# Patient Record
Sex: FEMALE | Race: WHITE | NOT HISPANIC OR LATINO | URBAN - METROPOLITAN AREA
[De-identification: names, ages, dates, MRNs, and addresses within clinical notes are randomized per-mention and may not be internally consistent; named-entity substitution may affect disease eponyms.]

---

## 2017-03-03 ENCOUNTER — HOSPITAL ENCOUNTER (OUTPATIENT)
Dept: URGENT CARE | Facility: CLINIC | Age: 52
Setting detail: SPECIMEN
Discharge: HOME | End: 2017-03-03
Attending: FAMILY MEDICINE | Admitting: FAMILY MEDICINE

## 2017-03-03 ENCOUNTER — HOSPITAL ENCOUNTER (OUTPATIENT)
Dept: LAB | Facility: HOSPITAL | Age: 52
Discharge: HOME | End: 2017-03-03
Attending: FAMILY MEDICINE | Admitting: FAMILY MEDICINE

## 2017-03-03 LAB
ANION GAP SERPL CALC-SCNC: 17 MMOL/L (ref 10–20)
APTT BLD: 26.3 SEC (ref 24–31)
BACTERIA SPEC AEROBE CULT: NORMAL
BASOPHILS # BLD AUTO: 0 10*3/UL (ref 0–0.2)
BASOPHILS NFR BLD AUTO: 0 % (ref 0–2)
BILIRUB UR QL STRIP: NEGATIVE MG/DL
BUN SERPL-MCNC: 9 MG/DL (ref 8–20)
BUN/CREAT SERPL: 12.9 (ref 5.4–26.2)
C TRACH RRNA SPEC QL PROBE: NORMAL
CALCIUM SERPL-MCNC: 8.8 MG/DL (ref 8.9–10.3)
CASTS URNS QL MICRO: ABNORMAL /[LPF]
CHLORIDE SERPL-SCNC: 104 MMOL/L (ref 101–111)
COLOR UR: ABNORMAL
CONV BACTERIA IN URINE MICRO: NEGATIVE
CONV CLARITY OF URINE: ABNORMAL
CONV CO2: 24 MMOL/L (ref 22–32)
CONV HYALINE CASTS IN URINE MICRO: 0 /[LPF] (ref 0–5)
CONV PROTEIN IN URINE BY AUTOMATED TEST STRIP: 30 MG/DL
CONV SMALL ROUND CELLS: ABNORMAL /[HPF]
CONV UROBILINOGEN IN URINE BY AUTOMATED TEST STRIP: 0.2 MG/DL
CREAT UR-MCNC: 0.7 MG/DL (ref 0.4–1)
CULTURE INDICATED?: ABNORMAL
DIFFERENTIAL METHOD BLD: (no result)
EOSINOPHIL # BLD AUTO: 0.1 10*3/UL (ref 0–0.3)
EOSINOPHIL # BLD AUTO: 1 % (ref 0–3)
ERYTHROCYTE [DISTWIDTH] IN BLOOD BY AUTOMATED COUNT: 20.5 % (ref 11.5–14.5)
GLUCOSE SERPL-MCNC: 89 MG/DL (ref 65–99)
GLUCOSE UR QL: ABNORMAL
GLUCOSE UR QL: ABNORMAL MG/DL
HCT VFR BLD AUTO: 29.6 % (ref 35–49)
HGB BLD-MCNC: 9.5 G/DL (ref 12–15)
HGB UR QL STRIP: ABNORMAL
INR PPP: 0.7
KETONES UR QL STRIP: NEGATIVE MG/DL
LEUKOCYTE ESTERASE UR QL STRIP: ABNORMAL
LYMPHOCYTES # BLD AUTO: 2.9 10*3/UL (ref 0.8–4.8)
LYMPHOCYTES NFR BLD AUTO: 29 % (ref 18–42)
Lab: NORMAL
MCH RBC QN AUTO: 22.4 PG (ref 26–32)
MCHC RBC AUTO-ENTMCNC: 32.1 G/DL (ref 32–36)
MCV RBC AUTO: 69.7 FL (ref 80–94)
MICRO REPORT STATUS: NORMAL
MONOCYTES # BLD AUTO: 0.6 10*3/UL (ref 0.1–1.3)
MONOCYTES NFR BLD AUTO: 6 % (ref 2–11)
N GONORRHOEA RRNA SPEC QL PROBE: NORMAL
NEUTROPHILS # BLD AUTO: 6.6 10*3/UL (ref 2.3–8.6)
NEUTROPHILS NFR BLD AUTO: 64 % (ref 50–75)
NITRITE UR QL STRIP: NEGATIVE
NRBC BLD AUTO-RTO: 0 /100{WBCS}
NRBC/RBC NFR BLD MANUAL: 0 10*3/UL
PH UR STRIP.AUTO: 5.5 [PH] (ref 4.5–8)
PLATELET # BLD AUTO: 289 10*3/UL (ref 150–450)
PMV BLD AUTO: 8.9 FL (ref 7.4–10.4)
POTASSIUM SERPL-SCNC: 4 MMOL/L (ref 3.6–5.1)
PROTHROMBIN TIME: 10.3 SEC (ref 9.6–11.7)
RBC # BLD AUTO: 4.25 10*6/UL (ref 4–5.4)
RBC #/AREA URNS HPF: ABNORMAL /[HPF] (ref 0–3)
SODIUM SERPL-SCNC: 141 MMOL/L (ref 136–144)
SP GR UR: 1.01 (ref 1–1.03)
SPECIMEN SOURCE: NORMAL
SPECIMEN SOURCE: NORMAL
SPERM URNS QL MICRO: ABNORMAL /[HPF]
SQUAMOUS SPT QL MICRO: 0 /[HPF] (ref 0–5)
UNIDENT CRYS URNS QL MICRO: ABNORMAL /[HPF]
WBC # BLD AUTO: 10.3 10*3/UL (ref 4.5–11.5)
WBC #/AREA URNS HPF: 3 /[HPF] (ref 0–5)
YEAST SPEC QL WET PREP: ABNORMAL /[HPF]

## 2017-03-16 ENCOUNTER — HOSPITAL ENCOUNTER (OUTPATIENT)
Dept: OTHER | Facility: HOSPITAL | Age: 52
Setting detail: SPECIMEN
Discharge: HOME | End: 2017-03-16
Attending: FAMILY MEDICINE | Admitting: FAMILY MEDICINE

## 2017-06-15 ENCOUNTER — HOSPITAL ENCOUNTER (OUTPATIENT)
Dept: OTHER | Facility: HOSPITAL | Age: 52
Setting detail: SPECIMEN
Discharge: HOME | End: 2017-06-15
Attending: FAMILY MEDICINE | Admitting: FAMILY MEDICINE

## 2017-06-15 LAB
CHOLEST SERPL-MCNC: 269 MG/DL
CHOLEST/HDLC SERPL: 4.9 {RATIO}
CONV LDL CHOLESTEROL DIRECT: 186 MG/DL (ref 0–100)
HDLC SERPL-MCNC: 55 MG/DL
LDLC/HDLC SERPL: 3.4 {RATIO}
LIPID INTERPRETATION: ABNORMAL
TRIGL SERPL-MCNC: 238 MG/DL
VLDLC SERPL CALC-MCNC: 28.6 MG/DL

## 2017-06-16 ENCOUNTER — HOSPITAL ENCOUNTER (OUTPATIENT)
Dept: OTHER | Facility: HOSPITAL | Age: 52
Setting detail: SPECIMEN
Discharge: HOME | End: 2017-06-16
Attending: FAMILY MEDICINE | Admitting: FAMILY MEDICINE

## 2017-06-16 LAB
ALBUMIN SERPL-MCNC: 3.8 G/DL (ref 3.5–4.8)
ALBUMIN/GLOB SERPL: 1.1 {RATIO} (ref 1–1.7)
ALP SERPL-CCNC: 80 IU/L (ref 32–91)
ALT SERPL-CCNC: 17 IU/L (ref 14–54)
ANION GAP SERPL CALC-SCNC: 15.8 MMOL/L (ref 10–20)
AST SERPL-CCNC: 16 IU/L (ref 15–41)
BASOPHILS # BLD AUTO: 0.1 10*3/UL (ref 0–0.2)
BASOPHILS NFR BLD AUTO: 1 % (ref 0–2)
BILIRUB SERPL-MCNC: 0.3 MG/DL (ref 0.3–1.2)
BUN SERPL-MCNC: 12 MG/DL (ref 8–20)
BUN/CREAT SERPL: 20 (ref 5.4–26.2)
CALCIUM SERPL-MCNC: 9.4 MG/DL (ref 8.9–10.3)
CHLORIDE SERPL-SCNC: 103 MMOL/L (ref 101–111)
CONV CO2: 25 MMOL/L (ref 22–32)
CONV TOTAL PROTEIN: 7.3 G/DL (ref 6.1–7.9)
CREAT UR-MCNC: 0.6 MG/DL (ref 0.4–1)
DIFFERENTIAL METHOD BLD: (no result)
EOSINOPHIL # BLD AUTO: 0.2 10*3/UL (ref 0–0.3)
EOSINOPHIL # BLD AUTO: 3 % (ref 0–3)
ERYTHROCYTE [DISTWIDTH] IN BLOOD BY AUTOMATED COUNT: 20 % (ref 11.5–14.5)
GLOBULIN UR ELPH-MCNC: 3.5 G/DL (ref 2.5–3.8)
GLUCOSE SERPL-MCNC: 82 MG/DL (ref 65–99)
HCT VFR BLD AUTO: 37.7 % (ref 35–49)
HGB BLD-MCNC: 11.5 G/DL (ref 12–15)
LYMPHOCYTES # BLD AUTO: 3.3 10*3/UL (ref 0.8–4.8)
LYMPHOCYTES NFR BLD AUTO: 38 % (ref 18–42)
MCH RBC QN AUTO: 20.9 PG (ref 26–32)
MCHC RBC AUTO-ENTMCNC: 30.6 G/DL (ref 32–36)
MCV RBC AUTO: 68.4 FL (ref 80–94)
MONOCYTES # BLD AUTO: 0.5 10*3/UL (ref 0.1–1.3)
MONOCYTES NFR BLD AUTO: 6 % (ref 2–11)
NEUTROPHILS # BLD AUTO: 4.6 10*3/UL (ref 2.3–8.6)
NEUTROPHILS NFR BLD AUTO: 52 % (ref 50–75)
NRBC BLD AUTO-RTO: 0 /100{WBCS}
NRBC/RBC NFR BLD MANUAL: 0 10*3/UL
PLATELET # BLD AUTO: 252 10*3/UL (ref 150–450)
PMV BLD AUTO: 9.8 FL (ref 7.4–10.4)
POTASSIUM SERPL-SCNC: 3.8 MMOL/L (ref 3.6–5.1)
RBC # BLD AUTO: 5.51 10*6/UL (ref 4–5.4)
SODIUM SERPL-SCNC: 140 MMOL/L (ref 136–144)
WBC # BLD AUTO: 8.7 10*3/UL (ref 4.5–11.5)

## 2017-12-12 ENCOUNTER — HOSPITAL ENCOUNTER (OUTPATIENT)
Dept: OTHER | Facility: HOSPITAL | Age: 52
Setting detail: SPECIMEN
Discharge: HOME | End: 2017-12-12
Attending: FAMILY MEDICINE | Admitting: FAMILY MEDICINE

## 2017-12-12 LAB
ALBUMIN SERPL-MCNC: 4 G/DL (ref 3.5–4.8)
ALBUMIN/GLOB SERPL: 1.1 {RATIO} (ref 1–1.7)
ALP SERPL-CCNC: 78 IU/L (ref 32–91)
ALT SERPL-CCNC: 21 IU/L (ref 14–54)
ANION GAP SERPL CALC-SCNC: 13.2 MMOL/L (ref 10–20)
AST SERPL-CCNC: 15 IU/L (ref 15–41)
BASOPHILS # BLD AUTO: 0.1 10*3/UL (ref 0–0.2)
BASOPHILS NFR BLD AUTO: 1 % (ref 0–2)
BILIRUB SERPL-MCNC: 0.3 MG/DL (ref 0.3–1.2)
BUN SERPL-MCNC: 10 MG/DL (ref 8–20)
BUN/CREAT SERPL: 14.3 (ref 5.4–26.2)
CALCIUM SERPL-MCNC: 9.4 MG/DL (ref 8.9–10.3)
CHLORIDE SERPL-SCNC: 102 MMOL/L (ref 101–111)
CHOLEST SERPL-MCNC: 278 MG/DL
CHOLEST/HDLC SERPL: 4.9 {RATIO}
CONV CO2: 26 MMOL/L (ref 22–32)
CONV LDL CHOLESTEROL DIRECT: 200 MG/DL (ref 0–100)
CONV TOTAL PROTEIN: 7.6 G/DL (ref 6.1–7.9)
CREAT UR-MCNC: 0.7 MG/DL (ref 0.4–1)
DIFFERENTIAL METHOD BLD: (no result)
EOSINOPHIL # BLD AUTO: 0.1 10*3/UL (ref 0–0.3)
EOSINOPHIL # BLD AUTO: 2 % (ref 0–3)
ERYTHROCYTE [DISTWIDTH] IN BLOOD BY AUTOMATED COUNT: 14.9 % (ref 11.5–14.5)
GLOBULIN UR ELPH-MCNC: 3.6 G/DL (ref 2.5–3.8)
GLUCOSE SERPL-MCNC: 94 MG/DL (ref 65–99)
HCT VFR BLD AUTO: 38.7 % (ref 35–49)
HDLC SERPL-MCNC: 57 MG/DL
HGB BLD-MCNC: 12.7 G/DL (ref 12–15)
LDLC/HDLC SERPL: 3.5 {RATIO}
LIPID INTERPRETATION: ABNORMAL
LYMPHOCYTES # BLD AUTO: 2.9 10*3/UL (ref 0.8–4.8)
LYMPHOCYTES NFR BLD AUTO: 38 % (ref 18–42)
MCH RBC QN AUTO: 25.5 PG (ref 26–32)
MCHC RBC AUTO-ENTMCNC: 32.7 G/DL (ref 32–36)
MCV RBC AUTO: 78 FL (ref 80–94)
MONOCYTES # BLD AUTO: 0.5 10*3/UL (ref 0.1–1.3)
MONOCYTES NFR BLD AUTO: 6 % (ref 2–11)
NEUTROPHILS # BLD AUTO: 4.1 10*3/UL (ref 2.3–8.6)
NEUTROPHILS NFR BLD AUTO: 53 % (ref 50–75)
NRBC BLD AUTO-RTO: 0 /100{WBCS}
NRBC/RBC NFR BLD MANUAL: 0 10*3/UL
PLATELET # BLD AUTO: 256 10*3/UL (ref 150–450)
PMV BLD AUTO: 8.5 FL (ref 7.4–10.4)
POTASSIUM SERPL-SCNC: 4.2 MMOL/L (ref 3.6–5.1)
RBC # BLD AUTO: 4.97 10*6/UL (ref 4–5.4)
SODIUM SERPL-SCNC: 137 MMOL/L (ref 136–144)
T3 SERPL-MCNC: 1.23 NG/ML (ref 0.87–1.78)
T4 SERPL-MCNC: 7.57 UG/DL (ref 6.1–12.2)
TRIGL SERPL-MCNC: 219 MG/DL
TSH SERPL-ACNC: 1.59 UIU/ML (ref 0.34–5.6)
VLDLC SERPL CALC-MCNC: 20.9 MG/DL
WBC # BLD AUTO: 7.7 10*3/UL (ref 4.5–11.5)

## 2018-06-11 ENCOUNTER — HOSPITAL ENCOUNTER (OUTPATIENT)
Dept: OTHER | Facility: HOSPITAL | Age: 53
Setting detail: SPECIMEN
Discharge: HOME | End: 2018-06-11
Attending: FAMILY MEDICINE | Admitting: FAMILY MEDICINE

## 2018-07-27 ENCOUNTER — HOSPITAL ENCOUNTER (OUTPATIENT)
Dept: ULTRASOUND IMAGING | Facility: HOSPITAL | Age: 53
Discharge: HOME | End: 2018-07-27
Attending: FAMILY MEDICINE | Admitting: FAMILY MEDICINE

## 2018-12-13 ENCOUNTER — HOSPITAL ENCOUNTER (OUTPATIENT)
Dept: OTHER | Facility: HOSPITAL | Age: 53
Discharge: HOME | End: 2018-12-13
Attending: FAMILY MEDICINE | Admitting: FAMILY MEDICINE

## 2018-12-13 ENCOUNTER — HOSPITAL ENCOUNTER (OUTPATIENT)
Dept: OTHER | Facility: HOSPITAL | Age: 53
Setting detail: SPECIMEN
Discharge: HOME | End: 2018-12-13
Attending: FAMILY MEDICINE | Admitting: FAMILY MEDICINE

## 2018-12-13 LAB
ALBUMIN SERPL-MCNC: 4 G/DL (ref 3.5–4.8)
ALBUMIN/GLOB SERPL: 1.1 {RATIO} (ref 1–1.7)
ALP SERPL-CCNC: 79 IU/L (ref 32–91)
ALT SERPL-CCNC: 22 IU/L (ref 14–54)
ANION GAP SERPL CALC-SCNC: 13.1 MMOL/L (ref 10–20)
AST SERPL-CCNC: 20 IU/L (ref 15–41)
BASOPHILS # BLD AUTO: 0.1 10*3/UL (ref 0–0.2)
BASOPHILS NFR BLD AUTO: 1 % (ref 0–2)
BILIRUB SERPL-MCNC: 0.9 MG/DL (ref 0.3–1.2)
BUN SERPL-MCNC: 14 MG/DL (ref 8–20)
BUN/CREAT SERPL: 20 (ref 5.4–26.2)
CALCIUM SERPL-MCNC: 9.2 MG/DL (ref 8.9–10.3)
CHLORIDE SERPL-SCNC: 98 MMOL/L (ref 101–111)
CHOLEST SERPL-MCNC: 292 MG/DL
CHOLEST/HDLC SERPL: 4.4 {RATIO}
CONV CO2: 27 MMOL/L (ref 22–32)
CONV LDL CHOLESTEROL DIRECT: 222 MG/DL (ref 0–100)
CONV TOTAL PROTEIN: 7.5 G/DL (ref 6.1–7.9)
CREAT UR-MCNC: 0.7 MG/DL (ref 0.4–1)
DIFFERENTIAL METHOD BLD: (no result)
EOSINOPHIL # BLD AUTO: 0.1 10*3/UL (ref 0–0.3)
EOSINOPHIL # BLD AUTO: 2 % (ref 0–3)
ERYTHROCYTE [DISTWIDTH] IN BLOOD BY AUTOMATED COUNT: 14.2 % (ref 11.5–14.5)
GLOBULIN UR ELPH-MCNC: 3.5 G/DL (ref 2.5–3.8)
GLUCOSE SERPL-MCNC: 100 MG/DL (ref 65–99)
HCT VFR BLD AUTO: 41.4 % (ref 35–49)
HDLC SERPL-MCNC: 66 MG/DL
HGB BLD-MCNC: 13.9 G/DL (ref 12–15)
LDLC/HDLC SERPL: 3.4 {RATIO}
LIPID INTERPRETATION: ABNORMAL
LYMPHOCYTES # BLD AUTO: 2.6 10*3/UL (ref 0.8–4.8)
LYMPHOCYTES NFR BLD AUTO: 38 % (ref 18–42)
MCH RBC QN AUTO: 27 PG (ref 26–32)
MCHC RBC AUTO-ENTMCNC: 33.5 G/DL (ref 32–36)
MCV RBC AUTO: 80.5 FL (ref 80–94)
MONOCYTES # BLD AUTO: 0.4 10*3/UL (ref 0.1–1.3)
MONOCYTES NFR BLD AUTO: 6 % (ref 2–11)
NEUTROPHILS # BLD AUTO: 3.6 10*3/UL (ref 2.3–8.6)
NEUTROPHILS NFR BLD AUTO: 53 % (ref 50–75)
NRBC BLD AUTO-RTO: 0 /100{WBCS}
NRBC/RBC NFR BLD MANUAL: 0 10*3/UL
PLATELET # BLD AUTO: 233 10*3/UL (ref 150–450)
PMV BLD AUTO: 8.7 FL (ref 7.4–10.4)
POTASSIUM SERPL-SCNC: 4.1 MMOL/L (ref 3.6–5.1)
RBC # BLD AUTO: 5.14 10*6/UL (ref 4–5.4)
SODIUM SERPL-SCNC: 134 MMOL/L (ref 136–144)
TRIGL SERPL-MCNC: 217 MG/DL
TSH SERPL-ACNC: 0.98 UIU/ML (ref 0.34–5.6)
VLDLC SERPL CALC-MCNC: 4.2 MG/DL
WBC # BLD AUTO: 6.8 10*3/UL (ref 4.5–11.5)

## 2018-12-14 LAB — HBA1C MFR BLD: 5.8 % (ref 0–5.6)

## 2019-06-24 ENCOUNTER — OFFICE VISIT (OUTPATIENT)
Dept: FAMILY MEDICINE CLINIC | Facility: CLINIC | Age: 54
End: 2019-06-24

## 2019-06-24 VITALS
BODY MASS INDEX: 40.29 KG/M2 | DIASTOLIC BLOOD PRESSURE: 81 MMHG | HEIGHT: 64 IN | OXYGEN SATURATION: 99 % | WEIGHT: 236 LBS | SYSTOLIC BLOOD PRESSURE: 128 MMHG | HEART RATE: 68 BPM

## 2019-06-24 DIAGNOSIS — E66.9 OBESITY WITHOUT SERIOUS COMORBIDITY, UNSPECIFIED CLASSIFICATION, UNSPECIFIED OBESITY TYPE: ICD-10-CM

## 2019-06-24 DIAGNOSIS — Z00.00 ENCOUNTER FOR PREVENTIVE HEALTH EXAMINATION: ICD-10-CM

## 2019-06-24 DIAGNOSIS — F32.A ANXIETY AND DEPRESSION: Primary | ICD-10-CM

## 2019-06-24 DIAGNOSIS — N95.9 PERIMENOPAUSAL DISORDER: ICD-10-CM

## 2019-06-24 DIAGNOSIS — F41.9 ANXIETY AND DEPRESSION: Primary | ICD-10-CM

## 2019-06-24 DIAGNOSIS — Z01.419 WELL FEMALE EXAM WITH ROUTINE GYNECOLOGICAL EXAM: ICD-10-CM

## 2019-06-24 PROBLEM — R73.09 ABNORMAL GLUCOSE LEVEL: Status: ACTIVE | Noted: 2017-12-12

## 2019-06-24 PROBLEM — N92.0 MENORRHAGIA: Status: ACTIVE | Noted: 2017-03-03

## 2019-06-24 PROBLEM — Z83.3 FAMILY HISTORY OF DIABETES MELLITUS: Status: ACTIVE | Noted: 2017-06-15

## 2019-06-24 PROBLEM — E78.2 MULTIPLE-TYPE HYPERLIPIDEMIA: Status: ACTIVE | Noted: 2017-12-12

## 2019-06-24 PROBLEM — R92.8 OTHER ABNORMAL AND INCONCLUSIVE FINDINGS ON DIAGNOSTIC IMAGING OF BREAST: Status: ACTIVE | Noted: 2018-06-27

## 2019-06-24 PROBLEM — Z86.2 HISTORY OF ANEMIA: Status: ACTIVE | Noted: 2017-06-15

## 2019-06-24 PROCEDURE — 99396 PREV VISIT EST AGE 40-64: CPT | Performed by: FAMILY MEDICINE

## 2019-06-24 RX ORDER — ATORVASTATIN CALCIUM 20 MG/1
TABLET, FILM COATED ORAL DAILY
Refills: 0 | COMMUNITY
Start: 2019-06-05 | End: 2019-07-22

## 2019-06-24 RX ORDER — PAROXETINE 10 MG/1
TABLET, FILM COATED ORAL DAILY
Refills: 1 | COMMUNITY
Start: 2019-06-05 | End: 2019-07-22 | Stop reason: SDUPTHER

## 2019-06-24 RX ORDER — LISINOPRIL 20 MG/1
TABLET ORAL DAILY
Refills: 1 | COMMUNITY
Start: 2019-06-10 | End: 2019-07-22

## 2019-06-24 NOTE — PATIENT INSTRUCTIONS
Pap sent.  Future Labs as noted. F/U as indicated. Meds reviewed w adjust prn.  Cont HBP/HLD/DM Diet and Exercise w improvement in both.  Annual Mammogram w F/U prn as indicated.

## 2019-06-24 NOTE — PROGRESS NOTES
"Subjective   Gena Harper is a 53 y.o. female.     Pt in for Annual Wellness/Preventive Exam w GYN and Ll[facility].  Also F/U HBP/HLD/DM.  Doing well overall x cont struggle w Obesity. Very light menses in 01/19. No Abnormal Paps.     /81 (BP Location: Left arm, Patient Position: Sitting, Cuff Size: Large Adult)   Pulse 68   Ht 163.8 cm (64.49\")   Wt 107 kg (236 lb)   SpO2 99%   Breastfeeding? No   BMI 39.90 kg/m²     The following portions of the patient's history were reviewed and updated as appropriate: allergies, current medications, past family history, past medical history, past social history, past surgical history and problem list.    Review of Systems   Constitutional:        Chronic Overweight.   HENT: Negative.    Eyes: Negative.    Respiratory: Negative.    Cardiovascular: Negative.         HBP/HLD.   Gastrointestinal: Negative.    Endocrine: Negative.    Genitourinary: Positive for vaginal bleeding. Negative for dyspareunia, dysuria, pelvic pain, vaginal discharge, vaginal pain, breast lump and breast pain.        Perimenopausal.  Brief Flow 01/19.   Musculoskeletal: Negative.    Allergic/Immunologic: Negative.    Neurological: Negative.    Hematological: Negative.    Psychiatric/Behavioral: Positive for depressed mood and stress. The patient is nervous/anxious.         Improved w Citalopram.       Objective   Physical Exam   Constitutional: She is oriented to person, place, and time. She appears well-developed and well-nourished.   Mod Obese WF.  NAD.   HENT:   Head: Normocephalic and atraumatic.   Nose: Nose normal.   Mouth/Throat: Oropharynx is clear and moist.   Eyes: Conjunctivae and EOM are normal. Pupils are equal, round, and reactive to light.   Neck: Normal range of motion. Neck supple. No thyromegaly present.   Cardiovascular: Normal rate, regular rhythm, normal heart sounds and intact distal pulses. Exam reveals no gallop and no friction rub.   No murmur heard.  Pulmonary/Chest: " Effort normal and breath sounds normal. She has no wheezes. She has no rales. She exhibits no tenderness.   Abdominal: Soft. Bowel sounds are normal. She exhibits no distension and no mass. There is no tenderness. No hernia. Hernia confirmed negative in the right inguinal area and confirmed negative in the left inguinal area.   Genitourinary: Vagina normal and uterus normal. Pelvic exam was performed with patient supine. There is no rash, tenderness, lesion, injury or Bartholin's cyst on the right labia. There is no rash, tenderness, lesion or injury on the left labia. No vaginal discharge found.   Genitourinary Comments: Breasts symmetrical w no masses, adenopathy, or nipp;e discharge. Ovaries Palp NL. No tenderness.   Musculoskeletal: Normal range of motion. She exhibits no edema or deformity.   Lymphadenopathy:     She has no cervical adenopathy.        Right: No inguinal adenopathy present.        Left: No inguinal adenopathy present.   Neurological: She is alert and oriented to person, place, and time. No cranial nerve deficit or sensory deficit. She exhibits normal muscle tone.   Skin: Skin is warm and dry. Capillary refill takes 2 to 3 seconds. No rash noted. No erythema.   Psychiatric: She has a normal mood and affect. Her behavior is normal. Judgment and thought content normal.   Vitals reviewed.        Assessment/Plan   Gena was seen today for annual exam and numbness.    Diagnoses and all orders for this visit:    Anxiety and depression    Encounter for preventive health examination    Well female exam with routine gynecological exam  -     Liquid-based Pap Smear, Screening    Obesity without serious comorbidity, unspecified classification, unspecified obesity type    Perimenopausal disorder

## 2019-06-30 LAB
CONV .: NORMAL
CYTOLOGIST CVX/VAG CYTO: NORMAL
CYTOLOGY CVX/VAG DOC CYTO: NORMAL
DX ICD CODE: NORMAL
HIV 1 & 2 AB SER-IMP: NORMAL
OTHER STN SPEC: NORMAL
STAT OF ADQ CVX/VAG CYTO-IMP: NORMAL

## 2019-07-05 ENCOUNTER — LAB (OUTPATIENT)
Dept: FAMILY MEDICINE CLINIC | Facility: CLINIC | Age: 54
End: 2019-07-05

## 2019-07-05 DIAGNOSIS — E66.9 OBESITY WITHOUT SERIOUS COMORBIDITY, UNSPECIFIED CLASSIFICATION, UNSPECIFIED OBESITY TYPE: ICD-10-CM

## 2019-07-05 DIAGNOSIS — Z00.00 ENCOUNTER FOR PREVENTIVE HEALTH EXAMINATION: ICD-10-CM

## 2019-07-05 DIAGNOSIS — Z01.419 WELL FEMALE EXAM WITH ROUTINE GYNECOLOGICAL EXAM: ICD-10-CM

## 2019-07-05 DIAGNOSIS — N95.9 PERIMENOPAUSAL DISORDER: ICD-10-CM

## 2019-07-05 DIAGNOSIS — F32.A ANXIETY AND DEPRESSION: ICD-10-CM

## 2019-07-05 DIAGNOSIS — F41.9 ANXIETY AND DEPRESSION: ICD-10-CM

## 2019-07-05 LAB
ALBUMIN SERPL-MCNC: 3.9 G/DL (ref 3.5–4.8)
ALBUMIN/GLOB SERPL: 1.1 G/DL (ref 1–1.7)
ALP SERPL-CCNC: 83 U/L (ref 32–91)
ALT SERPL W P-5'-P-CCNC: 21 U/L (ref 14–54)
ANION GAP SERPL CALCULATED.3IONS-SCNC: 15 MMOL/L (ref 10–20)
ARTICHOKE IGE QN: 142 MG/DL (ref 0–100)
AST SERPL-CCNC: 20 U/L (ref 15–41)
BASOPHILS # BLD AUTO: 0 10*3/MM3 (ref 0–0.2)
BASOPHILS NFR BLD AUTO: 0.6 % (ref 0–1.5)
BILIRUB SERPL-MCNC: 0.7 MG/DL (ref 0.3–1.2)
BUN BLD-MCNC: 12 MG/DL (ref 8–20)
BUN/CREAT SERPL: 17.1 (ref 5.4–26.2)
CALCIUM SPEC-SCNC: 9.1 MG/DL (ref 8.9–10.3)
CHLORIDE SERPL-SCNC: 102 MMOL/L (ref 101–111)
CHOLEST SERPL-MCNC: 208 MG/DL
CO2 SERPL-SCNC: 24 MMOL/L (ref 22–32)
CREAT BLD-MCNC: 0.7 MG/DL (ref 0.4–1)
DEPRECATED RDW RBC AUTO: 41.6 FL (ref 37–54)
EOSINOPHIL # BLD AUTO: 0.1 10*3/MM3 (ref 0–0.4)
EOSINOPHIL NFR BLD AUTO: 1.9 % (ref 0.3–6.2)
ERYTHROCYTE [DISTWIDTH] IN BLOOD BY AUTOMATED COUNT: 14.4 % (ref 12.3–15.4)
GFR SERPL CREATININE-BSD FRML MDRD: 88 ML/MIN/1.73
GLOBULIN UR ELPH-MCNC: 3.6 GM/DL (ref 2.5–3.8)
GLUCOSE BLD-MCNC: 106 MG/DL (ref 65–99)
HCT VFR BLD AUTO: 40.7 % (ref 34–46.6)
HDLC SERPL QL: 3.35
HDLC SERPL-MCNC: 62 MG/DL
HGB BLD-MCNC: 13.6 G/DL (ref 12–15.9)
LDLC/HDLC SERPL: 1.56 {RATIO}
LYMPHOCYTES # BLD AUTO: 2.9 10*3/MM3 (ref 0.7–3.1)
LYMPHOCYTES NFR BLD AUTO: 38.3 % (ref 19.6–45.3)
MCH RBC QN AUTO: 27.5 PG (ref 26.6–33)
MCHC RBC AUTO-ENTMCNC: 33.5 G/DL (ref 31.5–35.7)
MCV RBC AUTO: 82.3 FL (ref 79–97)
MONOCYTES # BLD AUTO: 0.6 10*3/MM3 (ref 0.1–0.9)
MONOCYTES NFR BLD AUTO: 7.4 % (ref 5–12)
NEUTROPHILS # BLD AUTO: 3.9 10*3/MM3 (ref 1.7–7)
NEUTROPHILS NFR BLD AUTO: 51.8 % (ref 42.7–76)
NRBC BLD AUTO-RTO: 0.2 /100 WBC (ref 0–0.2)
PLATELET # BLD AUTO: 232 10*3/MM3 (ref 140–450)
PMV BLD AUTO: 9.5 FL (ref 6–12)
POTASSIUM BLD-SCNC: 4 MMOL/L (ref 3.6–5.1)
PROT SERPL-MCNC: 7.5 G/DL (ref 6.1–7.9)
RBC # BLD AUTO: 4.94 10*6/MM3 (ref 3.77–5.28)
SODIUM BLD-SCNC: 137 MMOL/L (ref 136–144)
T4 SERPL-MCNC: 8.25 MCG/DL (ref 6.1–12.2)
TRIGL SERPL-MCNC: 245 MG/DL
TSH SERPL DL<=0.05 MIU/L-ACNC: 0.95 MIU/ML (ref 0.34–5.6)
VLDLC SERPL-MCNC: 49 MG/DL
WBC NRBC COR # BLD: 7.5 10*3/MM3 (ref 3.4–10.8)

## 2019-07-05 PROCEDURE — 83036 HEMOGLOBIN GLYCOSYLATED A1C: CPT | Performed by: FAMILY MEDICINE

## 2019-07-05 PROCEDURE — 80061 LIPID PANEL: CPT | Performed by: FAMILY MEDICINE

## 2019-07-05 PROCEDURE — 84436 ASSAY OF TOTAL THYROXINE: CPT | Performed by: FAMILY MEDICINE

## 2019-07-05 PROCEDURE — 84443 ASSAY THYROID STIM HORMONE: CPT | Performed by: FAMILY MEDICINE

## 2019-07-05 PROCEDURE — 85025 COMPLETE CBC W/AUTO DIFF WBC: CPT | Performed by: FAMILY MEDICINE

## 2019-07-05 PROCEDURE — 36415 COLL VENOUS BLD VENIPUNCTURE: CPT | Performed by: FAMILY MEDICINE

## 2019-07-05 PROCEDURE — 80053 COMPREHEN METABOLIC PANEL: CPT | Performed by: FAMILY MEDICINE

## 2019-07-08 LAB — HBA1C MFR BLD: 6 % (ref 3.5–5.6)

## 2019-07-21 RX ORDER — ATORVASTATIN CALCIUM 20 MG/1
TABLET, FILM COATED ORAL
Qty: 30 TABLET | Refills: 0 | Status: CANCELLED | OUTPATIENT
Start: 2019-07-21

## 2019-07-22 RX ORDER — ATORVASTATIN CALCIUM 20 MG/1
20 TABLET, FILM COATED ORAL DAILY
Qty: 90 TABLET | Refills: 1 | Status: SHIPPED | OUTPATIENT
Start: 2019-07-22 | End: 2020-01-08 | Stop reason: SDUPTHER

## 2019-07-22 RX ORDER — PAROXETINE 10 MG/1
10 TABLET, FILM COATED ORAL DAILY
Qty: 90 TABLET | Refills: 1 | Status: SHIPPED | OUTPATIENT
Start: 2019-07-22 | End: 2020-01-08 | Stop reason: SDUPTHER

## 2019-07-22 RX ORDER — LISINOPRIL 20 MG/1
20 TABLET ORAL DAILY
Qty: 90 TABLET | Refills: 1 | Status: SHIPPED | OUTPATIENT
Start: 2019-07-22 | End: 2020-01-08 | Stop reason: SDUPTHER

## 2020-01-08 ENCOUNTER — OFFICE VISIT (OUTPATIENT)
Dept: FAMILY MEDICINE CLINIC | Facility: CLINIC | Age: 55
End: 2020-01-08

## 2020-01-08 VITALS
HEART RATE: 72 BPM | WEIGHT: 246 LBS | HEIGHT: 64 IN | SYSTOLIC BLOOD PRESSURE: 132 MMHG | OXYGEN SATURATION: 96 % | DIASTOLIC BLOOD PRESSURE: 83 MMHG | BODY MASS INDEX: 42 KG/M2

## 2020-01-08 DIAGNOSIS — R73.09 ABNORMAL GLUCOSE: ICD-10-CM

## 2020-01-08 DIAGNOSIS — I10 ESSENTIAL HYPERTENSION: Primary | ICD-10-CM

## 2020-01-08 DIAGNOSIS — E78.2 MIXED HYPERLIPIDEMIA: ICD-10-CM

## 2020-01-08 DIAGNOSIS — Z12.31 BREAST CANCER SCREENING BY MAMMOGRAM: ICD-10-CM

## 2020-01-08 PROCEDURE — 99214 OFFICE O/P EST MOD 30 MIN: CPT | Performed by: NURSE PRACTITIONER

## 2020-01-08 PROCEDURE — 83036 HEMOGLOBIN GLYCOSYLATED A1C: CPT | Performed by: NURSE PRACTITIONER

## 2020-01-08 PROCEDURE — 85027 COMPLETE CBC AUTOMATED: CPT | Performed by: NURSE PRACTITIONER

## 2020-01-08 PROCEDURE — 84443 ASSAY THYROID STIM HORMONE: CPT | Performed by: NURSE PRACTITIONER

## 2020-01-08 PROCEDURE — 36415 COLL VENOUS BLD VENIPUNCTURE: CPT | Performed by: NURSE PRACTITIONER

## 2020-01-08 PROCEDURE — 80053 COMPREHEN METABOLIC PANEL: CPT | Performed by: NURSE PRACTITIONER

## 2020-01-08 PROCEDURE — 80061 LIPID PANEL: CPT | Performed by: NURSE PRACTITIONER

## 2020-01-08 RX ORDER — PAROXETINE 10 MG/1
10 TABLET, FILM COATED ORAL DAILY
Qty: 90 TABLET | Refills: 1 | Status: SHIPPED | OUTPATIENT
Start: 2020-01-08 | End: 2020-07-08 | Stop reason: SDUPTHER

## 2020-01-08 RX ORDER — ATORVASTATIN CALCIUM 20 MG/1
20 TABLET, FILM COATED ORAL DAILY
Qty: 90 TABLET | Refills: 1 | Status: SHIPPED | OUTPATIENT
Start: 2020-01-08 | End: 2020-07-08 | Stop reason: SDUPTHER

## 2020-01-08 RX ORDER — LISINOPRIL 20 MG/1
20 TABLET ORAL DAILY
Qty: 90 TABLET | Refills: 1 | Status: SHIPPED | OUTPATIENT
Start: 2020-01-08 | End: 2020-07-08 | Stop reason: SDUPTHER

## 2020-01-08 NOTE — PROGRESS NOTES
"  Gena Harper is a 54 y.o. female.     Chief Complaint   Patient presents with   • Hyperlipidemia     6 month f/u.  Also needs mammogram order.       History of Present Illness     1. HTN/HLD, bp stable, c/o dull mild left sided chest discomfort happens quickly then goes away, not associated with activity, but sometimes after a meal. Denies palpitations and swelling. Has been taking lipitor 2 years, last lipids improved. Tolerating meds, monitors diet and exercises occasionally.   2. Near postmenopausal, [id number]  Went 1 full year w/o menses, then had period for 2 mo jan/feb 2019. No menses since.   3. Anxiety/depression, stable on paxil.     Subjective     Visit Vitals  /83 (BP Location: Left arm, Patient Position: Sitting, Cuff Size: Large Adult)   Pulse 72   Ht 163.8 cm (64.49\")   Wt 112 kg (246 lb)   SpO2 96%   BMI 41.59 kg/m²       The following portions of the patient's history were reviewed and updated as appropriate: allergies, current medications, past family history, past medical history, past social history, past surgical history and problem list.    Review of Systems   Constitutional: Negative for chills, fatigue and fever.   HENT: Negative for dental problem, ear pain, sinus pressure and sore throat.    Eyes: Negative for visual disturbance.   Respiratory: Negative for cough, shortness of breath and wheezing.    Cardiovascular: Negative for palpitations and leg swelling.   Gastrointestinal: Negative for abdominal pain, blood in stool, constipation, diarrhea, nausea, vomiting and GERD.   Genitourinary: Negative for difficulty urinating, frequency, urgency and urinary incontinence.   Musculoskeletal: Negative for arthralgias, back pain, gait problem, joint swelling, myalgias and neck pain.   Skin: Negative for dry skin, pallor and rash.   Neurological: Negative for dizziness, seizures, speech difficulty and weakness.   Hematological: Negative for adenopathy.   Psychiatric/Behavioral: Negative " for sleep disturbance, depressed mood and stress. The patient is not nervous/anxious.        Objective     Physical Exam   Constitutional: She is oriented to person, place, and time. She appears well-developed and well-nourished. No distress.   HENT:   Head: Normocephalic.   Eyes: Pupils are equal, round, and reactive to light. Conjunctivae are normal.   Neck: Normal range of motion. Neck supple. No JVD present. No thyromegaly present.   Cardiovascular: Normal rate, regular rhythm and normal heart sounds.   No murmur heard.  Pulmonary/Chest: Effort normal and breath sounds normal. No respiratory distress.   Abdominal: Soft. Bowel sounds are normal. She exhibits no distension. There is no tenderness.   Musculoskeletal: Normal range of motion. She exhibits no edema or tenderness.   Neurological: She is alert and oriented to person, place, and time. No sensory deficit.   Skin: Skin is warm and dry. No rash noted. She is not diaphoretic. No erythema.   Psychiatric: She has a normal mood and affect. Her behavior is normal. Judgment normal.   Nursing note and vitals reviewed.        Assessment/Plan   Gena was seen today for hyperlipidemia.    Diagnoses and all orders for this visit:    Essential hypertension  -     CBC (No Diff)  -     Lipid Panel  -     TSH  -     Comprehensive Metabolic Panel    bp stable, rf lisinopril. Check labs and notify results.     Mixed hyperlipidemia  Stable on lipitor, notify results of lipids when received.     Breast cancer screening by mammogram  -     Mammo Screening Digital Tomosynthesis Bilateral With CAD; Future  Ordered mammo    Abnormal glucose  -     Hemoglobin A1c  Check a1c, notify results.    Anxiety  Stable on paxil, rf     Depression       Other orders  -     PARoxetine (PAXIL) 10 MG tablet; Take 1 tablet by mouth Daily.  -     lisinopril (PRINIVIL,ZESTRIL) 20 MG tablet; Take 1 tablet by mouth Daily.  -     atorvastatin (LIPITOR) 20 MG tablet; Take 1 tablet by mouth  Daily.      Flu shot up to date.   Pap in 2 years, plan to order dexa/bone scan at next appt. Will be postmenopausal if no menses after February          Glucose   Date Value Ref Range Status   01/08/2020 101 (H) 65 - 99 mg/dL Final     BUN   Date Value Ref Range Status   01/08/2020 17 6 - 20 mg/dL Final     Creatinine   Date Value Ref Range Status   01/08/2020 0.74 0.57 - 1.00 mg/dL Final     Sodium   Date Value Ref Range Status   01/08/2020 139 136 - 145 mmol/L Final     Potassium   Date Value Ref Range Status   01/08/2020 4.4 3.5 - 5.2 mmol/L Final     Chloride   Date Value Ref Range Status   01/08/2020 99 98 - 107 mmol/L Final     CO2   Date Value Ref Range Status   01/08/2020 25.1 22.0 - 29.0 mmol/L Final     Calcium   Date Value Ref Range Status   01/08/2020 9.8 8.6 - 10.5 mg/dL Final     Total Protein   Date Value Ref Range Status   01/08/2020 8.0 6.0 - 8.5 g/dL Final     Albumin   Date Value Ref Range Status   01/08/2020 4.30 3.50 - 5.20 g/dL Final     ALT (SGPT)   Date Value Ref Range Status   01/08/2020 17 1 - 33 U/L Final     AST (SGOT)   Date Value Ref Range Status   01/08/2020 16 1 - 32 U/L Final     Alkaline Phosphatase   Date Value Ref Range Status   01/08/2020 98 39 - 117 U/L Final     Total Bilirubin   Date Value Ref Range Status   01/08/2020 0.2 0.2 - 1.2 mg/dL Final     eGFR Non  Amer   Date Value Ref Range Status   01/08/2020 82 >60 mL/min/1.73 Final     A/G Ratio   Date Value Ref Range Status   12/13/2018 1.1 1.0 - 1.7 Final     BUN/Creatinine Ratio   Date Value Ref Range Status   01/08/2020 23.0 7.0 - 25.0 Final     Anion Gap   Date Value Ref Range Status   01/08/2020 14.9 5.0 - 15.0 mmol/L Final

## 2020-01-09 LAB
ALBUMIN SERPL-MCNC: 4.3 G/DL (ref 3.5–5.2)
ALBUMIN/GLOB SERPL: 1.2 G/DL
ALP SERPL-CCNC: 98 U/L (ref 39–117)
ALT SERPL W P-5'-P-CCNC: 17 U/L (ref 1–33)
ANION GAP SERPL CALCULATED.3IONS-SCNC: 14.9 MMOL/L (ref 5–15)
AST SERPL-CCNC: 16 U/L (ref 1–32)
BILIRUB SERPL-MCNC: 0.2 MG/DL (ref 0.2–1.2)
BUN BLD-MCNC: 17 MG/DL (ref 6–20)
BUN/CREAT SERPL: 23 (ref 7–25)
CALCIUM SPEC-SCNC: 9.8 MG/DL (ref 8.6–10.5)
CHLORIDE SERPL-SCNC: 99 MMOL/L (ref 98–107)
CHOLEST SERPL-MCNC: 194 MG/DL (ref 0–200)
CO2 SERPL-SCNC: 25.1 MMOL/L (ref 22–29)
CREAT BLD-MCNC: 0.74 MG/DL (ref 0.57–1)
DEPRECATED RDW RBC AUTO: 42.7 FL (ref 37–54)
ERYTHROCYTE [DISTWIDTH] IN BLOOD BY AUTOMATED COUNT: 14.1 % (ref 12.3–15.4)
GFR SERPL CREATININE-BSD FRML MDRD: 82 ML/MIN/1.73
GLOBULIN UR ELPH-MCNC: 3.7 GM/DL
GLUCOSE BLD-MCNC: 101 MG/DL (ref 65–99)
HBA1C MFR BLD: 6 % (ref 3.5–5.6)
HCT VFR BLD AUTO: 41.3 % (ref 34–46.6)
HDLC SERPL-MCNC: 54 MG/DL (ref 40–60)
HGB BLD-MCNC: 13.4 G/DL (ref 12–15.9)
LDLC SERPL CALC-MCNC: 77 MG/DL (ref 0–100)
LDLC/HDLC SERPL: 1.42 {RATIO}
MCH RBC QN AUTO: 26.9 PG (ref 26.6–33)
MCHC RBC AUTO-ENTMCNC: 32.4 G/DL (ref 31.5–35.7)
MCV RBC AUTO: 82.9 FL (ref 79–97)
PLATELET # BLD AUTO: 244 10*3/MM3 (ref 140–450)
PMV BLD AUTO: 11.1 FL (ref 6–12)
POTASSIUM BLD-SCNC: 4.4 MMOL/L (ref 3.5–5.2)
PROT SERPL-MCNC: 8 G/DL (ref 6–8.5)
RBC # BLD AUTO: 4.98 10*6/MM3 (ref 3.77–5.28)
SODIUM BLD-SCNC: 139 MMOL/L (ref 136–145)
TRIGL SERPL-MCNC: 316 MG/DL (ref 0–150)
TSH SERPL DL<=0.05 MIU/L-ACNC: 1.24 UIU/ML (ref 0.27–4.2)
VLDLC SERPL-MCNC: 63.2 MG/DL (ref 5–40)
WBC NRBC COR # BLD: 9.83 10*3/MM3 (ref 3.4–10.8)

## 2020-01-15 ENCOUNTER — TELEPHONE (OUTPATIENT)
Dept: FAMILY MEDICINE CLINIC | Facility: CLINIC | Age: 55
End: 2020-01-15

## 2020-01-15 NOTE — TELEPHONE ENCOUNTER
Patient asked how to get her mammogram scheduled.  I gave her the phone number for [facility] scheduling.

## 2020-01-16 ENCOUNTER — HOSPITAL ENCOUNTER (OUTPATIENT)
Dept: MAMMOGRAPHY | Facility: HOSPITAL | Age: 55
Discharge: HOME | End: 2020-01-16
Admitting: NURSE PRACTITIONER

## 2020-01-16 DIAGNOSIS — Z12.31 BREAST CANCER SCREENING BY MAMMOGRAM: ICD-10-CM

## 2020-01-16 PROCEDURE — 77063 BREAST TOMOSYNTHESIS BI: CPT

## 2020-01-16 PROCEDURE — 77067 SCR MAMMO BI INCL CAD: CPT

## 2020-07-08 ENCOUNTER — OFFICE VISIT (OUTPATIENT)
Dept: FAMILY MEDICINE CLINIC | Facility: CLINIC | Age: 55
End: 2020-07-08

## 2020-07-08 VITALS
WEIGHT: 242.8 LBS | DIASTOLIC BLOOD PRESSURE: 77 MMHG | BODY MASS INDEX: 41.45 KG/M2 | SYSTOLIC BLOOD PRESSURE: 150 MMHG | TEMPERATURE: 98 F | HEART RATE: 65 BPM | HEIGHT: 64 IN | OXYGEN SATURATION: 98 %

## 2020-07-08 DIAGNOSIS — Z78.0 POSTMENOPAUSAL: ICD-10-CM

## 2020-07-08 DIAGNOSIS — E78.2 MIXED HYPERLIPIDEMIA: ICD-10-CM

## 2020-07-08 DIAGNOSIS — I10 ESSENTIAL HYPERTENSION: Primary | ICD-10-CM

## 2020-07-08 DIAGNOSIS — F32.A DEPRESSION, UNSPECIFIED DEPRESSION TYPE: ICD-10-CM

## 2020-07-08 DIAGNOSIS — Z12.11 COLON CANCER SCREENING: ICD-10-CM

## 2020-07-08 DIAGNOSIS — F41.9 ANXIETY: ICD-10-CM

## 2020-07-08 PROCEDURE — 99214 OFFICE O/P EST MOD 30 MIN: CPT | Performed by: NURSE PRACTITIONER

## 2020-07-08 RX ORDER — LISINOPRIL 20 MG/1
20 TABLET ORAL DAILY
Qty: 90 TABLET | Refills: 1 | Status: SHIPPED | OUTPATIENT
Start: 2020-07-08 | End: 2020-12-23

## 2020-07-08 RX ORDER — ATORVASTATIN CALCIUM 20 MG/1
20 TABLET, FILM COATED ORAL DAILY
Qty: 90 TABLET | Refills: 1 | Status: SHIPPED | OUTPATIENT
Start: 2020-07-08 | End: 2020-12-23

## 2020-07-08 RX ORDER — PAROXETINE 10 MG/1
10 TABLET, FILM COATED ORAL DAILY
Qty: 90 TABLET | Refills: 1 | Status: SHIPPED | OUTPATIENT
Start: 2020-07-08 | End: 2020-12-23

## 2020-07-08 NOTE — PROGRESS NOTES
Chief Complaint   Patient presents with   • Hypertension   • Med Refill   • Follow-up       HPI     HTN, stable on meds and takes as directed, denies chest pain, headache, shortness of air, palpitations and swelling of extremities.     Hyperlipidemia, The patient denies muscle aches, constipation, diarrhea, GI upset, fatigue, chest pain/pressure, exercise intolerance, dyspnea, palpitations, syncope and pedal edema.      Anxiety, patient denies significant weight loss/gain, insomnia, hypersomnia, psychomotor agitation, psychomotor retardation, fatigue (loss of energy), feelings of worthlessness (guilt), impaired concentration (indecisiveness), thoughts of death or suicide.         The following portions of the patient's history were reviewed and updated as appropriate: allergies, current medications, past family history, past medical history, past social history, past surgical history and problem list.    Past Medical History:   Diagnosis Date   • Cataract    • Delivery of     • Hypertension      Past Surgical History:   Procedure Laterality Date   • BREAST BIOPSY     • CATARACT EXTRACTION Right    • MOUTH SURGERY      Top 2 molar/oral surgery     Family History   Problem Relation Age of Onset   • Heart disease Father    • Kidney disease Father    • Diabetes Maternal Grandmother    • Breast cancer Sister      Social History     Tobacco Use   • Smoking status: Never Smoker   Substance Use Topics   • Alcohol use: Yes     Alcohol/week: 3.0 standard drinks     Types: 3 Glasses of wine per week         Current Outpatient Medications:   •  atorvastatin (LIPITOR) 20 MG tablet, Take 1 tablet by mouth Daily., Disp: 90 tablet, Rfl: 1  •  lisinopril (PRINIVIL,ZESTRIL) 20 MG tablet, Take 1 tablet by mouth Daily., Disp: 90 tablet, Rfl: 1  •  PARoxetine (PAXIL) 10 MG tablet, Take 1 tablet by mouth Daily., Disp: 90 tablet, Rfl: 1      Review of Systems       A full 12 point review of systems has been obtained as mentioned in  "HPI, otherwise negative      Vitals:    07/08/20 1540   BP: 150/77   BP Location: Left arm   Patient Position: Sitting   Cuff Size: Large Adult   Pulse: 65   Temp: 98 °F (36.7 °C)   TempSrc: Skin   SpO2: 98%   Weight: 110 kg (242 lb 12.8 oz)   Height: 163.8 cm (64.49\")     Body mass index is 41.05 kg/m².    Physical Exam   Constitutional: She is oriented to person, place, and time. She appears well-developed and well-nourished. No distress.   Eyes: Pupils are equal, round, and reactive to light.   Neck: Normal range of motion. Neck supple. No JVD present. No thyromegaly present.   Cardiovascular: Normal rate, regular rhythm, normal heart sounds and intact distal pulses.   No murmur heard.  Pulmonary/Chest: Effort normal and breath sounds normal. No respiratory distress.   Abdominal: Soft. Bowel sounds are normal. She exhibits no distension. There is no tenderness.   Musculoskeletal: Normal range of motion. She exhibits no tenderness.   Neurological: She is alert and oriented to person, place, and time. No sensory deficit.   Skin: Skin is warm and dry. She is not diaphoretic.   Psychiatric: She has a normal mood and affect. Her behavior is normal. Judgment and thought content normal.   Nursing note and vitals reviewed.      No visits with results within 7 Day(s) from this visit.   Latest known visit with results is:   Office Visit on 01/08/2020   Component Date Value Ref Range Status   • WBC 01/08/2020 9.83  3.40 - 10.80 10*3/mm3 Final   • RBC 01/08/2020 4.98  3.77 - 5.28 10*6/mm3 Final   • Hemoglobin 01/08/2020 13.4  12.0 - 15.9 g/dL Final   • Hematocrit 01/08/2020 41.3  34.0 - 46.6 % Final   • MCV 01/08/2020 82.9  79.0 - 97.0 fL Final   • MCH 01/08/2020 26.9  26.6 - 33.0 pg Final   • MCHC 01/08/2020 32.4  31.5 - 35.7 g/dL Final   • RDW 01/08/2020 14.1  12.3 - 15.4 % Final   • RDW-SD 01/08/2020 42.7  37.0 - 54.0 fl Final   • MPV 01/08/2020 11.1  6.0 - 12.0 fL Final   • Platelets 01/08/2020 244  140 - 450 10*3/mm3 " Final   • Total Cholesterol 01/08/2020 194  0 - 200 mg/dL Final   • Triglycerides 01/08/2020 316* 0 - 150 mg/dL Final   • HDL Cholesterol 01/08/2020 54  40 - 60 mg/dL Final   • LDL Cholesterol  01/08/2020 77  0 - 100 mg/dL Final   • VLDL Cholesterol 01/08/2020 63.2* 5 - 40 mg/dL Final   • LDL/HDL Ratio 01/08/2020 1.42   Final   • TSH 01/08/2020 1.240  0.270 - 4.200 uIU/mL Final   • Hemoglobin A1C 01/08/2020 6.0* 3.5 - 5.6 % Final   • Glucose 01/08/2020 101* 65 - 99 mg/dL Final   • BUN 01/08/2020 17  6 - 20 mg/dL Final   • Creatinine 01/08/2020 0.74  0.57 - 1.00 mg/dL Final   • Sodium 01/08/2020 139  136 - 145 mmol/L Final   • Potassium 01/08/2020 4.4  3.5 - 5.2 mmol/L Final   • Chloride 01/08/2020 99  98 - 107 mmol/L Final   • CO2 01/08/2020 25.1  22.0 - 29.0 mmol/L Final   • Calcium 01/08/2020 9.8  8.6 - 10.5 mg/dL Final   • Total Protein 01/08/2020 8.0  6.0 - 8.5 g/dL Final   • Albumin 01/08/2020 4.30  3.50 - 5.20 g/dL Final   • ALT (SGPT) 01/08/2020 17  1 - 33 U/L Final   • AST (SGOT) 01/08/2020 16  1 - 32 U/L Final   • Alkaline Phosphatase 01/08/2020 98  39 - 117 U/L Final   • Total Bilirubin 01/08/2020 0.2  0.2 - 1.2 mg/dL Final   • eGFR Non African Amer 01/08/2020 82  >60 mL/min/1.73 Final   • Globulin 01/08/2020 3.7  gm/dL Final   • A/G Ratio 01/08/2020 1.2  g/dL Final   • BUN/Creatinine Ratio 01/08/2020 23.0  7.0 - 25.0 Final   • Anion Gap 01/08/2020 14.9  5.0 - 15.0 mmol/L Final       Diagnoses and all orders for this visit:    1. Essential hypertension (Primary)    2. Mixed hyperlipidemia    3. Postmenopausal    4. Colon cancer screening  -     Cologuard - Stool, Per Rectum; Future    5. Anxiety    6. Depression, unspecified depression type    Other orders  -     atorvastatin (LIPITOR) 20 MG tablet; Take 1 tablet by mouth Daily.  Dispense: 90 tablet; Refill: 1  -     PARoxetine (PAXIL) 10 MG tablet; Take 1 tablet by mouth Daily.  Dispense: 90 tablet; Refill: 1  -     lisinopril (PRINIVIL,ZESTRIL) 20 MG  tablet; Take 1 tablet by mouth Daily.  Dispense: 90 tablet; Refill: 1      Conditions are stable, refill medications above  Ordered Cologuard  Reviewed previous labs, discussed healthy heart diet, limiting fatty, fried, greasy foods and increasing exercise habits  Patient is now postmenopausal with no menses since March of 2019, recommend Pap smear every 2 years, will collect at next visit January 2021  Mammogram negative and up-to-date, due January 2021  Will plan hypertension panel 1 week prior 6-month follow-up, Pap and med refills.

## 2020-07-09 ENCOUNTER — RESULTS ENCOUNTER (OUTPATIENT)
Dept: FAMILY MEDICINE CLINIC | Facility: CLINIC | Age: 55
End: 2020-07-09

## 2020-07-09 DIAGNOSIS — Z12.11 COLON CANCER SCREENING: ICD-10-CM

## 2020-08-26 ENCOUNTER — OFFICE VISIT (OUTPATIENT)
Dept: FAMILY MEDICINE CLINIC | Facility: CLINIC | Age: 55
End: 2020-08-26

## 2020-08-26 VITALS
BODY MASS INDEX: 42.51 KG/M2 | WEIGHT: 249 LBS | HEART RATE: 72 BPM | DIASTOLIC BLOOD PRESSURE: 84 MMHG | HEIGHT: 64 IN | OXYGEN SATURATION: 97 % | SYSTOLIC BLOOD PRESSURE: 135 MMHG | TEMPERATURE: 97.3 F

## 2020-08-26 DIAGNOSIS — M77.11 LATERAL EPICONDYLITIS OF RIGHT ELBOW: Primary | ICD-10-CM

## 2020-08-26 PROCEDURE — 99213 OFFICE O/P EST LOW 20 MIN: CPT | Performed by: NURSE PRACTITIONER

## 2020-08-26 RX ORDER — METHYLPREDNISOLONE 4 MG/1
TABLET ORAL
Qty: 21 TABLET | Refills: 0 | Status: SHIPPED | OUTPATIENT
Start: 2020-08-26 | End: 2021-01-11

## 2020-08-26 RX ORDER — IBUPROFEN 800 MG/1
800 TABLET ORAL EVERY 8 HOURS PRN
Qty: 90 TABLET | Refills: 1 | Status: SHIPPED | OUTPATIENT
Start: 2020-08-26 | End: 2023-01-12

## 2020-08-26 NOTE — PROGRESS NOTES
"  Gena Harper is a 54 y.o. female.     Chief Complaint   Patient presents with   • Elbow Pain     more like a dull pain, no injury       Elbow Pain   This is a new problem. The current episode started 1 to 4 weeks ago. The problem occurs intermittently. The problem has been gradually worsening. Associated symptoms include arthralgias, fatigue, headaches and neck pain. Pertinent negatives include no abdominal pain, anorexia, change in bowel habit, chest pain, chills, congestion, coughing, diaphoresis, fever, joint swelling, myalgias, nausea, numbness, rash, sore throat, swollen glands, urinary symptoms, vertigo, visual change, vomiting or weakness. The symptoms are aggravated by bending.   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     Subjective     Visit Vitals  /84 (BP Location: Left arm, Patient Position: Sitting, Cuff Size: Large Adult)   Pulse 72   Temp 97.3 °F (36.3 °C) (Skin)   Ht 163.8 cm (64.49\")   Wt 113 kg (249 lb)   SpO2 97%   BMI 42.10 kg/m²       The following portions of the patient's history were reviewed and updated as appropriate: allergies, current medications, past family history, past medical history, past social history, past surgical history and problem list.    Review of Systems   Constitutional: Positive for fatigue. Negative for chills, diaphoresis and fever.   HENT: Negative for congestion, sore throat and swollen glands.    Respiratory: Negative for cough.    Cardiovascular: Negative for chest pain.   Gastrointestinal: Negative for abdominal pain, anorexia, change in bowel habit, nausea and vomiting.   Musculoskeletal: Positive for arthralgias and neck pain. Negative for joint swelling and myalgias.   Skin: Negative for rash.   Neurological: Negative for vertigo, weakness and numbness.       Objective     Physical Exam   Constitutional: She is oriented to person, place, and time. She appears well-developed and well-nourished. No " distress.   HENT:   Head: Normocephalic.   Eyes: Conjunctivae are normal.   Neck: Normal range of motion. Neck supple.   Pulmonary/Chest: Effort normal.   Musculoskeletal: Normal range of motion. She exhibits tenderness. She exhibits no edema.   Neurological: She is alert and oriented to person, place, and time. No sensory deficit. Cranial nerve deficit: right lateral epicondyle ttp, mild dawson rom, radiation of pain into bicep and forearm.   Skin: Skin is warm and dry. No rash noted. She is not diaphoretic. No erythema.   Psychiatric: She has a normal mood and affect. Her behavior is normal. Judgment normal.         Assessment/Plan   Gena was seen today for elbow pain.    Diagnoses and all orders for this visit:    Lateral epicondylitis of right elbow  Comments:  RICE, try tennis elbow strap, limit repetitive motion, start medrol and ibuprofen prn, rtc if wonb in 1-2 weeks.   Orders:  -     methylPREDNISolone (MEDROL) 4 MG dose pack; Take as directed on package instructions.  -     ibuprofen (ADVIL,MOTRIN) 800 MG tablet; Take 1 tablet by mouth Every 8 (Eight) Hours As Needed for Moderate Pain .                   Glucose   Date Value Ref Range Status   01/08/2020 101 (H) 65 - 99 mg/dL Final     BUN   Date Value Ref Range Status   01/08/2020 17 6 - 20 mg/dL Final     Creatinine   Date Value Ref Range Status   01/08/2020 0.74 0.57 - 1.00 mg/dL Final     Sodium   Date Value Ref Range Status   01/08/2020 139 136 - 145 mmol/L Final     Potassium   Date Value Ref Range Status   01/08/2020 4.4 3.5 - 5.2 mmol/L Final     Chloride   Date Value Ref Range Status   01/08/2020 99 98 - 107 mmol/L Final     CO2   Date Value Ref Range Status   01/08/2020 25.1 22.0 - 29.0 mmol/L Final     Calcium   Date Value Ref Range Status   01/08/2020 9.8 8.6 - 10.5 mg/dL Final     Total Protein   Date Value Ref Range Status   01/08/2020 8.0 6.0 - 8.5 g/dL Final     Albumin   Date Value Ref Range Status   01/08/2020 4.30 3.50 - 5.20 g/dL Final      ALT (SGPT)   Date Value Ref Range Status   01/08/2020 17 1 - 33 U/L Final     AST (SGOT)   Date Value Ref Range Status   01/08/2020 16 1 - 32 U/L Final     Alkaline Phosphatase   Date Value Ref Range Status   01/08/2020 98 39 - 117 U/L Final     Total Bilirubin   Date Value Ref Range Status   01/08/2020 0.2 0.2 - 1.2 mg/dL Final     eGFR Non  Amer   Date Value Ref Range Status   01/08/2020 82 >60 mL/min/1.73 Final     A/G Ratio   Date Value Ref Range Status   12/13/2018 1.1 1.0 - 1.7 Final     BUN/Creatinine Ratio   Date Value Ref Range Status   01/08/2020 23.0 7.0 - 25.0 Final     Anion Gap   Date Value Ref Range Status   01/08/2020 14.9 5.0 - 15.0 mmol/L Final     Answers for HPI/ROS submitted by the patient on 8/25/2020   What is the primary reason for your visit?: Other  Please describe your symptoms.: Right elbow pain.  Started a couple weeks ago and has gotten worse. Have lost strength in my right arm due to pain. Elbow pain causes pain in my muscles when I use them.  Have you had these symptoms before?: No  How long have you been having these symptoms?: Greater than 2 weeks

## 2020-12-23 RX ORDER — LISINOPRIL 20 MG/1
20 TABLET ORAL DAILY
Qty: 90 TABLET | Refills: 1 | Status: SHIPPED | OUTPATIENT
Start: 2020-12-23 | End: 2021-06-21

## 2020-12-23 RX ORDER — ATORVASTATIN CALCIUM 20 MG/1
20 TABLET, FILM COATED ORAL DAILY
Qty: 90 TABLET | Refills: 1 | Status: SHIPPED | OUTPATIENT
Start: 2020-12-23 | End: 2021-06-21

## 2020-12-23 RX ORDER — PAROXETINE 10 MG/1
10 TABLET, FILM COATED ORAL DAILY
Qty: 90 TABLET | Refills: 1 | Status: SHIPPED | OUTPATIENT
Start: 2020-12-23 | End: 2021-06-21

## 2021-01-04 ENCOUNTER — LAB (OUTPATIENT)
Dept: FAMILY MEDICINE CLINIC | Facility: CLINIC | Age: 56
End: 2021-01-04

## 2021-01-04 DIAGNOSIS — Z86.2 HISTORY OF ANEMIA: ICD-10-CM

## 2021-01-04 DIAGNOSIS — I10 ESSENTIAL HYPERTENSION: Primary | ICD-10-CM

## 2021-01-04 DIAGNOSIS — E78.2 MULTIPLE-TYPE HYPERLIPIDEMIA: ICD-10-CM

## 2021-01-04 PROCEDURE — 85027 COMPLETE CBC AUTOMATED: CPT | Performed by: NURSE PRACTITIONER

## 2021-01-04 PROCEDURE — 84443 ASSAY THYROID STIM HORMONE: CPT | Performed by: NURSE PRACTITIONER

## 2021-01-04 PROCEDURE — 36415 COLL VENOUS BLD VENIPUNCTURE: CPT | Performed by: NURSE PRACTITIONER

## 2021-01-04 PROCEDURE — 80053 COMPREHEN METABOLIC PANEL: CPT | Performed by: NURSE PRACTITIONER

## 2021-01-04 PROCEDURE — 80061 LIPID PANEL: CPT | Performed by: NURSE PRACTITIONER

## 2021-01-05 LAB
ALBUMIN SERPL-MCNC: 4 G/DL (ref 3.5–5.2)
ALBUMIN/GLOB SERPL: 1.2 G/DL
ALP SERPL-CCNC: 101 U/L (ref 39–117)
ALT SERPL W P-5'-P-CCNC: 16 U/L (ref 1–33)
ANION GAP SERPL CALCULATED.3IONS-SCNC: 10.8 MMOL/L (ref 5–15)
AST SERPL-CCNC: 15 U/L (ref 1–32)
BILIRUB SERPL-MCNC: 0.3 MG/DL (ref 0–1.2)
BUN SERPL-MCNC: 14 MG/DL (ref 6–20)
BUN/CREAT SERPL: 19.7 (ref 7–25)
CALCIUM SPEC-SCNC: 10.1 MG/DL (ref 8.6–10.5)
CHLORIDE SERPL-SCNC: 101 MMOL/L (ref 98–107)
CHOLEST SERPL-MCNC: 201 MG/DL (ref 0–200)
CO2 SERPL-SCNC: 26.2 MMOL/L (ref 22–29)
CREAT SERPL-MCNC: 0.71 MG/DL (ref 0.57–1)
DEPRECATED RDW RBC AUTO: 39.6 FL (ref 37–54)
ERYTHROCYTE [DISTWIDTH] IN BLOOD BY AUTOMATED COUNT: 14 % (ref 12.3–15.4)
GFR SERPL CREATININE-BSD FRML MDRD: 86 ML/MIN/1.73
GLOBULIN UR ELPH-MCNC: 3.4 GM/DL
GLUCOSE SERPL-MCNC: 137 MG/DL (ref 65–99)
HCT VFR BLD AUTO: 35.9 % (ref 34–46.6)
HDLC SERPL-MCNC: 52 MG/DL (ref 40–60)
HGB BLD-MCNC: 11.7 G/DL (ref 12–15.9)
LDLC SERPL CALC-MCNC: 104 MG/DL (ref 0–100)
LDLC/HDLC SERPL: 1.85 {RATIO}
MCH RBC QN AUTO: 25.9 PG (ref 26.6–33)
MCHC RBC AUTO-ENTMCNC: 32.6 G/DL (ref 31.5–35.7)
MCV RBC AUTO: 79.4 FL (ref 79–97)
PLATELET # BLD AUTO: 236 10*3/MM3 (ref 140–450)
PMV BLD AUTO: 11 FL (ref 6–12)
POTASSIUM SERPL-SCNC: 4.2 MMOL/L (ref 3.5–5.2)
PROT SERPL-MCNC: 7.4 G/DL (ref 6–8.5)
RBC # BLD AUTO: 4.52 10*6/MM3 (ref 3.77–5.28)
SODIUM SERPL-SCNC: 138 MMOL/L (ref 136–145)
TRIGL SERPL-MCNC: 265 MG/DL (ref 0–150)
TSH SERPL DL<=0.05 MIU/L-ACNC: 2.91 UIU/ML (ref 0.27–4.2)
VLDLC SERPL-MCNC: 45 MG/DL (ref 5–40)
WBC # BLD AUTO: 7.69 10*3/MM3 (ref 3.4–10.8)

## 2021-01-11 ENCOUNTER — OFFICE VISIT (OUTPATIENT)
Dept: FAMILY MEDICINE CLINIC | Facility: CLINIC | Age: 56
End: 2021-01-11

## 2021-01-11 VITALS
DIASTOLIC BLOOD PRESSURE: 83 MMHG | BODY MASS INDEX: 42.49 KG/M2 | OXYGEN SATURATION: 99 % | HEIGHT: 65 IN | TEMPERATURE: 97.5 F | SYSTOLIC BLOOD PRESSURE: 135 MMHG | WEIGHT: 255 LBS | HEART RATE: 77 BPM

## 2021-01-11 DIAGNOSIS — F41.9 ANXIETY: ICD-10-CM

## 2021-01-11 DIAGNOSIS — Z12.31 BREAST CANCER SCREENING BY MAMMOGRAM: ICD-10-CM

## 2021-01-11 DIAGNOSIS — R73.09 ABNORMAL GLUCOSE: ICD-10-CM

## 2021-01-11 DIAGNOSIS — E78.2 MULTIPLE-TYPE HYPERLIPIDEMIA: ICD-10-CM

## 2021-01-11 DIAGNOSIS — D64.9 ANEMIA, UNSPECIFIED TYPE: ICD-10-CM

## 2021-01-11 DIAGNOSIS — I10 ESSENTIAL HYPERTENSION: Primary | ICD-10-CM

## 2021-01-11 DIAGNOSIS — F32.A DEPRESSION, UNSPECIFIED DEPRESSION TYPE: ICD-10-CM

## 2021-01-11 PROCEDURE — 99214 OFFICE O/P EST MOD 30 MIN: CPT | Performed by: NURSE PRACTITIONER

## 2021-01-11 NOTE — PROGRESS NOTES
"Chief Complaint  Follow-up (6 month follow up and discuss lab results )    Subjective          Gena Harper presents to [facility] for   History of Present Illness     HTN, stable on meds and takes as directed, denies chest pain, headache, shortness of air, palpitations and swelling of extremities.     Hyperlipidemia/hypertrigs, flax seed and statin, the patient denies muscle aches, constipation, diarrhea, GI upset, fatigue, chest pain/pressure, exercise intolerance, dyspnea, palpitations, syncope and pedal edema.      Anxiety, patient denies significant weight loss/gain, insomnia, hypersomnia, psychomotor agitation, psychomotor retardation, fatigue (loss of energy), feelings of worthlessness (guilt), impaired concentration (indecisiveness), thoughts of death or suicide.      Anemia, pt on iron daily OTC for several years. Pt reports fatigue.  Denies blood in stool, pt gave blood in December.       Patient is here to review labs       Objective   Vital Signs:   /83 (BP Location: Left arm, Patient Position: Sitting, Cuff Size: Large Adult)   Pulse 77   Temp 97.5 °F (36.4 °C) (Skin)   Ht 163.8 cm (64.5\")   Wt 116 kg (255 lb)   SpO2 99%   BMI 43.09 kg/m²       Physical Exam  Vitals signs and nursing note reviewed.   Constitutional:       General: She is not in acute distress.     Appearance: She is well-developed. She is not diaphoretic.   Eyes:      Pupils: Pupils are equal, round, and reactive to light.   Neck:      Musculoskeletal: Normal range of motion and neck supple.      Thyroid: No thyromegaly.      Vascular: No JVD.   Cardiovascular:      Rate and Rhythm: Normal rate and regular rhythm.      Heart sounds: Normal heart sounds. No murmur.   Pulmonary:      Effort: Pulmonary effort is normal. No respiratory distress.      Breath sounds: Normal breath sounds.   Abdominal:      General: Bowel sounds are normal. There is no distension.      Palpations: Abdomen is soft. "      Tenderness: There is no abdominal tenderness.   Musculoskeletal: Normal range of motion.         General: No tenderness.   Skin:     General: Skin is warm and dry.   Neurological:      Mental Status: She is alert and oriented to person, place, and time.      Sensory: No sensory deficit.   Psychiatric:         Behavior: Behavior normal.         Thought Content: Thought content normal.         Judgment: Judgment normal.        Result Review :     Common labs    Common Labsle 1/4/21 1/4/21 1/4/21    0834 0834 0834   BUN  14    Creatinine  0.71    eGFR Non African Am  86    Sodium  138    Potassium  4.2    Chloride  101    Calcium  10.1    Albumin  4.00    Total Bilirubin  0.3    Alkaline Phosphatase  101    AST (SGOT)  15    ALT (SGPT)  16    WBC 7.69     Hemoglobin 11.7 (A)     Hematocrit 35.9     Platelets 236     Triglycerides   265 (A)   HDL Cholesterol   52   LDL Cholesterol    104 (A)   (A) Abnormal value            TSH    TSH 1/4/21   TSH 2.910                             Assessment and Plan    Problem List Items Addressed This Visit        Cardiac and Vasculature    Hypertension - Primary    Relevant Orders    CBC & Differential    Basic Metabolic Panel    Multiple-type hyperlipidemia    Relevant Orders    Lipid Panel      Other Visit Diagnoses     Anxiety        Depression, unspecified depression type        Anemia, unspecified type        Abnormal glucose        Relevant Orders    Hemoglobin A1c    Breast cancer screening by mammogram        Relevant Orders    Mammo Screening Digital Tomosynthesis Bilateral With CAD        1.  BP stable, continue lisinopril, refilled on 12/23  2.  Reviewed labs, lipids elevated but triglycerides improved.  Recommend fish oil OTC, continue flaxseed and atorvastatin.  3.  Encouraged healthy heart diet and weight loss  4.  Low sodium, less than 2000 mg/day, increase water intake  5.  Anxiety stable, continue paroxetine refilled on 12/23  6.  Hemoglobin slightly low again,  continue OTC iron, repeat CBC in 3 months   7.  Plan to repeat BMP, CBC, hemoglobin A1c and lipid panel prior to 3-month follow-up appointment  8.  Ordered mammogram    I spent 25 minutes caring for Gena on this date of service. This time includes time spent by me in the following activities:preparing for the visit, reviewing tests, performing a medically appropriate examination and/or evaluation , counseling and educating the patient/family/caregiver, ordering medications, tests, or procedures and documenting information in the medical record     Follow Up   Return in about 3 months (around 4/11/2021) for labs as ordered and follow up discussion.  Patient was given instructions and counseling regarding her condition or for health maintenance advice. Please see specific information pulled into the AVS if appropriate.

## 2021-01-27 ENCOUNTER — HOSPITAL ENCOUNTER (OUTPATIENT)
Dept: MAMMOGRAPHY | Facility: HOSPITAL | Age: 56
Discharge: HOME | End: 2021-01-27
Admitting: NURSE PRACTITIONER

## 2021-01-27 DIAGNOSIS — Z12.31 BREAST CANCER SCREENING BY MAMMOGRAM: ICD-10-CM

## 2021-01-27 PROCEDURE — 77063 BREAST TOMOSYNTHESIS BI: CPT

## 2021-01-27 PROCEDURE — 77067 SCR MAMMO BI INCL CAD: CPT

## 2021-04-05 ENCOUNTER — LAB (OUTPATIENT)
Dept: FAMILY MEDICINE CLINIC | Facility: CLINIC | Age: 56
End: 2021-04-05

## 2021-04-05 DIAGNOSIS — R73.09 ABNORMAL GLUCOSE: ICD-10-CM

## 2021-04-05 DIAGNOSIS — E78.2 MULTIPLE-TYPE HYPERLIPIDEMIA: ICD-10-CM

## 2021-04-05 DIAGNOSIS — I10 ESSENTIAL HYPERTENSION: ICD-10-CM

## 2021-04-05 PROCEDURE — 85025 COMPLETE CBC W/AUTO DIFF WBC: CPT | Performed by: NURSE PRACTITIONER

## 2021-04-05 PROCEDURE — 36415 COLL VENOUS BLD VENIPUNCTURE: CPT | Performed by: NURSE PRACTITIONER

## 2021-04-05 PROCEDURE — 83036 HEMOGLOBIN GLYCOSYLATED A1C: CPT | Performed by: NURSE PRACTITIONER

## 2021-04-05 PROCEDURE — 80048 BASIC METABOLIC PNL TOTAL CA: CPT | Performed by: NURSE PRACTITIONER

## 2021-04-05 PROCEDURE — 80061 LIPID PANEL: CPT | Performed by: NURSE PRACTITIONER

## 2021-04-06 LAB
ANION GAP SERPL CALCULATED.3IONS-SCNC: 11.5 MMOL/L (ref 5–15)
BASOPHILS # BLD AUTO: 0.04 10*3/MM3 (ref 0–0.2)
BASOPHILS NFR BLD AUTO: 0.5 % (ref 0–1.5)
BUN SERPL-MCNC: 13 MG/DL (ref 6–20)
BUN/CREAT SERPL: 20 (ref 7–25)
CALCIUM SPEC-SCNC: 9.2 MG/DL (ref 8.6–10.5)
CHLORIDE SERPL-SCNC: 101 MMOL/L (ref 98–107)
CHOLEST SERPL-MCNC: 184 MG/DL (ref 0–200)
CO2 SERPL-SCNC: 25.5 MMOL/L (ref 22–29)
CREAT SERPL-MCNC: 0.65 MG/DL (ref 0.57–1)
DEPRECATED RDW RBC AUTO: 42.1 FL (ref 37–54)
EOSINOPHIL # BLD AUTO: 0.54 10*3/MM3 (ref 0–0.4)
EOSINOPHIL NFR BLD AUTO: 7.1 % (ref 0.3–6.2)
ERYTHROCYTE [DISTWIDTH] IN BLOOD BY AUTOMATED COUNT: 14.6 % (ref 12.3–15.4)
GFR SERPL CREATININE-BSD FRML MDRD: 95 ML/MIN/1.73
GLUCOSE SERPL-MCNC: 150 MG/DL (ref 65–99)
HBA1C MFR BLD: 7.9 % (ref 3.5–5.6)
HCT VFR BLD AUTO: 38.2 % (ref 34–46.6)
HDLC SERPL-MCNC: 48 MG/DL (ref 40–60)
HGB BLD-MCNC: 12.5 G/DL (ref 12–15.9)
IMM GRANULOCYTES # BLD AUTO: 0.04 10*3/MM3 (ref 0–0.05)
IMM GRANULOCYTES NFR BLD AUTO: 0.5 % (ref 0–0.5)
LDLC SERPL CALC-MCNC: 96 MG/DL (ref 0–100)
LDLC/HDLC SERPL: 1.85 {RATIO}
LYMPHOCYTES # BLD AUTO: 3.2 10*3/MM3 (ref 0.7–3.1)
LYMPHOCYTES NFR BLD AUTO: 42 % (ref 19.6–45.3)
MCH RBC QN AUTO: 26.1 PG (ref 26.6–33)
MCHC RBC AUTO-ENTMCNC: 32.7 G/DL (ref 31.5–35.7)
MCV RBC AUTO: 79.7 FL (ref 79–97)
MONOCYTES # BLD AUTO: 0.51 10*3/MM3 (ref 0.1–0.9)
MONOCYTES NFR BLD AUTO: 6.7 % (ref 5–12)
NEUTROPHILS NFR BLD AUTO: 3.29 10*3/MM3 (ref 1.7–7)
NEUTROPHILS NFR BLD AUTO: 43.2 % (ref 42.7–76)
NRBC BLD AUTO-RTO: 0 /100 WBC (ref 0–0.2)
PLATELET # BLD AUTO: 150 10*3/MM3 (ref 140–450)
PMV BLD AUTO: 12.3 FL (ref 6–12)
POTASSIUM SERPL-SCNC: 4.2 MMOL/L (ref 3.5–5.2)
RBC # BLD AUTO: 4.79 10*6/MM3 (ref 3.77–5.28)
SODIUM SERPL-SCNC: 138 MMOL/L (ref 136–145)
TRIGL SERPL-MCNC: 235 MG/DL (ref 0–150)
VLDLC SERPL-MCNC: 40 MG/DL (ref 5–40)
WBC # BLD AUTO: 7.62 10*3/MM3 (ref 3.4–10.8)

## 2021-04-12 ENCOUNTER — OFFICE VISIT (OUTPATIENT)
Dept: FAMILY MEDICINE CLINIC | Facility: CLINIC | Age: 56
End: 2021-04-12

## 2021-04-12 VITALS
BODY MASS INDEX: 41.99 KG/M2 | WEIGHT: 252 LBS | DIASTOLIC BLOOD PRESSURE: 96 MMHG | TEMPERATURE: 97.8 F | OXYGEN SATURATION: 97 % | SYSTOLIC BLOOD PRESSURE: 154 MMHG | HEIGHT: 65 IN | HEART RATE: 71 BPM

## 2021-04-12 DIAGNOSIS — I10 ESSENTIAL HYPERTENSION: Primary | ICD-10-CM

## 2021-04-12 DIAGNOSIS — E78.1 PURE HYPERTRIGLYCERIDEMIA: ICD-10-CM

## 2021-04-12 DIAGNOSIS — E11.65 TYPE 2 DIABETES MELLITUS WITH HYPERGLYCEMIA, WITHOUT LONG-TERM CURRENT USE OF INSULIN (HCC): ICD-10-CM

## 2021-04-12 DIAGNOSIS — Z86.2 HISTORY OF ANEMIA: ICD-10-CM

## 2021-04-12 PROCEDURE — 99214 OFFICE O/P EST MOD 30 MIN: CPT | Performed by: NURSE PRACTITIONER

## 2021-04-12 RX ORDER — BLOOD SUGAR DIAGNOSTIC
STRIP MISCELLANEOUS
Qty: 100 EACH | Refills: 11 | Status: SHIPPED | OUTPATIENT
Start: 2021-04-12

## 2021-04-12 RX ORDER — METFORMIN HYDROCHLORIDE 500 MG/1
500 TABLET, EXTENDED RELEASE ORAL 2 TIMES DAILY
Qty: 180 TABLET | Refills: 1 | Status: SHIPPED | OUTPATIENT
Start: 2021-04-12 | End: 2021-07-12

## 2021-04-12 RX ORDER — LANCETS 28 GAUGE
EACH MISCELLANEOUS
Qty: 100 EACH | Refills: 11 | Status: SHIPPED | OUTPATIENT
Start: 2021-04-12

## 2021-04-12 RX ORDER — BLOOD-GLUCOSE METER
KIT MISCELLANEOUS
Qty: 1 EACH | Refills: 0 | Status: SHIPPED | OUTPATIENT
Start: 2021-04-12

## 2021-04-12 NOTE — PATIENT INSTRUCTIONS
Diabetes Mellitus and Nutrition, Adult  When you have diabetes, or diabetes mellitus, it is very important to have healthy eating habits because your blood sugar (glucose) levels are greatly affected by what you eat and drink. Eating healthy foods in the right amounts, at about the same times every day, can help you:  · Control your blood glucose.  · Lower your risk of heart disease.  · Improve your blood pressure.  · Reach or maintain a healthy weight.  What can affect my meal plan?  Every person with diabetes is different, and each person has different needs for a meal plan. Your health care provider may recommend that you work with a dietitian to make a meal plan that is best for you. Your meal plan may vary depending on factors such as:  · The calories you need.  · The medicines you take.  · Your weight.  · Your blood glucose, blood pressure, and cholesterol levels.  · Your activity level.  · Other health conditions you have, such as heart or kidney disease.  How do carbohydrates affect me?  Carbohydrates, also called carbs, affect your blood glucose level more than any other type of food. Eating carbs naturally raises the amount of glucose in your blood. Carb counting is a method for keeping track of how many carbs you eat. Counting carbs is important to keep your blood glucose at a healthy level, especially if you use insulin or take certain oral diabetes medicines.  It is important to know how many carbs you can safely have in each meal. This is different for every person. Your dietitian can help you calculate how many carbs you should have at each meal and for each snack.  How does alcohol affect me?  Alcohol can cause a sudden decrease in blood glucose (hypoglycemia), especially if you use insulin or take certain oral diabetes medicines. Hypoglycemia can be a life-threatening condition. Symptoms of hypoglycemia, such as sleepiness, dizziness, and confusion, are similar to symptoms of having too much  "alcohol.  · Do not drink alcohol if:  ? Your health care provider tells you not to drink.  ? You are pregnant, may be pregnant, or are planning to become pregnant.  · If you drink alcohol:  ? Do not drink on an empty stomach.  ? Limit how much you use to:  § 0-1 drink a day for women.  § 0-2 drinks a day for men.  ? Be aware of how much alcohol is in your drink. In the [place] one drink equals one 12 oz bottle of beer (355 mL), one 5 oz glass of wine (148 mL), or one 1½ oz glass of hard liquor (44 mL).  ? Keep yourself hydrated with water, diet soda, or unsweetened iced tea.  § Keep in mind that regular soda, juice, and other mixers may contain a lot of sugar and must be counted as carbs.  What are tips for following this plan?    Reading food labels  · Start by checking the serving size on the \"Nutrition Facts\" label of packaged foods and drinks. The amount of calories, carbs, fats, and other nutrients listed on the label is based on one serving of the item. Many items contain more than one serving per package.  · Check the total grams (g) of carbs in one serving. You can calculate the number of servings of carbs in one serving by dividing the total carbs by 15. For example, if a food has 30 g of total carbs per serving, it would be equal to 2 servings of carbs.  · Check the number of grams (g) of saturated fats and trans fats in one serving. Choose foods that have a low amount or none of these fats.  · Check the number of milligrams (mg) of salt (sodium) in one serving. Most people should limit total sodium intake to less than 2,300 mg per day.  · Always check the nutrition information of foods labeled as \"low-fat\" or \"nonfat.\" These foods may be higher in added sugar or refined carbs and should be avoided.  · Talk to your dietitian to identify your daily goals for nutrients listed on the label.  Shopping  · Avoid buying canned, pre-made, or processed foods. These foods tend to be high in fat, sodium, and added " sugar.  · Shop around the outside edge of the grocery store. This is where you will most often find fresh fruits and vegetables, bulk grains, fresh meats, and fresh dairy.  Cooking  · Use low-heat cooking methods, such as baking, instead of high-heat cooking methods like deep frying.  · Cook using healthy oils, such as olive, canola, or sunflower oil.  · Avoid cooking with butter, cream, or high-fat meats.  Meal planning  · Eat meals and snacks regularly, preferably at the same times every day. Avoid going long periods of time without eating.  · Eat foods that are high in fiber, such as fresh fruits, vegetables, beans, and whole grains. Talk with your dietitian about how many servings of carbs you can eat at each meal.  · Eat 4-6 oz (112-168 g) of lean protein each day, such as lean meat, chicken, fish, eggs, or tofu. One ounce (oz) of lean protein is equal to:  ? 1 oz (28 g) of meat, chicken, or fish.  ? 1 egg.  ? ¼ cup (62 g) of tofu.  · Eat some foods each day that contain healthy fats, such as avocado, nuts, seeds, and fish.  What foods should I eat?  Fruits  Berries. Apples. Oranges. Peaches. Apricots. Plums. Grapes. [place]. Papaya. Pomegranate. Kiwi. Cherries.  Vegetables  Lettuce. Spinach. Leafy greens, including kale, chard, shannon greens, and mustard greens. Beets. Cauliflower. Cabbage. Broccoli. Carrots. Green beans. Tomatoes. Peppers. Onions. Cucumbers. [place] sprouts.  Grains  Whole grains, such as whole-wheat or whole-grain bread, crackers, tortillas, cereal, and pasta. Unsweetened oatmeal. Quinoa. Brown or wild rice.  Meats and other proteins  Seafood. Poultry without skin. Lean cuts of poultry and beef. Tofu. Nuts. Seeds.  Dairy  Low-fat or fat-free dairy products such as milk, yogurt, and cheese.  The items listed above may not be a complete list of foods and beverages you can eat. Contact a dietitian for more information.  What foods should I avoid?  Fruits  Fruits canned with  syrup.  Vegetables  Canned vegetables. Frozen vegetables with butter or cream sauce.  Grains  Refined white flour and flour products such as bread, pasta, snack foods, and cereals. Avoid all processed foods.  Meats and other proteins  Fatty cuts of meat. Poultry with skin. Breaded or fried meats. Processed meat. Avoid saturated fats.  Dairy  Full-fat yogurt, cheese, or milk.  Beverages  Sweetened drinks, such as soda or iced tea.  The items listed above may not be a complete list of foods and beverages you should avoid. Contact a dietitian for more information.  Questions to ask a health care provider  · Do I need to meet with a diabetes educator?  · Do I need to meet with a dietitian?  · What number can I call if I have questions?  · When are the best times to check my blood glucose?  Where to find more information:  · American Diabetes Association: diabetes.org  · Academy of Nutrition and Dietetics: www.eatright.org  · National [place] of Diabetes and Digestive and Kidney Diseases: www.niddk.nih.gov  · Association of Diabetes Care and Education Specialists: www.diabeteseducator.org  Summary  · It is important to have healthy eating habits because your blood sugar (glucose) levels are greatly affected by what you eat and drink.  · A healthy meal plan will help you control your blood glucose and maintain a healthy lifestyle.  · Your health care provider may recommend that you work with a dietitian to make a meal plan that is best for you.  · Keep in mind that carbohydrates (carbs) and alcohol have immediate effects on your blood glucose levels. It is important to count carbs and to use alcohol carefully.  This information is not intended to replace advice given to you by your health care provider. Make sure you discuss any questions you have with your health care provider.  Document Revised: 11/24/2020 Document Reviewed: 11/24/2020  Elsevier Patient Education © 2021 [employer].

## 2021-04-12 NOTE — PROGRESS NOTES
"Chief Complaint  Follow-up (follow up to review lab work)    Subjective          Gena Harper presents to [facility]  History of Present Illness     Here to review labs    Hx of abnormal glucose, and borderline diabetes in the past, denies any signs/symptoms of hyper/hypoglycemia, she does report occasional blurry vision she relates to computer eye fatigue, denies polydipsia, polyuria, nocturia, and unintentional weight loss, tried metformin IR in past and could not tolerate d/t nausea/GI upset.      Hx of anemia, takes slo iron otc, vit d3 and OTC vitamins/supplements    HTN, stable on meds and takes as directed, denies chest pain, headache, shortness of air, palpitations and swelling of extremities.     Hyperlipidemia, The patient denies muscle aches, constipation, diarrhea, GI upset, fatigue, chest pain/pressure, exercise intolerance, dyspnea, palpitations, syncope and pedal edema.          Objective   Vital Signs:   /96 (BP Location: Left arm, Patient Position: Sitting, Cuff Size: Adult)   Pulse 71   Temp 97.8 °F (36.6 °C) (Skin)   Ht 163.8 cm (64.5\")   Wt 114 kg (252 lb)   SpO2 97%   BMI 42.59 kg/m²       Physical Exam  Vitals and nursing note reviewed.   Constitutional:       General: She is not in acute distress.     Appearance: She is well-developed. She is not diaphoretic.   Eyes:      Pupils: Pupils are equal, round, and reactive to light.   Neck:      Thyroid: No thyromegaly.      Vascular: No JVD.   Cardiovascular:      Rate and Rhythm: Normal rate and regular rhythm.      Heart sounds: Normal heart sounds. No murmur heard.     Pulmonary:      Effort: Pulmonary effort is normal. No respiratory distress.      Breath sounds: Normal breath sounds.   Abdominal:      General: Bowel sounds are normal. There is no distension.      Palpations: Abdomen is soft.      Tenderness: There is no abdominal tenderness.   Musculoskeletal:         General: No tenderness. " Normal range of motion.      Cervical back: Normal range of motion and neck supple.   Skin:     General: Skin is warm and dry.   Neurological:      Mental Status: She is alert and oriented to person, place, and time.      Sensory: No sensory deficit.   Psychiatric:         Behavior: Behavior normal.         Thought Content: Thought content normal.         Judgment: Judgment normal.          Result Review :                 Assessment and Plan    Diagnoses and all orders for this visit:    1. Essential hypertension (Primary)  Comments:  Slightly elevated but stable outside of the office, continue lisinopril no refill needed at this time    2. Type 2 diabetes mellitus with hyperglycemia, without long-term current use of insulin (CMS/[facility])  Comments:  Reviewed labs, A1c up to 7.9.  Pt T/F Metformin IR in the past, start Metformin XR, glucometer to chk BG daily prn, education given, <100carbs/day, A1c in 3 mo  Orders:  -     metFORMIN ER (GLUCOPHAGE-XR) 500 MG 24 hr tablet; Take 1 tablet by mouth 2 (two) times a day.  Dispense: 180 tablet; Refill: 1  -     Lancets (freestyle) lancets; Use to check blood glucose daily and as needed DX: E11.65  Dispense: 100 each; Refill: 11  -     glucose monitoring kit (FREESTYLE) monitoring kit; Please provide glucometer on patient's formulary to check blood glucose daily and as needed. DX: E11.65  Dispense: 1 each; Refill: 0  -     glucose blood (FREESTYLE TEST STRIPS) test strip; Use to check blood glucose daily and as needed DX: E11.65  Dispense: 100 each; Refill: 11    3. History of anemia  Comments:  Hemoglobin in the normal range, hx of anemia following giving blood.  Recommend continue OTC slo-iron and supplements     4. Pure hypertriglyceridemia  Comments:  tirgs improved, cardiac risk 1.85, continue statin.  Patient also given healthy heart diet information to incorporate with diabetic diet.      1. Diet and lifestyle education/counseling given on diabetes and hyperlipidemia,  encouraged lifestyle modifications, exercise and weight loss  2. Patient instructed to notify me if blood glucose consistently running over 200 and to check once daily at different times of the day  3.  We discussed low-sodium diet, less than 2000 mg/day  4.  We also discussed diabetic diet,  Less than 100 carbohydrates per day  5.  Decrease sodas and increase water to 2 L/day  6.  We will plan to repeat labs in 3 months    I spent 30 minutes caring for Gena on this date of service. This time includes time spent by me in the following activities:preparing for the visit, reviewing tests, performing a medically appropriate examination and/or evaluation , counseling and educating the patient/family/caregiver, ordering medications, tests, or procedures and documenting information in the medical record     Follow Up   Return in about 3 months (around 7/12/2021) for DM, HTN, diabetic panel 1 wk prior to appt.  Patient was given instructions and counseling regarding her condition or for health maintenance advice. Please see specific information pulled into the AVS if appropriate.

## 2021-05-19 ENCOUNTER — TELEPHONE (OUTPATIENT)
Dept: FAMILY MEDICINE CLINIC | Facility: CLINIC | Age: 56
End: 2021-05-19

## 2021-05-19 NOTE — TELEPHONE ENCOUNTER
Caller: Gena Harper    Relationship: Self    Best call back number:[phone]    What medications are you currently taking:   Current Outpatient Medications on File Prior to Visit   Medication Sig Dispense Refill   • atorvastatin (LIPITOR) 20 MG tablet TAKE 1 TABLET BY MOUTH DAILY 90 tablet 1   • glucose blood (FREESTYLE TEST STRIPS) test strip Use to check blood glucose daily and as needed DX: E11.65 100 each 11   • glucose monitoring kit (FREESTYLE) monitoring kit Please provide glucometer on patient's formulary to check blood glucose daily and as needed. DX: E11.65 1 each 0   • ibuprofen (ADVIL,MOTRIN) 800 MG tablet Take 1 tablet by mouth Every 8 (Eight) Hours As Needed for Moderate Pain . 90 tablet 1   • Lancets (freestyle) lancets Use to check blood glucose daily and as needed DX: E11.65 100 each 11   • lisinopril (PRINIVIL,ZESTRIL) 20 MG tablet TAKE 1 TABLET BY MOUTH DAILY 90 tablet 1   • metFORMIN ER (GLUCOPHAGE-XR) 500 MG 24 hr tablet Take 1 tablet by mouth 2 (two) times a day. 180 tablet 1   • PARoxetine (PAXIL) 10 MG tablet TAKE 1 TABLET BY MOUTH DAILY 90 tablet 1     No current facility-administered medications on file prior to visit.        When did you start taking these medications: WHEN PATIENT STARTED THE MED. CALL PATIENT TO DISCUSS    Which medication are you concerned about: METFORMIN    Who prescribed you this medication: VANITA HERNANDEZ    What are your concerns: IT DOES NOT MAKE PATIENT FEEL GOOD. CAUSES DIARRHEA    How long have you been taking these medications: LESS THAN 2 MONTHS.

## 2021-05-20 RX ORDER — ORAL SEMAGLUTIDE 7 MG/1
1 TABLET ORAL
Qty: 30 TABLET | Refills: 0 | Status: SHIPPED | OUTPATIENT
Start: 2021-05-20 | End: 2021-07-12 | Stop reason: SDUPTHER

## 2021-05-20 NOTE — TELEPHONE ENCOUNTER
Have her stop in for Rybelsus 1st month sample. It has a copay card in the box and I will send the 2nd mo to pharmacy. Works best 30 min prior to food/drink/meds if she is able. Once she starts can stop metformin. Thanks

## 2021-06-21 RX ORDER — ATORVASTATIN CALCIUM 20 MG/1
20 TABLET, FILM COATED ORAL DAILY
Qty: 90 TABLET | Refills: 0 | Status: SHIPPED | OUTPATIENT
Start: 2021-06-21 | End: 2021-09-20

## 2021-06-21 RX ORDER — PAROXETINE 10 MG/1
10 TABLET, FILM COATED ORAL DAILY
Qty: 90 TABLET | Refills: 0 | Status: SHIPPED | OUTPATIENT
Start: 2021-06-21 | End: 2021-09-20

## 2021-06-21 RX ORDER — LISINOPRIL 20 MG/1
20 TABLET ORAL DAILY
Qty: 90 TABLET | Refills: 0 | Status: SHIPPED | OUTPATIENT
Start: 2021-06-21 | End: 2021-09-20

## 2021-07-06 ENCOUNTER — LAB (OUTPATIENT)
Dept: FAMILY MEDICINE CLINIC | Facility: CLINIC | Age: 56
End: 2021-07-06

## 2021-07-06 DIAGNOSIS — I10 ESSENTIAL HYPERTENSION: ICD-10-CM

## 2021-07-06 DIAGNOSIS — E11.65 TYPE 2 DIABETES MELLITUS WITH HYPERGLYCEMIA, WITHOUT LONG-TERM CURRENT USE OF INSULIN (HCC): Primary | ICD-10-CM

## 2021-07-06 PROCEDURE — 83036 HEMOGLOBIN GLYCOSYLATED A1C: CPT | Performed by: NURSE PRACTITIONER

## 2021-07-06 PROCEDURE — 85027 COMPLETE CBC AUTOMATED: CPT | Performed by: NURSE PRACTITIONER

## 2021-07-06 PROCEDURE — 36415 COLL VENOUS BLD VENIPUNCTURE: CPT | Performed by: NURSE PRACTITIONER

## 2021-07-06 PROCEDURE — 84443 ASSAY THYROID STIM HORMONE: CPT | Performed by: NURSE PRACTITIONER

## 2021-07-06 PROCEDURE — 80053 COMPREHEN METABOLIC PANEL: CPT | Performed by: NURSE PRACTITIONER

## 2021-07-06 PROCEDURE — 80061 LIPID PANEL: CPT | Performed by: NURSE PRACTITIONER

## 2021-07-07 ENCOUNTER — TELEPHONE (OUTPATIENT)
Dept: FAMILY MEDICINE CLINIC | Facility: CLINIC | Age: 56
End: 2021-07-07

## 2021-07-07 LAB
ALBUMIN SERPL-MCNC: 4.3 G/DL (ref 3.5–5.2)
ALBUMIN/GLOB SERPL: 1.4 G/DL
ALP SERPL-CCNC: 91 U/L (ref 39–117)
ALT SERPL W P-5'-P-CCNC: 18 U/L (ref 1–33)
ANION GAP SERPL CALCULATED.3IONS-SCNC: 11.9 MMOL/L (ref 5–15)
AST SERPL-CCNC: 15 U/L (ref 1–32)
BILIRUB SERPL-MCNC: 0.4 MG/DL (ref 0–1.2)
BUN SERPL-MCNC: 14 MG/DL (ref 6–20)
BUN/CREAT SERPL: 16.7 (ref 7–25)
CALCIUM SPEC-SCNC: 9.4 MG/DL (ref 8.6–10.5)
CHLORIDE SERPL-SCNC: 103 MMOL/L (ref 98–107)
CHOLEST SERPL-MCNC: 166 MG/DL (ref 0–200)
CO2 SERPL-SCNC: 24.1 MMOL/L (ref 22–29)
CREAT SERPL-MCNC: 0.84 MG/DL (ref 0.57–1)
DEPRECATED RDW RBC AUTO: 42.4 FL (ref 37–54)
ERYTHROCYTE [DISTWIDTH] IN BLOOD BY AUTOMATED COUNT: 14.4 % (ref 12.3–15.4)
GFR SERPL CREATININE-BSD FRML MDRD: 71 ML/MIN/1.73
GLOBULIN UR ELPH-MCNC: 3 GM/DL
GLUCOSE SERPL-MCNC: 97 MG/DL (ref 65–99)
HBA1C MFR BLD: 6 % (ref 3.5–5.6)
HCT VFR BLD AUTO: 39.7 % (ref 34–46.6)
HDLC SERPL-MCNC: 48 MG/DL (ref 40–60)
HGB BLD-MCNC: 12.7 G/DL (ref 12–15.9)
LDLC SERPL CALC-MCNC: 86 MG/DL (ref 0–100)
LDLC/HDLC SERPL: 1.68 {RATIO}
MCH RBC QN AUTO: 25.9 PG (ref 26.6–33)
MCHC RBC AUTO-ENTMCNC: 32 G/DL (ref 31.5–35.7)
MCV RBC AUTO: 81 FL (ref 79–97)
PLATELET # BLD AUTO: 216 10*3/MM3 (ref 140–450)
PMV BLD AUTO: 11.8 FL (ref 6–12)
POTASSIUM SERPL-SCNC: 4 MMOL/L (ref 3.5–5.2)
PROT SERPL-MCNC: 7.3 G/DL (ref 6–8.5)
RBC # BLD AUTO: 4.9 10*6/MM3 (ref 3.77–5.28)
SODIUM SERPL-SCNC: 139 MMOL/L (ref 136–145)
TRIGL SERPL-MCNC: 188 MG/DL (ref 0–150)
TSH SERPL DL<=0.05 MIU/L-ACNC: 1.43 UIU/ML (ref 0.27–4.2)
VLDLC SERPL-MCNC: 32 MG/DL (ref 5–40)
WBC # BLD AUTO: 7.02 10*3/MM3 (ref 3.4–10.8)

## 2021-07-07 NOTE — TELEPHONE ENCOUNTER
Spoke to pt and let her know the A1C was processing at the time that she checked.  It has resulted and pt notified.

## 2021-07-07 NOTE — TELEPHONE ENCOUNTER
Caller: Gena Harper    Relationship: Self    Best call back number: [phone]    Caller requesting test results: PATIENT    What test was performed: LAB    When was the test performed: 7/6/21    Additional notes: PATIENT SAID THAT SHE RECEIVED HER LAB RESULTS IN [facility] AND HAD SOME QUESTIONS. SHE ALSO SAID THAT IT DID NOT SHOW IF HER A1C WAS TESTED.

## 2021-07-12 ENCOUNTER — OFFICE VISIT (OUTPATIENT)
Dept: FAMILY MEDICINE CLINIC | Facility: CLINIC | Age: 56
End: 2021-07-12

## 2021-07-12 VITALS
BODY MASS INDEX: 39.57 KG/M2 | HEART RATE: 72 BPM | WEIGHT: 231.8 LBS | SYSTOLIC BLOOD PRESSURE: 138 MMHG | HEIGHT: 64 IN | OXYGEN SATURATION: 98 % | DIASTOLIC BLOOD PRESSURE: 80 MMHG

## 2021-07-12 DIAGNOSIS — I10 ESSENTIAL HYPERTENSION: ICD-10-CM

## 2021-07-12 DIAGNOSIS — E66.9 CLASS 2 OBESITY WITHOUT SERIOUS COMORBIDITY WITH BODY MASS INDEX (BMI) OF 39.0 TO 39.9 IN ADULT, UNSPECIFIED OBESITY TYPE: ICD-10-CM

## 2021-07-12 DIAGNOSIS — E78.2 MULTIPLE-TYPE HYPERLIPIDEMIA: ICD-10-CM

## 2021-07-12 DIAGNOSIS — F41.9 ANXIETY: ICD-10-CM

## 2021-07-12 DIAGNOSIS — E11.65 TYPE 2 DIABETES MELLITUS WITH HYPERGLYCEMIA, WITHOUT LONG-TERM CURRENT USE OF INSULIN (HCC): Primary | ICD-10-CM

## 2021-07-12 PROCEDURE — 99214 OFFICE O/P EST MOD 30 MIN: CPT | Performed by: NURSE PRACTITIONER

## 2021-07-12 RX ORDER — ORAL SEMAGLUTIDE 7 MG/1
1 TABLET ORAL
Qty: 30 TABLET | Refills: 5 | Status: SHIPPED | OUTPATIENT
Start: 2021-07-12 | End: 2022-01-12 | Stop reason: SDUPTHER

## 2021-07-12 NOTE — PROGRESS NOTES
Chief Complaint  Hypertension, Hyperlipidemia, Follow-up (3 mo), and Results (21)    Subjective          Gena Harper presents to [facility] for   History of Present Illness     Diabetes mellitus type II, pt could not haylie metformin IR or XR due to diarrhea, switch to Rybelsus and tolerating but does report intermittent constipation.  She is also made significant diet and lifestyle modifications and has lost approximately 20 pounds.  Denies any signs/symptoms of hyper/hypoglycemia, blurry vision, polydipsia, polyuria, nocturia, and unintentional weight loss.     HTN, stable on meds and takes as directed, denies chest pain, headache, shortness of air, palpitations and swelling of extremities.     Hyperlipidemia, The patient denies muscle aches, constipation, diarrhea, GI upset, fatigue, chest pain/pressure, exercise intolerance, dyspnea, palpitations, syncope and pedal edema.          The following portions of the patient's history were reviewed and updated as appropriate: allergies, current medications, past family history, past medical history, past social history, past surgical history and problem list.    Past Medical History:   Diagnosis Date   • Cataract    • Delivery of     • Hypertension      Past Surgical History:   Procedure Laterality Date   • BREAST BIOPSY Right 2018    core   • CATARACT EXTRACTION Right    • MOUTH SURGERY      Top 2 molar/oral surgery     Family History   Problem Relation Age of Onset   • Heart disease Father    • Kidney disease Father    • Diabetes Maternal Grandmother    • Breast cancer Maternal Grandmother    • Breast cancer Sister 44         at 48     Social History     Tobacco Use   • Smoking status: Never Smoker   • Smokeless tobacco: Never Used   Substance Use Topics   • Alcohol use: Yes     Alcohol/week: 3.0 standard drinks     Types: 3 Glasses of wine per week       Current Outpatient Medications:   •  atorvastatin  "(LIPITOR) 20 MG tablet, TAKE 1 TABLET BY MOUTH DAILY, Disp: 90 tablet, Rfl: 0  •  glucose blood (FREESTYLE TEST STRIPS) test strip, Use to check blood glucose daily and as needed DX: E11.65, Disp: 100 each, Rfl: 11  •  glucose monitoring kit (FREESTYLE) monitoring kit, Please provide glucometer on patient's formulary to check blood glucose daily and as needed. DX: E11.65, Disp: 1 each, Rfl: 0  •  Lancets (freestyle) lancets, Use to check blood glucose daily and as needed DX: E11.65, Disp: 100 each, Rfl: 11  •  lisinopril (PRINIVIL,ZESTRIL) 20 MG tablet, TAKE 1 TABLET BY MOUTH DAILY, Disp: 90 tablet, Rfl: 0  •  PARoxetine (PAXIL) 10 MG tablet, TAKE 1 TABLET BY MOUTH DAILY, Disp: 90 tablet, Rfl: 0  •  Semaglutide (Rybelsus) 7 MG tablet, Take 7 mg by mouth Every Morning Before Breakfast., Disp: 30 tablet, Rfl: 5  •  ibuprofen (ADVIL,MOTRIN) 800 MG tablet, Take 1 tablet by mouth Every 8 (Eight) Hours As Needed for Moderate Pain ., Disp: 90 tablet, Rfl: 1    Objective   Vital Signs:   /80 (BP Location: Left arm, Patient Position: Sitting, Cuff Size: Adult)   Pulse 72   Ht 163.8 cm (64.49\")   Wt 105 kg (231 lb 12.8 oz)   SpO2 98%   BMI 39.19 kg/m²       Physical Exam  Vitals and nursing note reviewed.   Constitutional:       General: She is not in acute distress.     Appearance: She is well-developed. She is obese. She is not diaphoretic.   Eyes:      Pupils: Pupils are equal, round, and reactive to light.   Neck:      Thyroid: No thyromegaly.      Vascular: No JVD.   Cardiovascular:      Rate and Rhythm: Normal rate and regular rhythm.      Heart sounds: Normal heart sounds. No murmur heard.     Pulmonary:      Effort: Pulmonary effort is normal. No respiratory distress.      Breath sounds: Normal breath sounds.   Abdominal:      General: Bowel sounds are normal. There is no distension.      Palpations: Abdomen is soft.      Tenderness: There is no abdominal tenderness.   Musculoskeletal:         General: No " tenderness. Normal range of motion.      Cervical back: Normal range of motion and neck supple.   Skin:     General: Skin is warm and dry.   Neurological:      Mental Status: She is alert and oriented to person, place, and time.      Sensory: No sensory deficit.   Psychiatric:         Behavior: Behavior normal.         Thought Content: Thought content normal.         Judgment: Judgment normal.          Result Review :     Lab on 07/06/2021   Component Date Value Ref Range Status   • WBC 07/06/2021 7.02  3.40 - 10.80 10*3/mm3 Final   • RBC 07/06/2021 4.90  3.77 - 5.28 10*6/mm3 Final   • Hemoglobin 07/06/2021 12.7  12.0 - 15.9 g/dL Final   • Hematocrit 07/06/2021 39.7  34.0 - 46.6 % Final   • MCV 07/06/2021 81.0  79.0 - 97.0 fL Final   • MCH 07/06/2021 25.9* 26.6 - 33.0 pg Final   • MCHC 07/06/2021 32.0  31.5 - 35.7 g/dL Final   • RDW 07/06/2021 14.4  12.3 - 15.4 % Final   • RDW-SD 07/06/2021 42.4  37.0 - 54.0 fl Final   • MPV 07/06/2021 11.8  6.0 - 12.0 fL Final   • Platelets 07/06/2021 216  140 - 450 10*3/mm3 Final   • Glucose 07/06/2021 97  65 - 99 mg/dL Final   • BUN 07/06/2021 14  6 - 20 mg/dL Final   • Creatinine 07/06/2021 0.84  0.57 - 1.00 mg/dL Final   • Sodium 07/06/2021 139  136 - 145 mmol/L Final   • Potassium 07/06/2021 4.0  3.5 - 5.2 mmol/L Final   • Chloride 07/06/2021 103  98 - 107 mmol/L Final   • CO2 07/06/2021 24.1  22.0 - 29.0 mmol/L Final   • Calcium 07/06/2021 9.4  8.6 - 10.5 mg/dL Final   • Total Protein 07/06/2021 7.3  6.0 - 8.5 g/dL Final   • Albumin 07/06/2021 4.30  3.50 - 5.20 g/dL Final   • ALT (SGPT) 07/06/2021 18  1 - 33 U/L Final   • AST (SGOT) 07/06/2021 15  1 - 32 U/L Final   • Alkaline Phosphatase 07/06/2021 91  39 - 117 U/L Final   • Total Bilirubin 07/06/2021 0.4  0.0 - 1.2 mg/dL Final   • eGFR Non  Amer 07/06/2021 71  >60 mL/min/1.73 Final   • Globulin 07/06/2021 3.0  gm/dL Final   • A/G Ratio 07/06/2021 1.4  g/dL Final   • BUN/Creatinine Ratio 07/06/2021 16.7  7.0 - 25.0  Final   • Anion Gap 07/06/2021 11.9  5.0 - 15.0 mmol/L Final   • Hemoglobin A1C 07/06/2021 6.0* 3.5 - 5.6 % Final   • Total Cholesterol 07/06/2021 166  0 - 200 mg/dL Final   • Triglycerides 07/06/2021 188* 0 - 150 mg/dL Final   • HDL Cholesterol 07/06/2021 48  40 - 60 mg/dL Final   • LDL Cholesterol  07/06/2021 86  0 - 100 mg/dL Final   • VLDL Cholesterol 07/06/2021 32  5 - 40 mg/dL Final   • LDL/HDL Ratio 07/06/2021 1.68   Final   • TSH 07/06/2021 1.430  0.270 - 4.200 uIU/mL Final                       Assessment and Plan    Diagnoses and all orders for this visit:    1. Type 2 diabetes mellitus with hyperglycemia, without long-term current use of insulin (CMS/[facility]) (Primary)  Comments:  A1c impr at 6.0,metformin IR and XR DC'd due to intolerance, tolerating Rybelsus, cont/rf.  Praised efforts of weight loss. cont DM diet and exercise.   Orders:  -     Semaglutide (Rybelsus) 7 MG tablet; Take 7 mg by mouth Every Morning Before Breakfast.  Dispense: 30 tablet; Refill: 5    2. Essential hypertension  Comments:  BP improved, continue lisinopril, praised efforts of weight loss and healthier lifestyle, if BP improves over time may be able to wean    3. Anxiety  Comments:  Stable on paroxetine, continue and refill when needed    4. Class 2 obesity without serious comorbidity with body mass index (BMI) of 39.0 to 39.9 in adult, unspecified obesity type    5. Multiple-type hyperlipidemia  Comments:  Reviewed labs and all essentially within normal limits, continue statin, refill when needed      Labs overall improved  mammo due January  cologuard due 2023    I spent 30 minutes caring for Gena Harper on this date of service. This time includes time spent by me in the following activities: preparing for the visit, reviewing tests, performing a medically appropriate examination and/or evaluation , counseling and educating the patient/family/caregiver, ordering medications, tests, or procedures and documenting information  in the medical record        Follow Up     Return in about 6 months (around 1/12/2022) for HTN, DM, DM panel, urine micro, hep c ab 1 week prior to appt.  Patient was given instructions and counseling regarding her condition or for health maintenance advice. Please see specific information pulled into the AVS if appropriate.      EMR Dragon transcription disclaimer:  Some of this encounter note is an electronic transcription translation of spoken language to printed text. The electronic translation of spoken language may permit erroneous, or at times, nonsensical words or phrases to be inadvertently transcribed; Although I have reviewed the note for such errors some may still exist.

## 2021-09-20 RX ORDER — ATORVASTATIN CALCIUM 20 MG/1
20 TABLET, FILM COATED ORAL DAILY
Qty: 90 TABLET | Refills: 0 | Status: SHIPPED | OUTPATIENT
Start: 2021-09-20 | End: 2021-12-20

## 2021-09-20 RX ORDER — LISINOPRIL 20 MG/1
20 TABLET ORAL DAILY
Qty: 90 TABLET | Refills: 0 | Status: SHIPPED | OUTPATIENT
Start: 2021-09-20 | End: 2021-12-20

## 2021-09-20 RX ORDER — PAROXETINE 10 MG/1
10 TABLET, FILM COATED ORAL DAILY
Qty: 90 TABLET | Refills: 0 | Status: SHIPPED | OUTPATIENT
Start: 2021-09-20 | End: 2021-12-20

## 2021-12-20 RX ORDER — LISINOPRIL 20 MG/1
20 TABLET ORAL DAILY
Qty: 90 TABLET | Refills: 0 | Status: SHIPPED | OUTPATIENT
Start: 2021-12-20 | End: 2022-01-12 | Stop reason: SDUPTHER

## 2021-12-20 RX ORDER — ATORVASTATIN CALCIUM 20 MG/1
20 TABLET, FILM COATED ORAL DAILY
Qty: 90 TABLET | Refills: 0 | Status: SHIPPED | OUTPATIENT
Start: 2021-12-20 | End: 2022-01-12 | Stop reason: SDUPTHER

## 2021-12-20 RX ORDER — PAROXETINE 10 MG/1
10 TABLET, FILM COATED ORAL DAILY
Qty: 90 TABLET | Refills: 0 | Status: SHIPPED | OUTPATIENT
Start: 2021-12-20 | End: 2022-01-12 | Stop reason: SDUPTHER

## 2022-01-05 ENCOUNTER — LAB (OUTPATIENT)
Dept: FAMILY MEDICINE CLINIC | Facility: CLINIC | Age: 57
End: 2022-01-05

## 2022-01-05 DIAGNOSIS — E78.2 MULTIPLE-TYPE HYPERLIPIDEMIA: ICD-10-CM

## 2022-01-05 DIAGNOSIS — Z11.59 NEED FOR HEPATITIS C SCREENING TEST: ICD-10-CM

## 2022-01-05 DIAGNOSIS — I10 PRIMARY HYPERTENSION: Primary | ICD-10-CM

## 2022-01-05 DIAGNOSIS — R73.09 ABNORMAL GLUCOSE LEVEL: ICD-10-CM

## 2022-01-05 PROCEDURE — 80061 LIPID PANEL: CPT | Performed by: NURSE PRACTITIONER

## 2022-01-05 PROCEDURE — 83036 HEMOGLOBIN GLYCOSYLATED A1C: CPT | Performed by: NURSE PRACTITIONER

## 2022-01-05 PROCEDURE — 86803 HEPATITIS C AB TEST: CPT | Performed by: NURSE PRACTITIONER

## 2022-01-05 PROCEDURE — 85027 COMPLETE CBC AUTOMATED: CPT | Performed by: NURSE PRACTITIONER

## 2022-01-05 PROCEDURE — 36415 COLL VENOUS BLD VENIPUNCTURE: CPT | Performed by: NURSE PRACTITIONER

## 2022-01-05 PROCEDURE — 80053 COMPREHEN METABOLIC PANEL: CPT | Performed by: NURSE PRACTITIONER

## 2022-01-06 LAB
ALBUMIN SERPL-MCNC: 4.2 G/DL (ref 3.5–5.2)
ALBUMIN/GLOB SERPL: 1.4 G/DL
ALP SERPL-CCNC: 92 U/L (ref 39–117)
ALT SERPL W P-5'-P-CCNC: 14 U/L (ref 1–33)
ANION GAP SERPL CALCULATED.3IONS-SCNC: 11.7 MMOL/L (ref 5–15)
AST SERPL-CCNC: 11 U/L (ref 1–32)
BILIRUB SERPL-MCNC: 0.3 MG/DL (ref 0–1.2)
BUN SERPL-MCNC: 15 MG/DL (ref 6–20)
BUN/CREAT SERPL: 18.1 (ref 7–25)
CALCIUM SPEC-SCNC: 9.5 MG/DL (ref 8.6–10.5)
CHLORIDE SERPL-SCNC: 102 MMOL/L (ref 98–107)
CHOLEST SERPL-MCNC: 168 MG/DL (ref 0–200)
CO2 SERPL-SCNC: 27.3 MMOL/L (ref 22–29)
CREAT SERPL-MCNC: 0.83 MG/DL (ref 0.57–1)
DEPRECATED RDW RBC AUTO: 39.4 FL (ref 37–54)
ERYTHROCYTE [DISTWIDTH] IN BLOOD BY AUTOMATED COUNT: 14.1 % (ref 12.3–15.4)
GFR SERPL CREATININE-BSD FRML MDRD: 71 ML/MIN/1.73
GLOBULIN UR ELPH-MCNC: 2.9 GM/DL
GLUCOSE SERPL-MCNC: 82 MG/DL (ref 65–99)
HBA1C MFR BLD: 5.7 % (ref 3.5–5.6)
HCT VFR BLD AUTO: 39.3 % (ref 34–46.6)
HCV AB SER DONR QL: NORMAL
HDLC SERPL-MCNC: 52 MG/DL (ref 40–60)
HGB BLD-MCNC: 12.8 G/DL (ref 12–15.9)
LDLC SERPL CALC-MCNC: 85 MG/DL (ref 0–100)
LDLC/HDLC SERPL: 1.52 {RATIO}
MCH RBC QN AUTO: 25.7 PG (ref 26.6–33)
MCHC RBC AUTO-ENTMCNC: 32.6 G/DL (ref 31.5–35.7)
MCV RBC AUTO: 78.8 FL (ref 79–97)
PLATELET # BLD AUTO: 257 10*3/MM3 (ref 140–450)
PMV BLD AUTO: 11.2 FL (ref 6–12)
POTASSIUM SERPL-SCNC: 4.6 MMOL/L (ref 3.5–5.2)
PROT SERPL-MCNC: 7.1 G/DL (ref 6–8.5)
RBC # BLD AUTO: 4.99 10*6/MM3 (ref 3.77–5.28)
SODIUM SERPL-SCNC: 141 MMOL/L (ref 136–145)
TRIGL SERPL-MCNC: 185 MG/DL (ref 0–150)
VLDLC SERPL-MCNC: 31 MG/DL (ref 5–40)
WBC NRBC COR # BLD: 8.89 10*3/MM3 (ref 3.4–10.8)

## 2022-01-12 ENCOUNTER — OFFICE VISIT (OUTPATIENT)
Dept: FAMILY MEDICINE CLINIC | Facility: CLINIC | Age: 57
End: 2022-01-12

## 2022-01-12 VITALS
WEIGHT: 218.6 LBS | OXYGEN SATURATION: 100 % | BODY MASS INDEX: 37.32 KG/M2 | DIASTOLIC BLOOD PRESSURE: 84 MMHG | SYSTOLIC BLOOD PRESSURE: 138 MMHG | HEIGHT: 64 IN | HEART RATE: 60 BPM

## 2022-01-12 DIAGNOSIS — I10 PRIMARY HYPERTENSION: ICD-10-CM

## 2022-01-12 DIAGNOSIS — E11.65 TYPE 2 DIABETES MELLITUS WITH HYPERGLYCEMIA, WITHOUT LONG-TERM CURRENT USE OF INSULIN: Primary | ICD-10-CM

## 2022-01-12 DIAGNOSIS — F41.9 ANXIETY: ICD-10-CM

## 2022-01-12 DIAGNOSIS — E78.2 MULTIPLE-TYPE HYPERLIPIDEMIA: ICD-10-CM

## 2022-01-12 DIAGNOSIS — Z00.00 PREVENTATIVE HEALTH CARE: ICD-10-CM

## 2022-01-12 DIAGNOSIS — Z12.31 BREAST CANCER SCREENING BY MAMMOGRAM: ICD-10-CM

## 2022-01-12 PROCEDURE — 99214 OFFICE O/P EST MOD 30 MIN: CPT | Performed by: NURSE PRACTITIONER

## 2022-01-12 PROCEDURE — 90732 PPSV23 VACC 2 YRS+ SUBQ/IM: CPT | Performed by: NURSE PRACTITIONER

## 2022-01-12 PROCEDURE — 90471 IMMUNIZATION ADMIN: CPT | Performed by: NURSE PRACTITIONER

## 2022-01-12 PROCEDURE — 82043 UR ALBUMIN QUANTITATIVE: CPT | Performed by: NURSE PRACTITIONER

## 2022-01-12 RX ORDER — ATORVASTATIN CALCIUM 20 MG/1
20 TABLET, FILM COATED ORAL DAILY
Qty: 90 TABLET | Refills: 1 | Status: SHIPPED | OUTPATIENT
Start: 2022-01-12 | End: 2022-07-12 | Stop reason: SDUPTHER

## 2022-01-12 RX ORDER — LISINOPRIL 20 MG/1
20 TABLET ORAL DAILY
Qty: 90 TABLET | Refills: 1 | Status: SHIPPED | OUTPATIENT
Start: 2022-01-12 | End: 2022-07-12 | Stop reason: SDUPTHER

## 2022-01-12 RX ORDER — PAROXETINE 10 MG/1
10 TABLET, FILM COATED ORAL DAILY
Qty: 90 TABLET | Refills: 1 | Status: SHIPPED | OUTPATIENT
Start: 2022-01-12 | End: 2022-07-12 | Stop reason: SDUPTHER

## 2022-01-12 RX ORDER — ORAL SEMAGLUTIDE 7 MG/1
1 TABLET ORAL
Qty: 90 TABLET | Refills: 1 | Status: SHIPPED | OUTPATIENT
Start: 2022-01-12 | End: 2022-01-17

## 2022-01-12 NOTE — PROGRESS NOTES
Injection  Injection performed in right deltoid by Fatoumata Chase MA. Patient tolerated the procedure well without complications.  01/12/22   Fatoumata Chase MA

## 2022-01-12 NOTE — PROGRESS NOTES
Chief Complaint  Hypertension, Diabetes, Follow-up, and Results (22 labs)    Subjective          Gena Harper presents to [facility] for   History of Present Illness       HTN, stable on meds and takes as directed, denies chest pain, headache, shortness of air, palpitations and swelling of extremities.     Diabetes mellitus type II, feels stable on meds, denies any signs/symptoms of hyper/hypoglycemia, blurry vision, polydipsia, polyuria, nocturia, and unintentional weight loss    She reports having a knot on top of L foot,  That is sometimes uncomfortable and rubs in certain shoes.       The following portions of the patient's history were reviewed and updated as appropriate: allergies, current medications, past family history, past medical history, past social history, past surgical history and problem list.    Past Medical History:   Diagnosis Date   • Cataract    • Delivery of     • Hypertension      Past Surgical History:   Procedure Laterality Date   • BREAST BIOPSY Right 2018    core   • CATARACT EXTRACTION Right    • MOUTH SURGERY      Top 2 molar/oral surgery     Family History   Problem Relation Age of Onset   • Heart disease Father    • Kidney disease Father    • Diabetes Maternal Grandmother    • Breast cancer Maternal Grandmother    • Breast cancer Sister 44         at 48     Social History     Tobacco Use   • Smoking status: Never Smoker   • Smokeless tobacco: Never Used   Substance Use Topics   • Alcohol use: Yes     Alcohol/week: 3.0 standard drinks     Types: 3 Glasses of wine per week       Current Outpatient Medications:   •  atorvastatin (LIPITOR) 20 MG tablet, Take 1 tablet by mouth Daily., Disp: 90 tablet, Rfl: 1  •  glucose blood (FREESTYLE TEST STRIPS) test strip, Use to check blood glucose daily and as needed DX: E11.65, Disp: 100 each, Rfl: 11  •  glucose monitoring kit (FREESTYLE) monitoring kit, Please provide glucometer on  "patient's formulary to check blood glucose daily and as needed. DX: E11.65, Disp: 1 each, Rfl: 0  •  ibuprofen (ADVIL,MOTRIN) 800 MG tablet, Take 1 tablet by mouth Every 8 (Eight) Hours As Needed for Moderate Pain ., Disp: 90 tablet, Rfl: 1  •  Lancets (freestyle) lancets, Use to check blood glucose daily and as needed DX: E11.65, Disp: 100 each, Rfl: 11  •  lisinopril (PRINIVIL,ZESTRIL) 20 MG tablet, Take 1 tablet by mouth Daily., Disp: 90 tablet, Rfl: 1  •  PARoxetine (PAXIL) 10 MG tablet, Take 1 tablet by mouth Daily., Disp: 90 tablet, Rfl: 1  •  Semaglutide (Rybelsus) 7 MG tablet, Take 7 mg by mouth Every Morning Before Breakfast., Disp: 90 tablet, Rfl: 1    Objective   Vital Signs:   /84 (BP Location: Left arm, Patient Position: Sitting, Cuff Size: Large Adult)   Pulse 60   Ht 163.8 cm (64.49\")   Wt 99.2 kg (218 lb 9.6 oz)   SpO2 100%   BMI 36.96 kg/m²       Physical Exam  Vitals and nursing note reviewed.   Constitutional:       General: She is not in acute distress.     Appearance: She is well-developed. She is obese. She is not diaphoretic.   Eyes:      Pupils: Pupils are equal, round, and reactive to light.   Neck:      Thyroid: No thyromegaly.      Vascular: No JVD.   Cardiovascular:      Rate and Rhythm: Normal rate and regular rhythm.      Heart sounds: Normal heart sounds. No murmur heard.      Pulmonary:      Effort: Pulmonary effort is normal. No respiratory distress.      Breath sounds: Normal breath sounds.   Abdominal:      General: Bowel sounds are normal. There is no distension.      Palpations: Abdomen is soft.      Tenderness: There is no abdominal tenderness.   Musculoskeletal:         General: Deformity (high plantar arches, L bony deformity of the dorsal foot. nontender to palpation) present. No tenderness. Normal range of motion.      Cervical back: Normal range of motion and neck supple.   Skin:     General: Skin is warm and dry.   Neurological:      Mental Status: She is alert " and oriented to person, place, and time.      Sensory: No sensory deficit.   Psychiatric:         Behavior: Behavior normal.         Thought Content: Thought content normal.         Judgment: Judgment normal.          Result Review :     Office Visit on 01/12/2022   Component Date Value Ref Range Status   • Microalbumin, Urine 01/12/2022 <1.2  mg/dL Final                       Assessment and Plan    Diagnoses and all orders for this visit:    1. Type 2 diabetes mellitus with hyperglycemia, without long-term current use of insulin (HCC) (Primary)  Comments:  A1c stable, 5.7, cont/rf Rybelsus, cont/rf.  Praised efforts of weight loss. cont DM diet and exercise.   Orders:  -     MicroAlbumin, Urine, Random - Urine, Clean Catch  -     Semaglutide (Rybelsus) 7 MG tablet; Take 7 mg by mouth Every Morning Before Breakfast.  Dispense: 90 tablet; Refill: 1    2. Preventative health care  -     Pneumococcal Polysaccharide Vaccine 23-Valent Greater Than or Equal To 3yo Subcutaneous / IM    3. Primary hypertension  -     lisinopril (PRINIVIL,ZESTRIL) 20 MG tablet; Take 1 tablet by mouth Daily.  Dispense: 90 tablet; Refill: 1    4. Anxiety  -     PARoxetine (PAXIL) 10 MG tablet; Take 1 tablet by mouth Daily.  Dispense: 90 tablet; Refill: 1    5. Breast cancer screening by mammogram  -     Mammo Screening Digital Tomosynthesis Bilateral With CAD; Future    6. Multiple-type hyperlipidemia  -     atorvastatin (LIPITOR) 20 MG tablet; Take 1 tablet by mouth Daily.  Dispense: 90 tablet; Refill: 1    Conditions stable, rf meds as above  Labs reviewed and stable        I spent 30 minutes caring for Gena Harper on this date of service. This time includes time spent by me in the following activities: preparing for the visit, reviewing tests, performing a medically appropriate examination and/or evaluation , counseling and educating the patient/family/caregiver, ordering medications, tests, or procedures and documenting information in  the medical record        Follow Up     Return in about 6 months (around 7/12/2022) for DM panel prior to visit. , Recheck.  Patient was given instructions and counseling regarding her condition or for health maintenance advice. Please see specific information pulled into the AVS if appropriate.      EMR Dragon transcription disclaimer:  Some of this encounter note is an electronic transcription translation of spoken language to printed text. The electronic translation of spoken language may permit erroneous, or at times, nonsensical words or phrases to be inadvertently transcribed; Although I have reviewed the note for such errors some may still exist.

## 2022-01-13 LAB — ALBUMIN UR-MCNC: <1.2 MG/DL

## 2022-01-17 DIAGNOSIS — E11.65 TYPE 2 DIABETES MELLITUS WITH HYPERGLYCEMIA, WITHOUT LONG-TERM CURRENT USE OF INSULIN: ICD-10-CM

## 2022-01-17 RX ORDER — ORAL SEMAGLUTIDE 7 MG/1
TABLET ORAL
Qty: 30 TABLET | Refills: 5 | Status: SHIPPED | OUTPATIENT
Start: 2022-01-17 | End: 2022-07-12 | Stop reason: SDUPTHER

## 2022-02-03 ENCOUNTER — APPOINTMENT (OUTPATIENT)
Dept: MAMMOGRAPHY | Facility: HOSPITAL | Age: 57
End: 2022-02-03

## 2022-02-17 ENCOUNTER — HOSPITAL ENCOUNTER (OUTPATIENT)
Dept: MAMMOGRAPHY | Facility: HOSPITAL | Age: 57
Discharge: HOME | End: 2022-02-17
Admitting: NURSE PRACTITIONER

## 2022-02-17 DIAGNOSIS — Z12.31 BREAST CANCER SCREENING BY MAMMOGRAM: ICD-10-CM

## 2022-02-17 PROCEDURE — 77067 SCR MAMMO BI INCL CAD: CPT

## 2022-02-17 PROCEDURE — 77063 BREAST TOMOSYNTHESIS BI: CPT

## 2022-04-21 ENCOUNTER — TELEPHONE (OUTPATIENT)
Dept: FAMILY MEDICINE CLINIC | Facility: CLINIC | Age: 57
End: 2022-04-21

## 2022-04-21 NOTE — TELEPHONE ENCOUNTER
Caller: Gena Harper    Relationship to patient: Self    Best call back number: [phone]    Date of exposure: UNKNOWN    Date of positive COVID19 test: THIS MORNING- HOME COVID TEST      COVID19 symptoms: NASAL CONGESTION, FATIGUE, HASN'T LOST TASTE OR SMELL AND NO FEVER OR HEADACHE    Date of initial quarantine: 4/21    Additional information or concerns: PLEASE CONTACT PATIENT FOR CARE INSTRUCTIONS TO GET OVER AS SOON AS POSSIBLE    What is the patients preferred pharmacy: [id number]

## 2022-04-21 NOTE — TELEPHONE ENCOUNTER
No treatment recommended at this time, may need antibiotic and/or steroid if develops cough, wheezing, shortness of air.  Please check with patient tomorrow on her symptoms and let me know.  Thank you

## 2022-04-22 RX ORDER — AZITHROMYCIN 250 MG/1
TABLET, FILM COATED ORAL
Qty: 6 TABLET | Refills: 0 | Status: SHIPPED | OUTPATIENT
Start: 2022-04-22 | End: 2022-07-12

## 2022-04-22 RX ORDER — METHYLPREDNISOLONE 4 MG/1
TABLET ORAL
Qty: 21 TABLET | Refills: 0 | Status: SHIPPED | OUTPATIENT
Start: 2022-04-22 | End: 2022-07-12

## 2022-04-22 NOTE — TELEPHONE ENCOUNTER
I sent zpack and medrol dose pack to start if s/s worsen over the weekend. Otherwise rec dayquil/nyquil or mucinex DM prn for cough/congestion, tylenol/ibuprofen prn for aches/pain/fever.

## 2022-04-22 NOTE — TELEPHONE ENCOUNTER
Pt reports that the cough has slightly worsened from yesterday and a bit of mucus (did not pay attention to color).  Denied wheezing and soa.

## 2022-06-29 ENCOUNTER — TELEPHONE (OUTPATIENT)
Dept: FAMILY MEDICINE CLINIC | Facility: CLINIC | Age: 57
End: 2022-06-29

## 2022-07-07 ENCOUNTER — CLINICAL SUPPORT (OUTPATIENT)
Dept: FAMILY MEDICINE CLINIC | Facility: CLINIC | Age: 57
End: 2022-07-07

## 2022-07-07 DIAGNOSIS — I10 PRIMARY HYPERTENSION: ICD-10-CM

## 2022-07-07 DIAGNOSIS — E11.65 TYPE 2 DIABETES MELLITUS WITH HYPERGLYCEMIA, WITHOUT LONG-TERM CURRENT USE OF INSULIN: Primary | ICD-10-CM

## 2022-07-07 LAB — HBA1C MFR BLD: 6.1 % (ref 3.5–5.6)

## 2022-07-07 PROCEDURE — 83036 HEMOGLOBIN GLYCOSYLATED A1C: CPT | Performed by: NURSE PRACTITIONER

## 2022-07-07 PROCEDURE — 80061 LIPID PANEL: CPT | Performed by: NURSE PRACTITIONER

## 2022-07-07 PROCEDURE — 80050 GENERAL HEALTH PANEL: CPT | Performed by: NURSE PRACTITIONER

## 2022-07-07 PROCEDURE — 36415 COLL VENOUS BLD VENIPUNCTURE: CPT | Performed by: NURSE PRACTITIONER

## 2022-07-07 NOTE — PROGRESS NOTES
Venipuncture Blood Specimen Collection  Venipuncture performed in rt arm by Fatoumata Chase MA with good hemostasis. Patient tolerated the procedure well without complications.   07/07/22   Fatoumata Chase MA

## 2022-07-08 LAB
CHOLEST SERPL-MCNC: 199 MG/DL (ref 0–200)
DEPRECATED RDW RBC AUTO: 42.4 FL (ref 37–54)
ERYTHROCYTE [DISTWIDTH] IN BLOOD BY AUTOMATED COUNT: 14.6 % (ref 12.3–15.4)
HCT VFR BLD AUTO: 38.4 % (ref 34–46.6)
HDLC SERPL-MCNC: 58 MG/DL (ref 40–60)
HGB BLD-MCNC: 12.5 G/DL (ref 12–15.9)
LDLC SERPL CALC-MCNC: 106 MG/DL (ref 0–100)
LDLC/HDLC SERPL: 1.72 {RATIO}
MCH RBC QN AUTO: 26.2 PG (ref 26.6–33)
MCHC RBC AUTO-ENTMCNC: 32.6 G/DL (ref 31.5–35.7)
MCV RBC AUTO: 80.5 FL (ref 79–97)
PLATELET # BLD AUTO: 227 10*3/MM3 (ref 140–450)
PMV BLD AUTO: 11.5 FL (ref 6–12)
RBC # BLD AUTO: 4.77 10*6/MM3 (ref 3.77–5.28)
TRIGL SERPL-MCNC: 205 MG/DL (ref 0–150)
TSH SERPL DL<=0.05 MIU/L-ACNC: 2.03 UIU/ML (ref 0.27–4.2)
VLDLC SERPL-MCNC: 35 MG/DL (ref 5–40)
WBC NRBC COR # BLD: 7.35 10*3/MM3 (ref 3.4–10.8)

## 2022-07-11 LAB
ALBUMIN SERPL-MCNC: 4.3 G/DL (ref 3.5–5.2)
ALBUMIN/GLOB SERPL: 1.5 G/DL
ALP SERPL-CCNC: 85 U/L (ref 39–117)
ALT SERPL W P-5'-P-CCNC: 14 U/L (ref 1–33)
ANION GAP SERPL CALCULATED.3IONS-SCNC: 15 MMOL/L (ref 5–15)
AST SERPL-CCNC: 16 U/L (ref 1–32)
BILIRUB SERPL-MCNC: 0.3 MG/DL (ref 0–1.2)
BUN SERPL-MCNC: 18 MG/DL (ref 6–20)
BUN/CREAT SERPL: 25.7 (ref 7–25)
CALCIUM SPEC-SCNC: 9 MG/DL (ref 8.6–10.5)
CHLORIDE SERPL-SCNC: 103 MMOL/L (ref 98–107)
CO2 SERPL-SCNC: 22 MMOL/L (ref 22–29)
CREAT SERPL-MCNC: 0.7 MG/DL (ref 0.57–1)
EGFRCR SERPLBLD CKD-EPI 2021: 102.3 ML/MIN/1.73
GLOBULIN UR ELPH-MCNC: 2.8 GM/DL
GLUCOSE SERPL-MCNC: 86 MG/DL (ref 65–99)
POTASSIUM SERPL-SCNC: 4.6 MMOL/L (ref 3.5–5.2)
PROT SERPL-MCNC: 7.1 G/DL (ref 6–8.5)
SODIUM SERPL-SCNC: 140 MMOL/L (ref 136–145)

## 2022-07-12 ENCOUNTER — OFFICE VISIT (OUTPATIENT)
Dept: FAMILY MEDICINE CLINIC | Facility: CLINIC | Age: 57
End: 2022-07-12

## 2022-07-12 VITALS
WEIGHT: 228.2 LBS | HEART RATE: 73 BPM | OXYGEN SATURATION: 97 % | DIASTOLIC BLOOD PRESSURE: 88 MMHG | BODY MASS INDEX: 38.96 KG/M2 | SYSTOLIC BLOOD PRESSURE: 140 MMHG | HEIGHT: 64 IN

## 2022-07-12 DIAGNOSIS — E11.65 TYPE 2 DIABETES MELLITUS WITH HYPERGLYCEMIA, WITHOUT LONG-TERM CURRENT USE OF INSULIN: ICD-10-CM

## 2022-07-12 DIAGNOSIS — M79.672 LEFT FOOT PAIN: ICD-10-CM

## 2022-07-12 DIAGNOSIS — I10 PRIMARY HYPERTENSION: Primary | ICD-10-CM

## 2022-07-12 DIAGNOSIS — E78.2 MULTIPLE-TYPE HYPERLIPIDEMIA: ICD-10-CM

## 2022-07-12 DIAGNOSIS — F41.9 ANXIETY: ICD-10-CM

## 2022-07-12 PROCEDURE — 99214 OFFICE O/P EST MOD 30 MIN: CPT | Performed by: NURSE PRACTITIONER

## 2022-07-12 RX ORDER — LISINOPRIL 20 MG/1
20 TABLET ORAL DAILY
Qty: 90 TABLET | Refills: 1 | Status: SHIPPED | OUTPATIENT
Start: 2022-07-12 | End: 2023-01-12 | Stop reason: SDUPTHER

## 2022-07-12 RX ORDER — PAROXETINE 10 MG/1
10 TABLET, FILM COATED ORAL DAILY
Qty: 90 TABLET | Refills: 1 | Status: SHIPPED | OUTPATIENT
Start: 2022-07-12 | End: 2023-01-12 | Stop reason: SDUPTHER

## 2022-07-12 RX ORDER — ATORVASTATIN CALCIUM 20 MG/1
20 TABLET, FILM COATED ORAL DAILY
Qty: 90 TABLET | Refills: 1 | Status: SHIPPED | OUTPATIENT
Start: 2022-07-12 | End: 2023-01-12 | Stop reason: SDUPTHER

## 2022-07-12 RX ORDER — ORAL SEMAGLUTIDE 7 MG/1
1 TABLET ORAL
Qty: 90 TABLET | Refills: 1 | Status: SHIPPED | OUTPATIENT
Start: 2022-07-12 | End: 2023-01-03

## 2022-07-12 NOTE — PROGRESS NOTES
Chief Complaint  Chief Complaint   Patient presents with   • Diabetes   • Follow-up     6 mo           Subjective          Gena Harper presents to [facility] for   History of Present Illness     Diabetes mellitus type II, feels stable on meds, denies any signs/symptoms of hyper/hypoglycemia, blurry vision, polydipsia, polyuria, nocturia, and unintentional weight loss    HTN, stable on meds and takes as directed, denies chest pain, headache, shortness of air, palpitations and swelling of extremities.     She does report increased stress at work, has not been eating as healthy as usual lately    She continues to complain of the mid top of left foot pain, reports it as a lump on the top of the left foot, it is larger than the right, often causes pain with ambulation and certain shoes.     Here to review labs    The following portions of the patient's history were reviewed and updated as appropriate: allergies, current medications, past family history, past medical history, past social history, past surgical history and problem list.    Past Medical History:   Diagnosis Date   • Cataract    • Delivery of     • Hypertension      Past Surgical History:   Procedure Laterality Date   • BREAST BIOPSY Right 2018    core   • CATARACT EXTRACTION Right    • MOUTH SURGERY      Top 2 molar/oral surgery     Family History   Problem Relation Age of Onset   • Heart disease Father    • Kidney disease Father    • Diabetes Maternal Grandmother    • Breast cancer Maternal Grandmother    • Breast cancer Sister 44         at 48     Social History     Tobacco Use   • Smoking status: Never Smoker   • Smokeless tobacco: Never Used   Substance Use Topics   • Alcohol use: Yes     Alcohol/week: 3.0 standard drinks     Types: 3 Glasses of wine per week         Current Outpatient Medications:   •  atorvastatin (LIPITOR) 20 MG tablet, Take 1 tablet by mouth Daily., Disp: 90 tablet, Rfl: 1  •   "glucose blood (FREESTYLE TEST STRIPS) test strip, Use to check blood glucose daily and as needed DX: E11.65, Disp: 100 each, Rfl: 11  •  glucose monitoring kit (FREESTYLE) monitoring kit, Please provide glucometer on patient's formulary to check blood glucose daily and as needed. DX: E11.65, Disp: 1 each, Rfl: 0  •  ibuprofen (ADVIL,MOTRIN) 800 MG tablet, Take 1 tablet by mouth Every 8 (Eight) Hours As Needed for Moderate Pain ., Disp: 90 tablet, Rfl: 1  •  Lancets (freestyle) lancets, Use to check blood glucose daily and as needed DX: E11.65, Disp: 100 each, Rfl: 11  •  lisinopril (PRINIVIL,ZESTRIL) 20 MG tablet, Take 1 tablet by mouth Daily., Disp: 90 tablet, Rfl: 1  •  PARoxetine (PAXIL) 10 MG tablet, Take 1 tablet by mouth Daily., Disp: 90 tablet, Rfl: 1  •  Semaglutide (Rybelsus) 7 MG tablet, Take 7 mg by mouth Every Morning Before Breakfast., Disp: 90 tablet, Rfl: 1    Objective   Vital Signs:   /88 (BP Location: Left arm, Patient Position: Sitting, Cuff Size: Large Adult)   Pulse 73   Ht 163.8 cm (64.49\")   Wt 104 kg (228 lb 3.2 oz)   SpO2 97%   BMI 38.58 kg/m²           Physical Exam  Vitals and nursing note reviewed.   Constitutional:       General: She is not in acute distress.     Appearance: She is well-developed. She is not diaphoretic.   Eyes:      Pupils: Pupils are equal, round, and reactive to light.   Neck:      Thyroid: No thyromegaly.      Vascular: No JVD.   Cardiovascular:      Rate and Rhythm: Normal rate and regular rhythm.      Heart sounds: Normal heart sounds. No murmur heard.  Pulmonary:      Effort: Pulmonary effort is normal. No respiratory distress.      Breath sounds: Normal breath sounds.   Abdominal:      General: Bowel sounds are normal. There is no distension.      Palpations: Abdomen is soft.      Tenderness: There is no abdominal tenderness.   Musculoskeletal:         General: Deformity (high arch of left foot, dorsal foot also arched with mild ttp) present. No " tenderness. Normal range of motion.      Cervical back: Normal range of motion and neck supple.   Skin:     General: Skin is warm and dry.   Neurological:      Mental Status: She is alert and oriented to person, place, and time.      Sensory: No sensory deficit.   Psychiatric:         Behavior: Behavior normal.         Thought Content: Thought content normal.         Judgment: Judgment normal.          Result Review :     Clinical Support on 07/07/2022   Component Date Value Ref Range Status   • WBC 07/07/2022 7.35  3.40 - 10.80 10*3/mm3 Final   • RBC 07/07/2022 4.77  3.77 - 5.28 10*6/mm3 Final   • Hemoglobin 07/07/2022 12.5  12.0 - 15.9 g/dL Final   • Hematocrit 07/07/2022 38.4  34.0 - 46.6 % Final   • MCV 07/07/2022 80.5  79.0 - 97.0 fL Final   • MCH 07/07/2022 26.2 (A) 26.6 - 33.0 pg Final   • MCHC 07/07/2022 32.6  31.5 - 35.7 g/dL Final   • RDW 07/07/2022 14.6  12.3 - 15.4 % Final   • RDW-SD 07/07/2022 42.4  37.0 - 54.0 fl Final   • MPV 07/07/2022 11.5  6.0 - 12.0 fL Final   • Platelets 07/07/2022 227  140 - 450 10*3/mm3 Final   • Glucose 07/07/2022 86  65 - 99 mg/dL Final   • BUN 07/07/2022 18  6 - 20 mg/dL Final   • Creatinine 07/07/2022 0.70  0.57 - 1.00 mg/dL Final   • Sodium 07/07/2022 140  136 - 145 mmol/L Final   • Potassium 07/07/2022 4.6  3.5 - 5.2 mmol/L Final   • Chloride 07/07/2022 103  98 - 107 mmol/L Final   • CO2 07/07/2022 22.0  22.0 - 29.0 mmol/L Final   • Calcium 07/07/2022 9.0  8.6 - 10.5 mg/dL Final   • Total Protein 07/07/2022 7.1  6.0 - 8.5 g/dL Final   • Albumin 07/07/2022 4.30  3.50 - 5.20 g/dL Final   • ALT (SGPT) 07/07/2022 14  1 - 33 U/L Final   • AST (SGOT) 07/07/2022 16  1 - 32 U/L Final   • Alkaline Phosphatase 07/07/2022 85  39 - 117 U/L Final   • Total Bilirubin 07/07/2022 0.3  0.0 - 1.2 mg/dL Final   • Globulin 07/07/2022 2.8  gm/dL Corrected   • A/G Ratio 07/07/2022 1.5  g/dL Corrected   • BUN/Creatinine Ratio 07/07/2022 25.7 (A) 7.0 - 25.0 Final   • Anion Gap 07/07/2022 15.0   5.0 - 15.0 mmol/L Corrected   • eGFR 07/07/2022 102.3  >60.0 mL/min/1.73 Final    National Kidney Foundation and American Society of Nephrology (ASN) Task Force recommended calculation based on the Chronic Kidney Disease Epidemiology Collaboration (CKD-EPI) equation refit without adjustment for race.   • Hemoglobin A1C 07/07/2022 6.1 (A) 3.5 - 5.6 % Final   • Total Cholesterol 07/07/2022 199  0 - 200 mg/dL Final   • Triglycerides 07/07/2022 205 (A) 0 - 150 mg/dL Final   • HDL Cholesterol 07/07/2022 58  40 - 60 mg/dL Final   • LDL Cholesterol  07/07/2022 106 (A) 0 - 100 mg/dL Final    Unable to calculate   • VLDL Cholesterol 07/07/2022 35  5 - 40 mg/dL Final   • LDL/HDL Ratio 07/07/2022 1.72   Final    Unable to calculate   • TSH 07/07/2022 2.030  0.270 - 4.200 uIU/mL Final                  Class 2 Severe Obesity (BMI >=35 and <=39.9). Obesity-related health conditions include the following: hypertension, diabetes mellitus and dyslipidemias. Obesity is unchanged. BMI is is above average; BMI management plan is completed. We discussed portion control and increasing exercise.           Assessment and Plan    Diagnoses and all orders for this visit:    1. Primary hypertension (Primary)  Comments:  BP stable and lower at home, continue lisinopril  Orders:  -     lisinopril (PRINIVIL,ZESTRIL) 20 MG tablet; Take 1 tablet by mouth Daily.  Dispense: 90 tablet; Refill: 1    2. Anxiety  Comments:  Stable, continue Paxil and refill when needed  Orders:  -     PARoxetine (PAXIL) 10 MG tablet; Take 1 tablet by mouth Daily.  Dispense: 90 tablet; Refill: 1    3. Type 2 diabetes mellitus with hyperglycemia, without long-term current use of insulin (HCC)  Comments:  A1c up some but remains stable at 6.1, cont/rf Rybelsus, cont DM diet and exercise.   Orders:  -     Semaglutide (Rybelsus) 7 MG tablet; Take 7 mg by mouth Every Morning Before Breakfast.  Dispense: 90 tablet; Refill: 1    4. Multiple-type  hyperlipidemia  Comments:  Lipids slightly elevated from previous, continue atorvastatin, continue healthy heart and diabetic diet, work on increasing exercise habits  Orders:  -     atorvastatin (LIPITOR) 20 MG tablet; Take 1 tablet by mouth Daily.  Dispense: 90 tablet; Refill: 1    5. Left foot pain  Comments:  Consider referral to podiatry      Age appropriate preventative counseling provided, including healthy lifestyle modifications and exercise    I spent 30 minutes caring for Gena Harper on this date of service. This time includes time spent by me in the following activities: preparing for the visit, reviewing tests, performing a medically appropriate examination and/or evaluation , counseling and educating the patient/family/caregiver, ordering medications, tests, or procedures and documenting information in the medical record        Follow Up     Return in about 6 months (around 1/12/2023) for DM panel prior to appt, Annual physical.  Patient was given instructions and counseling regarding her condition or for health maintenance advice. Please see specific information pulled into the AVS if appropriate.        Part of this note may be an electronic transcription/translation of spoken language to printed text using the Dragon Dictation System

## 2023-01-02 DIAGNOSIS — E11.65 TYPE 2 DIABETES MELLITUS WITH HYPERGLYCEMIA, WITHOUT LONG-TERM CURRENT USE OF INSULIN: ICD-10-CM

## 2023-01-03 RX ORDER — ORAL SEMAGLUTIDE 7 MG/1
TABLET ORAL
Qty: 90 TABLET | Refills: 1 | Status: SHIPPED | OUTPATIENT
Start: 2023-01-03

## 2023-01-03 RX ORDER — ORAL SEMAGLUTIDE 7 MG/1
TABLET ORAL
Qty: 90 TABLET | Refills: 1 | OUTPATIENT
Start: 2023-01-03

## 2023-01-05 ENCOUNTER — CLINICAL SUPPORT (OUTPATIENT)
Dept: FAMILY MEDICINE CLINIC | Facility: CLINIC | Age: 58
End: 2023-01-05
Payer: COMMERCIAL

## 2023-01-05 DIAGNOSIS — E11.65 TYPE 2 DIABETES MELLITUS WITH HYPERGLYCEMIA, WITHOUT LONG-TERM CURRENT USE OF INSULIN: Primary | ICD-10-CM

## 2023-01-05 DIAGNOSIS — E78.2 MULTIPLE-TYPE HYPERLIPIDEMIA: ICD-10-CM

## 2023-01-05 DIAGNOSIS — I10 PRIMARY HYPERTENSION: ICD-10-CM

## 2023-01-05 LAB — HBA1C MFR BLD: 5.9 % (ref 3.5–5.6)

## 2023-01-05 PROCEDURE — 80053 COMPREHEN METABOLIC PANEL: CPT | Performed by: NURSE PRACTITIONER

## 2023-01-05 PROCEDURE — 83036 HEMOGLOBIN GLYCOSYLATED A1C: CPT | Performed by: NURSE PRACTITIONER

## 2023-01-05 PROCEDURE — 85027 COMPLETE CBC AUTOMATED: CPT | Performed by: NURSE PRACTITIONER

## 2023-01-05 PROCEDURE — 80061 LIPID PANEL: CPT | Performed by: NURSE PRACTITIONER

## 2023-01-05 PROCEDURE — 36415 COLL VENOUS BLD VENIPUNCTURE: CPT | Performed by: NURSE PRACTITIONER

## 2023-01-05 NOTE — PROGRESS NOTES
Venipuncture Blood Specimen Collection  Venipuncture performed in the left arm by Tammy Garcia MA with good hemostasis. Patient tolerated the procedure well without complications.   01/05/23   Tammy Garcia MA

## 2023-01-06 LAB
ALBUMIN SERPL-MCNC: 4.5 G/DL (ref 3.5–5.2)
ALBUMIN/GLOB SERPL: 1.3 G/DL
ALP SERPL-CCNC: 98 U/L (ref 39–117)
ALT SERPL W P-5'-P-CCNC: 18 U/L (ref 1–33)
ANION GAP SERPL CALCULATED.3IONS-SCNC: 9.6 MMOL/L (ref 5–15)
AST SERPL-CCNC: 15 U/L (ref 1–32)
BILIRUB SERPL-MCNC: 0.4 MG/DL (ref 0–1.2)
BUN SERPL-MCNC: 16 MG/DL (ref 6–20)
BUN/CREAT SERPL: 21.6 (ref 7–25)
CALCIUM SPEC-SCNC: 9.5 MG/DL (ref 8.6–10.5)
CHLORIDE SERPL-SCNC: 100 MMOL/L (ref 98–107)
CHOLEST SERPL-MCNC: 220 MG/DL (ref 0–200)
CO2 SERPL-SCNC: 27.4 MMOL/L (ref 22–29)
CREAT SERPL-MCNC: 0.74 MG/DL (ref 0.57–1)
DEPRECATED RDW RBC AUTO: 39 FL (ref 37–54)
EGFRCR SERPLBLD CKD-EPI 2021: 95.1 ML/MIN/1.73
ERYTHROCYTE [DISTWIDTH] IN BLOOD BY AUTOMATED COUNT: 14.1 % (ref 12.3–15.4)
GLOBULIN UR ELPH-MCNC: 3.4 GM/DL
GLUCOSE SERPL-MCNC: 92 MG/DL (ref 65–99)
HCT VFR BLD AUTO: 38.3 % (ref 34–46.6)
HDLC SERPL-MCNC: 57 MG/DL (ref 40–60)
HGB BLD-MCNC: 12.9 G/DL (ref 12–15.9)
LDLC SERPL CALC-MCNC: 121 MG/DL (ref 0–100)
LDLC/HDLC SERPL: 2.02 {RATIO}
MCH RBC QN AUTO: 26 PG (ref 26.6–33)
MCHC RBC AUTO-ENTMCNC: 33.7 G/DL (ref 31.5–35.7)
MCV RBC AUTO: 77.1 FL (ref 79–97)
PLATELET # BLD AUTO: 244 10*3/MM3 (ref 140–450)
PMV BLD AUTO: 11.7 FL (ref 6–12)
POTASSIUM SERPL-SCNC: 4.2 MMOL/L (ref 3.5–5.2)
PROT SERPL-MCNC: 7.9 G/DL (ref 6–8.5)
RBC # BLD AUTO: 4.97 10*6/MM3 (ref 3.77–5.28)
SODIUM SERPL-SCNC: 137 MMOL/L (ref 136–145)
TRIGL SERPL-MCNC: 239 MG/DL (ref 0–150)
VLDLC SERPL-MCNC: 42 MG/DL (ref 5–40)
WBC NRBC COR # BLD: 8.03 10*3/MM3 (ref 3.4–10.8)

## 2023-01-12 ENCOUNTER — OFFICE VISIT (OUTPATIENT)
Dept: FAMILY MEDICINE CLINIC | Facility: CLINIC | Age: 58
End: 2023-01-12
Payer: COMMERCIAL

## 2023-01-12 VITALS
OXYGEN SATURATION: 94 % | WEIGHT: 234.2 LBS | BODY MASS INDEX: 39.02 KG/M2 | SYSTOLIC BLOOD PRESSURE: 142 MMHG | DIASTOLIC BLOOD PRESSURE: 84 MMHG | HEART RATE: 67 BPM | TEMPERATURE: 97.5 F | HEIGHT: 65 IN

## 2023-01-12 DIAGNOSIS — F41.9 ANXIETY: ICD-10-CM

## 2023-01-12 DIAGNOSIS — E78.2 MULTIPLE-TYPE HYPERLIPIDEMIA: ICD-10-CM

## 2023-01-12 DIAGNOSIS — I10 PRIMARY HYPERTENSION: Primary | ICD-10-CM

## 2023-01-12 DIAGNOSIS — E11.9 TYPE 2 DIABETES MELLITUS WITHOUT COMPLICATION, WITHOUT LONG-TERM CURRENT USE OF INSULIN: ICD-10-CM

## 2023-01-12 DIAGNOSIS — Z12.31 BREAST CANCER SCREENING BY MAMMOGRAM: ICD-10-CM

## 2023-01-12 PROCEDURE — 99214 OFFICE O/P EST MOD 30 MIN: CPT | Performed by: NURSE PRACTITIONER

## 2023-01-12 RX ORDER — LISINOPRIL 20 MG/1
20 TABLET ORAL DAILY
Qty: 90 TABLET | Refills: 1 | Status: SHIPPED | OUTPATIENT
Start: 2023-01-12

## 2023-01-12 RX ORDER — PAROXETINE 10 MG/1
10 TABLET, FILM COATED ORAL DAILY
Qty: 90 TABLET | Refills: 1 | Status: SHIPPED | OUTPATIENT
Start: 2023-01-12

## 2023-01-12 RX ORDER — ATORVASTATIN CALCIUM 20 MG/1
20 TABLET, FILM COATED ORAL DAILY
Qty: 90 TABLET | Refills: 1 | Status: SHIPPED | OUTPATIENT
Start: 2023-01-12

## 2023-01-12 NOTE — PROGRESS NOTES
Chief Complaint  Chief Complaint   Patient presents with   • Annual Exam           Subjective          Gena Harper presents to [facility] for   History of Present Illness     Pt presents for annual exam and to review labs collected on 2023    HTN, stable on meds and takes as directed, denies chest pain, headache, shortness of air, palpitations and swelling of extremities.     Diabetes mellitus type II, feels stable on Rybelsus, however she does report occasional nausea but not daily. Denies any signs/symptoms of hyper/hypoglycemia, blurry vision, polydipsia, polyuria, nocturia, and unintentional weight loss    Hyperlipidemia, The patient denies muscle aches, constipation, diarrhea, GI upset, fatigue, chest pain/pressure, exercise intolerance, dyspnea, palpitations, syncope and pedal edema.      Anxiety, patient denies significant weight loss/gain, insomnia, hypersomnia, psychomotor agitation, psychomotor retardation, fatigue (loss of energy), feelings of worthlessness (guilt), impaired concentration (indecisiveness), thoughts of death or suicide          The following portions of the patient's history were reviewed and updated as appropriate: allergies, current medications, past family history, past medical history, past social history, past surgical history and problem list.    Past Medical History:   Diagnosis Date   • Cataract    • Delivery of     • Hypertension      Past Surgical History:   Procedure Laterality Date   • BREAST BIOPSY Right 2018    core   • CATARACT EXTRACTION Right    • MOUTH SURGERY      Top 2 molar/oral surgery     Family History   Problem Relation Age of Onset   • Heart disease Father    • Kidney disease Father    • Diabetes Maternal Grandmother    • Breast cancer Maternal Grandmother    • Breast cancer Sister 44         at 48     Social History     Tobacco Use   • Smoking status: Never   • Smokeless tobacco: Never   Substance Use  "Topics   • Alcohol use: Yes     Alcohol/week: 3.0 standard drinks     Types: 3 Glasses of wine per week       Current Outpatient Medications:   •  atorvastatin (LIPITOR) 20 MG tablet, Take 1 tablet by mouth Daily., Disp: 90 tablet, Rfl: 1  •  lisinopril (PRINIVIL,ZESTRIL) 20 MG tablet, Take 1 tablet by mouth Daily., Disp: 90 tablet, Rfl: 1  •  PARoxetine (PAXIL) 10 MG tablet, Take 1 tablet by mouth Daily., Disp: 90 tablet, Rfl: 1  •  glucose blood (FREESTYLE TEST STRIPS) test strip, Use to check blood glucose daily and as needed DX: E11.65, Disp: 100 each, Rfl: 11  •  glucose monitoring kit (FREESTYLE) monitoring kit, Please provide glucometer on patient's formulary to check blood glucose daily and as needed. DX: E11.65, Disp: 1 each, Rfl: 0  •  Lancets (freestyle) lancets, Use to check blood glucose daily and as needed DX: E11.65, Disp: 100 each, Rfl: 11  •  Rybelsus 7 MG tablet, TAKE 1 TABLET BY MOUTH EVERY MORNING BEFORE BREAKFAST, Disp: 90 tablet, Rfl: 1    Objective   Vital Signs:   /84 (BP Location: Left arm, Patient Position: Sitting, Cuff Size: Adult)   Pulse 67   Temp 97.5 °F (36.4 °C) (Temporal)   Ht 163.8 cm (64.5\")   Wt 106 kg (234 lb 3.2 oz)   SpO2 94%   BMI 39.58 kg/m²           Physical Exam  Vitals and nursing note reviewed.   Constitutional:       General: She is not in acute distress.     Appearance: She is well-developed. She is not diaphoretic.   Eyes:      Pupils: Pupils are equal, round, and reactive to light.   Neck:      Thyroid: No thyromegaly.      Vascular: No JVD.   Cardiovascular:      Rate and Rhythm: Normal rate and regular rhythm.      Heart sounds: Normal heart sounds. No murmur heard.  Pulmonary:      Effort: Pulmonary effort is normal. No respiratory distress.      Breath sounds: Normal breath sounds.   Abdominal:      General: Bowel sounds are normal. There is no distension.      Palpations: Abdomen is soft.      Tenderness: There is no abdominal tenderness. "   Musculoskeletal:         General: No tenderness. Normal range of motion.      Cervical back: Normal range of motion and neck supple.   Skin:     General: Skin is warm and dry.   Neurological:      Mental Status: She is alert and oriented to person, place, and time.      Sensory: No sensory deficit.   Psychiatric:         Behavior: Behavior normal.         Thought Content: Thought content normal.         Judgment: Judgment normal.          Result Review :     No visits with results within 7 Day(s) from this visit.   Latest known visit with results is:   Clinical Support on 01/05/2023   Component Date Value Ref Range Status   • WBC 01/05/2023 8.03  3.40 - 10.80 10*3/mm3 Final   • RBC 01/05/2023 4.97  3.77 - 5.28 10*6/mm3 Final   • Hemoglobin 01/05/2023 12.9  12.0 - 15.9 g/dL Final   • Hematocrit 01/05/2023 38.3  34.0 - 46.6 % Final   • MCV 01/05/2023 77.1 (L)  79.0 - 97.0 fL Final   • MCH 01/05/2023 26.0 (L)  26.6 - 33.0 pg Final   • MCHC 01/05/2023 33.7  31.5 - 35.7 g/dL Final   • RDW 01/05/2023 14.1  12.3 - 15.4 % Final   • RDW-SD 01/05/2023 39.0  37.0 - 54.0 fl Final   • MPV 01/05/2023 11.7  6.0 - 12.0 fL Final   • Platelets 01/05/2023 244  140 - 450 10*3/mm3 Final   • Glucose 01/05/2023 92  65 - 99 mg/dL Final   • BUN 01/05/2023 16  6 - 20 mg/dL Final   • Creatinine 01/05/2023 0.74  0.57 - 1.00 mg/dL Final   • Sodium 01/05/2023 137  136 - 145 mmol/L Final   • Potassium 01/05/2023 4.2  3.5 - 5.2 mmol/L Final   • Chloride 01/05/2023 100  98 - 107 mmol/L Final   • CO2 01/05/2023 27.4  22.0 - 29.0 mmol/L Final   • Calcium 01/05/2023 9.5  8.6 - 10.5 mg/dL Final   • Total Protein 01/05/2023 7.9  6.0 - 8.5 g/dL Final   • Albumin 01/05/2023 4.5  3.5 - 5.2 g/dL Final   • ALT (SGPT) 01/05/2023 18  1 - 33 U/L Final   • AST (SGOT) 01/05/2023 15  1 - 32 U/L Final   • Alkaline Phosphatase 01/05/2023 98  39 - 117 U/L Final   • Total Bilirubin 01/05/2023 0.4  0.0 - 1.2 mg/dL Final   • Globulin 01/05/2023 3.4  gm/dL Final   •  A/G Ratio 01/05/2023 1.3  g/dL Final   • BUN/Creatinine Ratio 01/05/2023 21.6  7.0 - 25.0 Final   • Anion Gap 01/05/2023 9.6  5.0 - 15.0 mmol/L Final   • eGFR 01/05/2023 95.1  >60.0 mL/min/1.73 Final    National Kidney Foundation and American Society of Nephrology (ASN) Task Force recommended calculation based on the Chronic Kidney Disease Epidemiology Collaboration (CKD-EPI) equation refit without adjustment for race.   • Total Cholesterol 01/05/2023 220 (H)  0 - 200 mg/dL Final   • Triglycerides 01/05/2023 239 (H)  0 - 150 mg/dL Final   • HDL Cholesterol 01/05/2023 57  40 - 60 mg/dL Final   • LDL Cholesterol  01/05/2023 121 (H)  0 - 100 mg/dL Final   • VLDL Cholesterol 01/05/2023 42 (H)  5 - 40 mg/dL Final   • LDL/HDL Ratio 01/05/2023 2.02   Final   • Hemoglobin A1C 01/05/2023 5.9 (H)  3.5 - 5.6 % Final                  Class 2 Severe Obesity (BMI >=35 and <=39.9). Obesity-related health conditions include the following: hypertension, diabetes mellitus and dyslipidemias. Obesity is worsening. BMI is is above average; BMI management plan is completed. We discussed low calorie, low carb based diet program, portion control and increasing exercise.           Assessment and Plan    Diagnoses and all orders for this visit:    1. Primary hypertension (Primary)  Comments:  BP slightly elevated, patient to start checking more often at home and notify if consistently > 140 systolic.    Continue and refill lisinopril  Orders:  -     lisinopril (PRINIVIL,ZESTRIL) 20 MG tablet; Take 1 tablet by mouth Daily.  Dispense: 90 tablet; Refill: 1    2. Anxiety  Comments:  Stable, continue and refill Paxil   Orders:  -     PARoxetine (PAXIL) 10 MG tablet; Take 1 tablet by mouth Daily.  Dispense: 90 tablet; Refill: 1    3. Multiple-type hyperlipidemia  Comments:  Lipids reviewed, more elevated from previous w/ good HDL  continue atorvastatin  rec strict healthy heart and diabetic diet, increase exercise habits  Orders:  -      atorvastatin (LIPITOR) 20 MG tablet; Take 1 tablet by mouth Daily.  Dispense: 90 tablet; Refill: 1    4. Breast cancer screening by mammogram  -     Mammo Screening Digital Tomosynthesis Bilateral With CAD; Future    5. Type 2 diabetes mellitus without complication, without long-term current use of insulin (HCC)  Comments:  Hemoglobin A1c stable at 5.9  continue Rybelsus and refill when needed    Other orders  -     Cancel: Pneumococcal Conjugate Vaccine 20-Valent All      Condition stable  Labs reviewed with patient, will plan to recheck again in 6 months  Mammogram ordered  Will plan pneumococcal 20 vaccine at next appointment, out of vaccine today  Age appropriate preventative counseling provided, including healthy lifestyle modifications and exercise        I spent 30 minutes caring for Gena Harper on this date of service. This time includes time spent by me in the following activities: preparing for the visit, reviewing tests, performing a medically appropriate examination and/or evaluation , counseling and educating the patient/family/caregiver, ordering medications, tests, or procedures and documenting information in the medical record        Follow Up     Return in about 6 months (around 7/12/2023) for Recheck. HTN panel and [id number] prior to appt .  Patient was given instructions and counseling regarding her condition or for health maintenance advice. Please see specific information pulled into the AVS if appropriate.        Part of this note may be an electronic transcription/translation of spoken language to printed text using the Dragon Dictation System

## 2023-02-20 ENCOUNTER — HOSPITAL ENCOUNTER (OUTPATIENT)
Dept: MAMMOGRAPHY | Facility: HOSPITAL | Age: 58
Discharge: HOME | End: 2023-02-20
Admitting: NURSE PRACTITIONER
Payer: COMMERCIAL

## 2023-02-20 DIAGNOSIS — Z12.31 BREAST CANCER SCREENING BY MAMMOGRAM: ICD-10-CM

## 2023-02-20 PROCEDURE — 77063 BREAST TOMOSYNTHESIS BI: CPT

## 2023-02-20 PROCEDURE — 77067 SCR MAMMO BI INCL CAD: CPT

## 2023-03-28 ENCOUNTER — TELEPHONE (OUTPATIENT)
Dept: FAMILY MEDICINE CLINIC | Facility: CLINIC | Age: 58
End: 2023-03-28
Payer: COMMERCIAL

## 2023-03-28 NOTE — TELEPHONE ENCOUNTER
Spoke to pt about requesting copy of diabetic eye exam from eye dr, pt states she has not seen eye dr since 2017 but will let us know when she finds a new provider

## 2023-06-12 ENCOUNTER — TELEPHONE (OUTPATIENT)
Dept: FAMILY MEDICINE CLINIC | Facility: CLINIC | Age: 58
End: 2023-06-12

## 2023-06-12 RX ORDER — BACLOFEN 10 MG/1
10 TABLET ORAL 3 TIMES DAILY PRN
Qty: 30 TABLET | Refills: 0 | Status: SHIPPED | OUTPATIENT
Start: 2023-06-12

## 2023-06-12 RX ORDER — METHYLPREDNISOLONE 4 MG/1
TABLET ORAL
Qty: 21 TABLET | Refills: 0 | Status: SHIPPED | OUTPATIENT
Start: 2023-06-12

## 2023-06-12 NOTE — TELEPHONE ENCOUNTER
Caller: Gena Harper     Relationship: SELF     Best call back number:     [phone] (Mobile)       What is your medical concern? LEFT SHOULDER PAIN, URGENT CARE CONFIRMED NOT BROKEN    How long has this issue been going on? SATURDAY MORNING     Is your provider already aware of this issue? NO     Have you been treated for this issue? NO

## 2023-07-27 ENCOUNTER — OFFICE VISIT (OUTPATIENT)
Dept: FAMILY MEDICINE CLINIC | Facility: CLINIC | Age: 58
End: 2023-07-27
Payer: COMMERCIAL

## 2023-07-27 VITALS
OXYGEN SATURATION: 95 % | HEIGHT: 64 IN | RESPIRATION RATE: 18 BRPM | WEIGHT: 232.8 LBS | TEMPERATURE: 98.4 F | BODY MASS INDEX: 39.75 KG/M2 | HEART RATE: 75 BPM | DIASTOLIC BLOOD PRESSURE: 70 MMHG | SYSTOLIC BLOOD PRESSURE: 128 MMHG

## 2023-07-27 DIAGNOSIS — Z23 NEED FOR SHINGLES VACCINE: ICD-10-CM

## 2023-07-27 DIAGNOSIS — I10 PRIMARY HYPERTENSION: ICD-10-CM

## 2023-07-27 DIAGNOSIS — M25.511 ACUTE PAIN OF RIGHT SHOULDER: ICD-10-CM

## 2023-07-27 DIAGNOSIS — Z23 NEED FOR PNEUMOCOCCAL VACCINE: ICD-10-CM

## 2023-07-27 DIAGNOSIS — E78.2 MULTIPLE-TYPE HYPERLIPIDEMIA: ICD-10-CM

## 2023-07-27 DIAGNOSIS — F41.9 ANXIETY: ICD-10-CM

## 2023-07-27 DIAGNOSIS — D50.9 IRON DEFICIENCY ANEMIA, UNSPECIFIED IRON DEFICIENCY ANEMIA TYPE: ICD-10-CM

## 2023-07-27 DIAGNOSIS — E11.65 TYPE 2 DIABETES MELLITUS WITH HYPERGLYCEMIA, WITHOUT LONG-TERM CURRENT USE OF INSULIN: Primary | ICD-10-CM

## 2023-07-27 RX ORDER — METHYLPREDNISOLONE 4 MG/1
TABLET ORAL
Qty: 21 TABLET | Refills: 0 | Status: SHIPPED | OUTPATIENT
Start: 2023-07-27

## 2023-07-27 NOTE — PROGRESS NOTES
Chief Complaint  Chief Complaint   Patient presents with    Diabetes     Does not check blood sugar. Eye Exam not up to date, 3-4 years ago.    Shoulder Pain     Right shoulder pain. Onset x a couple weeks. Worse with raising arm. Previously prescribed prednisone and muscle relaxer for left shoulder which resolved symptoms.           Subjective          Gena Harper presents to [facility] for     History of Presenting Illness    HTN, stable on meds and takes as directed, denies chest pain, headache, shortness of air, palpitations and swelling of extremities.      Diabetes mellitus type II, feels stable on Rybelsus, she does report occasional nausea but not daily. Denies any signs/symptoms of hyper/hypoglycemia, blurry vision, polydipsia, polyuria, nocturia, and unintentional weight loss     Hyperlipidemia, The patient denies muscle aches, constipation, diarrhea, GI upset, fatigue, chest pain/pressure, exercise intolerance, dyspnea, palpitations, syncope and pedal edema.       Anxiety, patient denies significant weight loss/gain, insomnia, hypersomnia, psychomotor agitation, psychomotor retardation, fatigue (loss of energy), feelings of worthlessness (guilt), impaired concentration (indecisiveness), thoughts of death or suicide     R shoulder pain, she helped her son move and hurt the left shoulder, medrol and baclofen resolved the symptoms, the right shoulder now hurts, she is having trouble lifting above the level of the shoulder, it aches and feels tight.     Hx of anemia, takes iron daily    Here to review labs      The following portions of the patient's history were reviewed and updated as appropriate: allergies, current medications, past family history, past medical history, past social history, past surgical history and problem list.    Past Medical History:   Diagnosis Date    Cataract     Delivery of      Hypertension      Past Surgical History:   Procedure  "Laterality Date    BREAST BIOPSY Right 2018    core    CATARACT EXTRACTION Right     MOUTH SURGERY      Top 2 molar/oral surgery     Family History   Problem Relation Age of Onset    Heart disease Father     Kidney disease Father     Diabetes Maternal Grandmother     Breast cancer Maternal Grandmother     Breast cancer Sister 44         at 48    Cancer Sister         Breast cancer -  2012     Social History     Tobacco Use    Smoking status: Never    Smokeless tobacco: Never   Substance Use Topics    Alcohol use: Yes     Alcohol/week: 4.0 standard drinks     Types: 2 Glasses of wine, 2 Shots of liquor per week       Current Outpatient Medications:     atorvastatin (LIPITOR) 20 MG tablet, TAKE 1 TABLET BY MOUTH DAILY, Disp: 90 tablet, Rfl: 1    glucose blood (FREESTYLE TEST STRIPS) test strip, Use to check blood glucose daily and as needed DX: E11.65, Disp: 100 each, Rfl: 11    glucose monitoring kit (FREESTYLE) monitoring kit, Please provide glucometer on patient's formulary to check blood glucose daily and as needed. DX: E11.65, Disp: 1 each, Rfl: 0    Lancets (freestyle) lancets, Use to check blood glucose daily and as needed DX: E11.65, Disp: 100 each, Rfl: 11    lisinopril (PRINIVIL,ZESTRIL) 20 MG tablet, TAKE 1 TABLET BY MOUTH DAILY, Disp: 90 tablet, Rfl: 1    methylPREDNISolone (MEDROL) 4 MG dose pack, Take as directed on package instructions., Disp: 21 tablet, Rfl: 0    PARoxetine (PAXIL) 10 MG tablet, TAKE 1 TABLET BY MOUTH DAILY, Disp: 90 tablet, Rfl: 1    Rybelsus 7 MG tablet, TAKE 1 TABLET BY MOUTH EVERY MORNING BEFORE BREAKFAST, Disp: 90 tablet, Rfl: 1    Objective   Vital Signs:   /70 (BP Location: Left arm, Patient Position: Sitting, Cuff Size: Large Adult)   Pulse 75   Temp 98.4 °F (36.9 °C) (Oral)   Resp 18   Ht 162.6 cm (64\")   Wt 106 kg (232 lb 12.8 oz)   SpO2 95% Comment: Room air  BMI 39.96 kg/m²           Physical Exam  Constitutional:       General: She is " not in acute distress.     Appearance: Normal appearance. She is well-developed. She is not ill-appearing or diaphoretic.   HENT:      Head: Normocephalic.   Eyes:      Conjunctiva/sclera: Conjunctivae normal.      Pupils: Pupils are equal, round, and reactive to light.   Neck:      Thyroid: No thyromegaly.      Vascular: No JVD.   Cardiovascular:      Rate and Rhythm: Normal rate and regular rhythm.      Heart sounds: Normal heart sounds. No murmur heard.  Pulmonary:      Effort: Pulmonary effort is normal. No respiratory distress.      Breath sounds: Normal breath sounds. No wheezing or rhonchi.   Abdominal:      General: Bowel sounds are normal. There is no distension.      Palpations: Abdomen is soft.      Tenderness: There is no abdominal tenderness.   Musculoskeletal:         General: Tenderness (R shoulder ttp laterally at acromion, mod dawson rom, no crepitus) present. No swelling. Normal range of motion.      Cervical back: Normal range of motion and neck supple. No tenderness.   Lymphadenopathy:      Cervical: No cervical adenopathy.   Skin:     General: Skin is warm and dry.      Coloration: Skin is not jaundiced.      Findings: No erythema or rash.   Neurological:      General: No focal deficit present.      Mental Status: She is alert and oriented to person, place, and time. Mental status is at baseline.      Sensory: No sensory deficit.   Psychiatric:         Mood and Affect: Mood normal.         Behavior: Behavior normal.         Thought Content: Thought content normal.         Judgment: Judgment normal.        Result Review :     No visits with results within 7 Day(s) from this visit.   Latest known visit with results is:   Clinical Support on 07/06/2023   Component Date Value Ref Range Status    Hemoglobin A1C 07/06/2023 6.60 (H)  4.80 - 5.60 % Final    Total Cholesterol 07/06/2023 192  0 - 200 mg/dL Final    Triglycerides 07/06/2023 269 (H)  0 - 150 mg/dL Final    HDL Cholesterol 07/06/2023 47  40 -  60 mg/dL Final    LDL Cholesterol  07/06/2023 100  0 - 100 mg/dL Final    VLDL Cholesterol 07/06/2023 45 (H)  5 - 40 mg/dL Final    LDL/HDL Ratio 07/06/2023 1.94   Final    Glucose 07/06/2023 94  65 - 99 mg/dL Final    BUN 07/06/2023 14  6 - 20 mg/dL Final    Creatinine 07/06/2023 0.74  0.57 - 1.00 mg/dL Final    Sodium 07/06/2023 138  136 - 145 mmol/L Final    Potassium 07/06/2023 4.2  3.5 - 5.2 mmol/L Final    Chloride 07/06/2023 102  98 - 107 mmol/L Final    CO2 07/06/2023 25.4  22.0 - 29.0 mmol/L Final    Calcium 07/06/2023 9.7  8.6 - 10.5 mg/dL Final    Total Protein 07/06/2023 7.3  6.0 - 8.5 g/dL Final    Albumin 07/06/2023 4.1  3.5 - 5.2 g/dL Final    ALT (SGPT) 07/06/2023 19  1 - 33 U/L Final    AST (SGOT) 07/06/2023 15  1 - 32 U/L Final    Alkaline Phosphatase 07/06/2023 98  39 - 117 U/L Final    Total Bilirubin 07/06/2023 0.3  0.0 - 1.2 mg/dL Final    Globulin 07/06/2023 3.2  gm/dL Final    A/G Ratio 07/06/2023 1.3  g/dL Final    BUN/Creatinine Ratio 07/06/2023 18.9  7.0 - 25.0 Final    Anion Gap 07/06/2023 10.6  5.0 - 15.0 mmol/L Final    eGFR 07/06/2023 95.1  >60.0 mL/min/1.73 Final    WBC 07/06/2023 7.36  3.40 - 10.80 10*3/mm3 Final    RBC 07/06/2023 4.56  3.77 - 5.28 10*6/mm3 Final    Hemoglobin 07/06/2023 11.9 (L)  12.0 - 15.9 g/dL Final    Hematocrit 07/06/2023 36.3  34.0 - 46.6 % Final    MCV 07/06/2023 79.6  79.0 - 97.0 fL Final    MCH 07/06/2023 26.1 (L)  26.6 - 33.0 pg Final    MCHC 07/06/2023 32.8  31.5 - 35.7 g/dL Final    RDW 07/06/2023 14.4  12.3 - 15.4 % Final    RDW-SD 07/06/2023 41.6  37.0 - 54.0 fl Final    MPV 07/06/2023 10.7  6.0 - 12.0 fL Final    Platelets 07/06/2023 310  140 - 450 10*3/mm3 Final    TSH 07/06/2023 1.540  0.270 - 4.200 uIU/mL Final                              Assessment and Plan    Diagnoses and all orders for this visit:    1. Type 2 diabetes mellitus with hyperglycemia, without long-term current use of insulin (Primary)  Comments:  A1c up to 6.6, possibly secondary  to recent use of steroid  rec diet modifications, limit carbohydrates  Continue Rybelsus 7 mg daily  rec eye exam    2. Primary hypertension  Comments:  BP stable    3. Multiple-type hyperlipidemia  Comments:  Lipids stable with elevation of triglycerides  work on HHD limiting fatty, fried and greasy foods  cont statin    4. Need for shingles vaccine    5. Need for pneumococcal vaccine    6. Anxiety  Comments:  Stable, continue Paxil    7. Acute pain of right shoulder  Comments:  Recommend Medrol Dosepak  Ibuprofen as needed  Rest and ice   ok to use baclofen prn leftover from previous L shoulder inj    Other orders  -     Pneumococcal Conjugate Vaccine 20-Valent All  -     Shingrix Vaccine  -     methylPREDNISolone (MEDROL) 4 MG dose pack; Take as directed on package instructions.  Dispense: 21 tablet; Refill: 0      Mammogram up-to-date  Has new grandchild on the way, Tdap up-to-date      I spent 30 minutes caring for Gena Harper on this date of service. This time includes time spent by me in the following activities: preparing for the visit, reviewing tests, performing a medically appropriate examination and/or evaluation , counseling and educating the patient/family/caregiver, ordering medications, tests, or procedures and documenting information in the medical record        Follow Up     Return in about 6 months (around 1/27/2024) for annual exam/Recheck DM, HTN, anxiety. DM panel and urine micro prior. RTC 10 wks for shingrix #2.  Patient was given instructions and counseling regarding her condition or for health maintenance advice. Please see specific information pulled into the AVS if appropriate.        Part of this note may be an electronic transcription/translation of spoken language to printed text using the Dragon Dictation System

## 2023-10-02 ENCOUNTER — CLINICAL SUPPORT (OUTPATIENT)
Dept: FAMILY MEDICINE CLINIC | Facility: CLINIC | Age: 58
End: 2023-10-02
Payer: COMMERCIAL

## 2023-10-02 DIAGNOSIS — Z23 NEED FOR SHINGLES VACCINE: Primary | ICD-10-CM

## 2023-10-02 PROCEDURE — 90750 HZV VACC RECOMBINANT IM: CPT | Performed by: NURSE PRACTITIONER

## 2023-12-18 DIAGNOSIS — E11.65 TYPE 2 DIABETES MELLITUS WITH HYPERGLYCEMIA, WITHOUT LONG-TERM CURRENT USE OF INSULIN: ICD-10-CM

## 2023-12-18 RX ORDER — ORAL SEMAGLUTIDE 7 MG/1
TABLET ORAL
Qty: 90 TABLET | Refills: 1 | OUTPATIENT
Start: 2023-12-18

## 2023-12-18 RX ORDER — ORAL SEMAGLUTIDE 7 MG/1
TABLET ORAL
Qty: 90 TABLET | Refills: 1 | Status: SHIPPED | OUTPATIENT
Start: 2023-12-18

## 2024-01-17 DIAGNOSIS — E11.65 TYPE 2 DIABETES MELLITUS WITH HYPERGLYCEMIA, WITHOUT LONG-TERM CURRENT USE OF INSULIN: Primary | ICD-10-CM

## 2024-01-17 DIAGNOSIS — I10 PRIMARY HYPERTENSION: ICD-10-CM

## 2024-01-17 DIAGNOSIS — E78.2 MULTIPLE-TYPE HYPERLIPIDEMIA: ICD-10-CM

## 2024-01-25 ENCOUNTER — CLINICAL SUPPORT (OUTPATIENT)
Dept: FAMILY MEDICINE CLINIC | Facility: CLINIC | Age: 59
End: 2024-01-25
Payer: COMMERCIAL

## 2024-01-25 DIAGNOSIS — I10 PRIMARY HYPERTENSION: Primary | ICD-10-CM

## 2024-01-25 LAB
DEPRECATED RDW RBC AUTO: 42.8 FL (ref 37–54)
ERYTHROCYTE [DISTWIDTH] IN BLOOD BY AUTOMATED COUNT: 15 % (ref 12.3–15.4)
HBA1C MFR BLD: 6.6 % (ref 4.8–5.6)
HCT VFR BLD AUTO: 39 % (ref 34–46.6)
HGB BLD-MCNC: 12.7 G/DL (ref 12–15.9)
MCH RBC QN AUTO: 26 PG (ref 26.6–33)
MCHC RBC AUTO-ENTMCNC: 32.6 G/DL (ref 31.5–35.7)
MCV RBC AUTO: 79.8 FL (ref 79–97)
PLATELET # BLD AUTO: 241 10*3/MM3 (ref 140–450)
PMV BLD AUTO: 10.9 FL (ref 6–12)
RBC # BLD AUTO: 4.89 10*6/MM3 (ref 3.77–5.28)
WBC NRBC COR # BLD AUTO: 8.09 10*3/MM3 (ref 3.4–10.8)

## 2024-01-25 PROCEDURE — 82043 UR ALBUMIN QUANTITATIVE: CPT | Performed by: NURSE PRACTITIONER

## 2024-01-25 PROCEDURE — 80061 LIPID PANEL: CPT | Performed by: NURSE PRACTITIONER

## 2024-01-25 PROCEDURE — 80050 GENERAL HEALTH PANEL: CPT | Performed by: NURSE PRACTITIONER

## 2024-01-25 PROCEDURE — 83036 HEMOGLOBIN GLYCOSYLATED A1C: CPT | Performed by: NURSE PRACTITIONER

## 2024-01-25 PROCEDURE — 36415 COLL VENOUS BLD VENIPUNCTURE: CPT | Performed by: NURSE PRACTITIONER

## 2024-01-25 NOTE — PROGRESS NOTES
Venipuncture Blood Specimen Collection  Venipuncture performed in the right arm by Tammy Garcia MA with good hemostasis. Patient tolerated the procedure well without complications.   01/25/24   Tammy Garcia MA

## 2024-01-26 LAB
ALBUMIN SERPL-MCNC: 4.5 G/DL (ref 3.5–5.2)
ALBUMIN UR-MCNC: <1.2 MG/DL
ALBUMIN/GLOB SERPL: 1.7 G/DL
ALP SERPL-CCNC: 97 U/L (ref 39–117)
ALT SERPL W P-5'-P-CCNC: 17 U/L (ref 1–33)
ANION GAP SERPL CALCULATED.3IONS-SCNC: 10 MMOL/L (ref 5–15)
AST SERPL-CCNC: 12 U/L (ref 1–32)
BILIRUB SERPL-MCNC: 0.3 MG/DL (ref 0–1.2)
BUN SERPL-MCNC: 14 MG/DL (ref 6–20)
BUN/CREAT SERPL: 17.5 (ref 7–25)
CALCIUM SPEC-SCNC: 9.5 MG/DL (ref 8.6–10.5)
CHLORIDE SERPL-SCNC: 103 MMOL/L (ref 98–107)
CHOLEST SERPL-MCNC: 193 MG/DL (ref 0–200)
CO2 SERPL-SCNC: 28 MMOL/L (ref 22–29)
CREAT SERPL-MCNC: 0.8 MG/DL (ref 0.57–1)
EGFRCR SERPLBLD CKD-EPI 2021: 86.1 ML/MIN/1.73
GLOBULIN UR ELPH-MCNC: 2.7 GM/DL
GLUCOSE SERPL-MCNC: 97 MG/DL (ref 65–99)
HDLC SERPL-MCNC: 54 MG/DL (ref 40–60)
LDLC SERPL CALC-MCNC: 94 MG/DL (ref 0–100)
LDLC/HDLC SERPL: 1.58 {RATIO}
POTASSIUM SERPL-SCNC: 4.4 MMOL/L (ref 3.5–5.2)
PROT SERPL-MCNC: 7.2 G/DL (ref 6–8.5)
SODIUM SERPL-SCNC: 141 MMOL/L (ref 136–145)
TRIGL SERPL-MCNC: 269 MG/DL (ref 0–150)
TSH SERPL DL<=0.05 MIU/L-ACNC: 1.27 UIU/ML (ref 0.27–4.2)
VLDLC SERPL-MCNC: 45 MG/DL (ref 5–40)

## 2024-01-29 ENCOUNTER — OFFICE VISIT (OUTPATIENT)
Dept: FAMILY MEDICINE CLINIC | Facility: CLINIC | Age: 59
End: 2024-01-29
Payer: COMMERCIAL

## 2024-01-29 VITALS
HEIGHT: 64 IN | RESPIRATION RATE: 18 BRPM | SYSTOLIC BLOOD PRESSURE: 121 MMHG | DIASTOLIC BLOOD PRESSURE: 79 MMHG | HEART RATE: 67 BPM | OXYGEN SATURATION: 96 % | WEIGHT: 234 LBS | TEMPERATURE: 98.4 F | BODY MASS INDEX: 39.95 KG/M2

## 2024-01-29 DIAGNOSIS — J02.9 PHARYNGITIS, UNSPECIFIED ETIOLOGY: ICD-10-CM

## 2024-01-29 DIAGNOSIS — Z12.31 BREAST CANCER SCREENING BY MAMMOGRAM: ICD-10-CM

## 2024-01-29 DIAGNOSIS — Z12.11 COLON CANCER SCREENING: ICD-10-CM

## 2024-01-29 DIAGNOSIS — E78.2 MULTIPLE-TYPE HYPERLIPIDEMIA: ICD-10-CM

## 2024-01-29 DIAGNOSIS — E11.65 TYPE 2 DIABETES MELLITUS WITH HYPERGLYCEMIA, WITHOUT LONG-TERM CURRENT USE OF INSULIN: ICD-10-CM

## 2024-01-29 DIAGNOSIS — F41.9 ANXIETY: ICD-10-CM

## 2024-01-29 DIAGNOSIS — I10 PRIMARY HYPERTENSION: Primary | ICD-10-CM

## 2024-01-29 PROCEDURE — 99214 OFFICE O/P EST MOD 30 MIN: CPT | Performed by: NURSE PRACTITIONER

## 2024-01-29 RX ORDER — ATORVASTATIN CALCIUM 20 MG/1
20 TABLET, FILM COATED ORAL DAILY
Qty: 90 TABLET | Refills: 1 | Status: SHIPPED | OUTPATIENT
Start: 2024-01-29

## 2024-01-29 RX ORDER — LISINOPRIL 20 MG/1
20 TABLET ORAL DAILY
Qty: 90 TABLET | Refills: 1 | Status: SHIPPED | OUTPATIENT
Start: 2024-01-29

## 2024-01-29 RX ORDER — PAROXETINE 10 MG/1
10 TABLET, FILM COATED ORAL DAILY
Qty: 90 TABLET | Refills: 1 | Status: SHIPPED | OUTPATIENT
Start: 2024-01-29

## 2024-01-29 NOTE — PROGRESS NOTES
Chief Complaint  Chief Complaint   Patient presents with    Follow-up     6m, DM, HTN & Anxiety    Neck Pain     Sore neck & throat , begun 25           Subjective          Gena Harper presents to [facility] for   History of Present Illness    She c/o sore throat, clearing of throat, mild cough that started yesterday, her  has bronchitis.  She denies any other symptoms including fever, headache, dyspnea, body aches    Diabetes mellitus type II, feels stable on Rybelsus, however reports occasional nausea, denies any signs/symptoms of hyper/hypoglycemia, blurry vision, polydipsia, polyuria, nocturia, and unintentional weight loss    HTN, stable on meds and takes as directed, denies chest pain, headache, shortness of air, palpitations and swelling of extremities.     Anxiety, patient is stable on Paxil    Hyperlipidemia, takes fish oil 1/day, garlique, flaxseed and statin. The patient denies muscle aches, constipation, diarrhea, GI upset, fatigue, chest pain/pressure, exercise intolerance, dyspnea, palpitations, syncope and pedal edema.      Here to review labs      The following portions of the patient's history were reviewed and updated as appropriate: allergies, current medications, past family history, past medical history, past social history, past surgical history and problem list.    Past Medical History:   Diagnosis Date    Cataract     Delivery of      Hypertension      Past Surgical History:   Procedure Laterality Date    BREAST BIOPSY Right 2018    core    CATARACT EXTRACTION Right     MOUTH SURGERY      Top 2 molar/oral surgery     Family History   Problem Relation Age of Onset    Heart disease Father     Kidney disease Father     Diabetes Maternal Grandmother     Breast cancer Maternal Grandmother     Breast cancer Sister 44         at 48    Cancer Sister         Breast cancer -  2012     Social History     Tobacco Use     "Smoking status: Never    Smokeless tobacco: Never   Substance Use Topics    Alcohol use: Yes     Alcohol/week: 4.0 standard drinks of alcohol     Types: 2 Glasses of wine, 2 Shots of liquor per week       Current Outpatient Medications:     atorvastatin (LIPITOR) 20 MG tablet, Take 1 tablet by mouth Daily., Disp: 90 tablet, Rfl: 1    glucose blood (FREESTYLE TEST STRIPS) test strip, Use to check blood glucose daily and as needed DX: E11.65, Disp: 100 each, Rfl: 11    glucose monitoring kit (FREESTYLE) monitoring kit, Please provide glucometer on patient's formulary to check blood glucose daily and as needed. DX: E11.65, Disp: 1 each, Rfl: 0    Lancets (freestyle) lancets, Use to check blood glucose daily and as needed DX: E11.65, Disp: 100 each, Rfl: 11    lisinopril (PRINIVIL,ZESTRIL) 20 MG tablet, Take 1 tablet by mouth Daily., Disp: 90 tablet, Rfl: 1    PARoxetine (PAXIL) 10 MG tablet, Take 1 tablet by mouth Daily., Disp: 90 tablet, Rfl: 1    Rybelsus 7 MG tablet, TAKE 1 TABLET BY MOUTH EVERY MORNING BEFORE BREAKFAST, Disp: 90 tablet, Rfl: 1    Objective   Vital Signs:   /79 (BP Location: Right arm, Patient Position: Sitting, Cuff Size: Large Adult)   Pulse 67   Temp 98.4 °F (36.9 °C) (Temporal)   Resp 18   Ht 162.6 cm (64\")   Wt 106 kg (234 lb)   SpO2 96%   BMI 40.17 kg/m²           Physical Exam  Constitutional:       General: She is not in acute distress.     Appearance: Normal appearance. She is well-developed. She is not ill-appearing or diaphoretic.   HENT:      Head: Normocephalic.      Right Ear: Tympanic membrane and ear canal normal.      Left Ear: Tympanic membrane and ear canal normal.      Nose: Congestion present. No rhinorrhea.      Comments: Clear postnasal drip     Mouth/Throat:      Pharynx: No posterior oropharyngeal erythema.   Eyes:      Conjunctiva/sclera: Conjunctivae normal.      Pupils: Pupils are equal, round, and reactive to light.   Neck:      Thyroid: No thyromegaly.      " Vascular: No JVD.   Cardiovascular:      Rate and Rhythm: Normal rate and regular rhythm.      Heart sounds: Normal heart sounds. No murmur heard.  Pulmonary:      Effort: Pulmonary effort is normal. No respiratory distress.      Breath sounds: Normal breath sounds. No wheezing or rhonchi.   Abdominal:      General: Bowel sounds are normal. There is no distension.      Palpations: Abdomen is soft.      Tenderness: There is no abdominal tenderness.   Musculoskeletal:         General: No swelling or tenderness. Normal range of motion.      Cervical back: Normal range of motion and neck supple. No tenderness.   Lymphadenopathy:      Cervical: No cervical adenopathy.   Skin:     General: Skin is warm and dry.      Coloration: Skin is not jaundiced.      Findings: No erythema or rash.   Neurological:      General: No focal deficit present.      Mental Status: She is alert and oriented to person, place, and time. Mental status is at baseline.      Sensory: No sensory deficit.      Motor: No weakness.      Gait: Gait normal.   Psychiatric:         Mood and Affect: Mood normal.         Behavior: Behavior normal.         Thought Content: Thought content normal.         Judgment: Judgment normal.          Result Review :     No visits with results within 7 Day(s) from this visit.   Latest known visit with results is:   Orders Only on 01/17/2024   Component Date Value Ref Range Status    Glucose 01/25/2024 97  65 - 99 mg/dL Final    BUN 01/25/2024 14  6 - 20 mg/dL Final    Creatinine 01/25/2024 0.80  0.57 - 1.00 mg/dL Final    Sodium 01/25/2024 141  136 - 145 mmol/L Final    Potassium 01/25/2024 4.4  3.5 - 5.2 mmol/L Final    Chloride 01/25/2024 103  98 - 107 mmol/L Final    CO2 01/25/2024 28.0  22.0 - 29.0 mmol/L Final    Calcium 01/25/2024 9.5  8.6 - 10.5 mg/dL Final    Total Protein 01/25/2024 7.2  6.0 - 8.5 g/dL Final    Albumin 01/25/2024 4.5  3.5 - 5.2 g/dL Final    ALT (SGPT) 01/25/2024 17  1 - 33 U/L Final    AST  (SGOT) 01/25/2024 12  1 - 32 U/L Final    Alkaline Phosphatase 01/25/2024 97  39 - 117 U/L Final    Total Bilirubin 01/25/2024 0.3  0.0 - 1.2 mg/dL Final    Globulin 01/25/2024 2.7  gm/dL Final    A/G Ratio 01/25/2024 1.7  g/dL Final    BUN/Creatinine Ratio 01/25/2024 17.5  7.0 - 25.0 Final    Anion Gap 01/25/2024 10.0  5.0 - 15.0 mmol/L Final    eGFR 01/25/2024 86.1  >60.0 mL/min/1.73 Final    WBC 01/25/2024 8.09  3.40 - 10.80 10*3/mm3 Final    RBC 01/25/2024 4.89  3.77 - 5.28 10*6/mm3 Final    Hemoglobin 01/25/2024 12.7  12.0 - 15.9 g/dL Final    Hematocrit 01/25/2024 39.0  34.0 - 46.6 % Final    MCV 01/25/2024 79.8  79.0 - 97.0 fL Final    MCH 01/25/2024 26.0 (L)  26.6 - 33.0 pg Final    MCHC 01/25/2024 32.6  31.5 - 35.7 g/dL Final    RDW 01/25/2024 15.0  12.3 - 15.4 % Final    RDW-SD 01/25/2024 42.8  37.0 - 54.0 fl Final    MPV 01/25/2024 10.9  6.0 - 12.0 fL Final    Platelets 01/25/2024 241  140 - 450 10*3/mm3 Final    Hemoglobin A1C 01/25/2024 6.60 (H)  4.80 - 5.60 % Final    Total Cholesterol 01/25/2024 193  0 - 200 mg/dL Final    Triglycerides 01/25/2024 269 (H)  0 - 150 mg/dL Final    HDL Cholesterol 01/25/2024 54  40 - 60 mg/dL Final    LDL Cholesterol  01/25/2024 94  0 - 100 mg/dL Final    VLDL Cholesterol 01/25/2024 45 (H)  5 - 40 mg/dL Final    LDL/HDL Ratio 01/25/2024 1.58   Final    TSH 01/25/2024 1.270  0.270 - 4.200 uIU/mL Final    Microalbumin, Urine 01/25/2024 <1.2  mg/dL Final                              Assessment and Plan    Diagnoses and all orders for this visit:    1. Primary hypertension (Primary)  Comments:  BP stable, Continue and refill lisinopril  Orders:  -     lisinopril (PRINIVIL,ZESTRIL) 20 MG tablet; Take 1 tablet by mouth Daily.  Dispense: 90 tablet; Refill: 1    2. Multiple-type hyperlipidemia  Comments:  Lipids reviewed, trigs worse, w/ good HDL  cont atorvastatin, flax, increase fish oil to 2 g/day  rec strict HH and diabetic diet, increase exercise habits  Orders:  -      atorvastatin (LIPITOR) 20 MG tablet; Take 1 tablet by mouth Daily.  Dispense: 90 tablet; Refill: 1    3. Anxiety  Comments:  Stable, continue and refill Paxil   Orders:  -     PARoxetine (PAXIL) 10 MG tablet; Take 1 tablet by mouth Daily.  Dispense: 90 tablet; Refill: 1    4. Colon cancer screening  -     Cologuard - Stool, Per Rectum; Future    5. Type 2 diabetes mellitus with hyperglycemia, without long-term current use of insulin  Comments:  A1c up slightly to 6.6  Recommend working on diabetic diet improvements  Increase exercise  Continue Rybelsus 7mg, no inc d/t nausea    6. Breast cancer screening by mammogram  -     Mammo Screening Digital Tomosynthesis Bilateral With CAD; Future    7. Pharyngitis, unspecified etiology  Comments:  2/2 postnasal gtt, Likely allergy versus weather change versus viral  rec otc DayQuil/NyQuil or Mucinex DM        I spent 30 minutes caring for Gena Harper on this date of service. This time includes time spent by me in the following activities: preparing for the visit, reviewing tests, performing a medically appropriate examination and/or evaluation , counseling and educating the patient/family/caregiver, ordering medications, tests, or procedures and documenting information in the medical record        Follow Up     Return in about 6 months (around 7/29/2024) for Recheck, Annual physical/pap. DM panel prior to appt.  Patient was given instructions and counseling regarding her condition or for health maintenance advice. Please see specific information pulled into the AVS if appropriate.        Part of this note may be an electronic transcription/translation of spoken language to printed text using the Dragon Dictation System

## 2024-02-16 ENCOUNTER — APPOINTMENT (OUTPATIENT)
Dept: CT IMAGING | Facility: HOSPITAL | Age: 59
DRG: 395 | End: 2024-02-16
Payer: COMMERCIAL

## 2024-02-16 ENCOUNTER — HOSPITAL ENCOUNTER (INPATIENT)
Facility: HOSPITAL | Age: 59
LOS: 1 days | Discharge: HOME | DRG: 395 | End: 2024-02-17
Attending: EMERGENCY MEDICINE | Admitting: HOSPITALIST
Payer: COMMERCIAL

## 2024-02-16 DIAGNOSIS — D25.1 INTRAMURAL LEIOMYOMA OF UTERUS: ICD-10-CM

## 2024-02-16 DIAGNOSIS — K62.89 RECTAL OR ANAL PAIN: ICD-10-CM

## 2024-02-16 DIAGNOSIS — K61.0 PERIANAL ABSCESS: Primary | ICD-10-CM

## 2024-02-16 LAB
ALBUMIN SERPL-MCNC: 3.8 G/DL (ref 3.5–5.2)
ALBUMIN/GLOB SERPL: 1.1 G/DL
ALP SERPL-CCNC: 113 U/L (ref 39–117)
ALT SERPL W P-5'-P-CCNC: 18 U/L (ref 1–33)
ANION GAP SERPL CALCULATED.3IONS-SCNC: 10 MMOL/L (ref 5–15)
ANION GAP SERPL CALCULATED.3IONS-SCNC: 11 MMOL/L (ref 5–15)
AST SERPL-CCNC: 16 U/L (ref 1–32)
BASOPHILS # BLD AUTO: 0 10*3/MM3 (ref 0–0.2)
BASOPHILS # BLD AUTO: 0 10*3/MM3 (ref 0–0.2)
BASOPHILS NFR BLD AUTO: 0.2 % (ref 0–1.5)
BASOPHILS NFR BLD AUTO: 0.3 % (ref 0–1.5)
BILIRUB SERPL-MCNC: 0.3 MG/DL (ref 0–1.2)
BUN SERPL-MCNC: 10 MG/DL (ref 6–20)
BUN SERPL-MCNC: 14 MG/DL (ref 6–20)
BUN/CREAT SERPL: 15.2 (ref 7–25)
BUN/CREAT SERPL: 17.5 (ref 7–25)
CALCIUM SPEC-SCNC: 8.9 MG/DL (ref 8.6–10.5)
CALCIUM SPEC-SCNC: 9.3 MG/DL (ref 8.6–10.5)
CHLORIDE SERPL-SCNC: 102 MMOL/L (ref 98–107)
CHLORIDE SERPL-SCNC: 102 MMOL/L (ref 98–107)
CO2 SERPL-SCNC: 26 MMOL/L (ref 22–29)
CO2 SERPL-SCNC: 26 MMOL/L (ref 22–29)
CREAT SERPL-MCNC: 0.66 MG/DL (ref 0.57–1)
CREAT SERPL-MCNC: 0.8 MG/DL (ref 0.57–1)
DEPRECATED RDW RBC AUTO: 45.9 FL (ref 37–54)
DEPRECATED RDW RBC AUTO: 48.1 FL (ref 37–54)
EGFRCR SERPLBLD CKD-EPI 2021: 102.5 ML/MIN/1.73
EGFRCR SERPLBLD CKD-EPI 2021: 86.1 ML/MIN/1.73
EOSINOPHIL # BLD AUTO: 0 10*3/MM3 (ref 0–0.4)
EOSINOPHIL # BLD AUTO: 0.1 10*3/MM3 (ref 0–0.4)
EOSINOPHIL NFR BLD AUTO: 0.3 % (ref 0.3–6.2)
EOSINOPHIL NFR BLD AUTO: 0.5 % (ref 0.3–6.2)
ERYTHROCYTE [DISTWIDTH] IN BLOOD BY AUTOMATED COUNT: 16.4 % (ref 12.3–15.4)
ERYTHROCYTE [DISTWIDTH] IN BLOOD BY AUTOMATED COUNT: 16.4 % (ref 12.3–15.4)
GLOBULIN UR ELPH-MCNC: 3.6 GM/DL
GLUCOSE BLDC GLUCOMTR-MCNC: 238 MG/DL (ref 70–105)
GLUCOSE SERPL-MCNC: 142 MG/DL (ref 65–99)
GLUCOSE SERPL-MCNC: 193 MG/DL (ref 65–99)
HBA1C MFR BLD: 5.9 % (ref 4.8–5.6)
HCT VFR BLD AUTO: 30.3 % (ref 34–46.6)
HCT VFR BLD AUTO: 31.3 % (ref 34–46.6)
HGB BLD-MCNC: 10 G/DL (ref 12–15.9)
HGB BLD-MCNC: 9.7 G/DL (ref 12–15.9)
INR PPP: 0.99 (ref 0.93–1.1)
LYMPHOCYTES # BLD AUTO: 1.8 10*3/MM3 (ref 0.7–3.1)
LYMPHOCYTES # BLD AUTO: 2.3 10*3/MM3 (ref 0.7–3.1)
LYMPHOCYTES NFR BLD AUTO: 15 % (ref 19.6–45.3)
LYMPHOCYTES NFR BLD AUTO: 17.1 % (ref 19.6–45.3)
MCH RBC QN AUTO: 25.2 PG (ref 26.6–33)
MCH RBC QN AUTO: 26.1 PG (ref 26.6–33)
MCHC RBC AUTO-ENTMCNC: 31.8 G/DL (ref 31.5–35.7)
MCHC RBC AUTO-ENTMCNC: 32.1 G/DL (ref 31.5–35.7)
MCV RBC AUTO: 79.1 FL (ref 79–97)
MCV RBC AUTO: 81.4 FL (ref 79–97)
MONOCYTES # BLD AUTO: 0.7 10*3/MM3 (ref 0.1–0.9)
MONOCYTES # BLD AUTO: 1.1 10*3/MM3 (ref 0.1–0.9)
MONOCYTES NFR BLD AUTO: 5.8 % (ref 5–12)
MONOCYTES NFR BLD AUTO: 7.8 % (ref 5–12)
NEUTROPHILS NFR BLD AUTO: 10 10*3/MM3 (ref 1.7–7)
NEUTROPHILS NFR BLD AUTO: 74.3 % (ref 42.7–76)
NEUTROPHILS NFR BLD AUTO: 78.7 % (ref 42.7–76)
NEUTROPHILS NFR BLD AUTO: 9.3 10*3/MM3 (ref 1.7–7)
NRBC BLD AUTO-RTO: 0 /100 WBC (ref 0–0.2)
NRBC BLD AUTO-RTO: 0 /100 WBC (ref 0–0.2)
PLATELET # BLD AUTO: 241 10*3/MM3 (ref 140–450)
PLATELET # BLD AUTO: 242 10*3/MM3 (ref 140–450)
PMV BLD AUTO: 7.8 FL (ref 6–12)
PMV BLD AUTO: 8.5 FL (ref 6–12)
POTASSIUM SERPL-SCNC: 3.4 MMOL/L (ref 3.5–5.2)
POTASSIUM SERPL-SCNC: 3.7 MMOL/L (ref 3.5–5.2)
PROT SERPL-MCNC: 7.4 G/DL (ref 6–8.5)
PROTHROMBIN TIME: 10.8 SECONDS (ref 9.6–11.7)
RBC # BLD AUTO: 3.73 10*6/MM3 (ref 3.77–5.28)
RBC # BLD AUTO: 3.96 10*6/MM3 (ref 3.77–5.28)
SODIUM SERPL-SCNC: 138 MMOL/L (ref 136–145)
SODIUM SERPL-SCNC: 139 MMOL/L (ref 136–145)
WBC NRBC COR # BLD AUTO: 11.9 10*3/MM3 (ref 3.4–10.8)
WBC NRBC COR # BLD AUTO: 13.5 10*3/MM3 (ref 3.4–10.8)

## 2024-02-16 PROCEDURE — 25010000002 VANCOMYCIN HCL IN NACL 1.5-0.9 GM/500ML-% SOLUTION: Performed by: INTERNAL MEDICINE

## 2024-02-16 PROCEDURE — 96365 THER/PROPH/DIAG IV INF INIT: CPT

## 2024-02-16 PROCEDURE — 25810000003 SODIUM CHLORIDE 0.9 % SOLUTION: Performed by: INTERNAL MEDICINE

## 2024-02-16 PROCEDURE — 83036 HEMOGLOBIN GLYCOSYLATED A1C: CPT | Performed by: INTERNAL MEDICINE

## 2024-02-16 PROCEDURE — 74177 CT ABD & PELVIS W/CONTRAST: CPT

## 2024-02-16 PROCEDURE — 85610 PROTHROMBIN TIME: CPT | Performed by: INTERNAL MEDICINE

## 2024-02-16 PROCEDURE — 80053 COMPREHEN METABOLIC PANEL: CPT | Performed by: EMERGENCY MEDICINE

## 2024-02-16 PROCEDURE — 25510000001 IOPAMIDOL PER 1 ML: Performed by: EMERGENCY MEDICINE

## 2024-02-16 PROCEDURE — 85025 COMPLETE CBC W/AUTO DIFF WBC: CPT | Performed by: INTERNAL MEDICINE

## 2024-02-16 PROCEDURE — 25010000002 AMPICILLIN-SULBACTAM PER 1.5 G: Performed by: EMERGENCY MEDICINE

## 2024-02-16 PROCEDURE — 25010000002 AMPICILLIN-SULBACTAM PER 1.5 G: Performed by: INTERNAL MEDICINE

## 2024-02-16 PROCEDURE — 36415 COLL VENOUS BLD VENIPUNCTURE: CPT | Performed by: INTERNAL MEDICINE

## 2024-02-16 PROCEDURE — 36415 COLL VENOUS BLD VENIPUNCTURE: CPT

## 2024-02-16 PROCEDURE — 99285 EMERGENCY DEPT VISIT HI MDM: CPT

## 2024-02-16 PROCEDURE — 96375 TX/PRO/DX INJ NEW DRUG ADDON: CPT

## 2024-02-16 PROCEDURE — 82948 REAGENT STRIP/BLOOD GLUCOSE: CPT

## 2024-02-16 PROCEDURE — 87040 BLOOD CULTURE FOR BACTERIA: CPT | Performed by: EMERGENCY MEDICINE

## 2024-02-16 PROCEDURE — 85025 COMPLETE CBC W/AUTO DIFF WBC: CPT | Performed by: EMERGENCY MEDICINE

## 2024-02-16 PROCEDURE — 25810000003 SODIUM CHLORIDE 0.9 % SOLUTION: Performed by: EMERGENCY MEDICINE

## 2024-02-16 PROCEDURE — 25010000002 ONDANSETRON PER 1 MG: Performed by: EMERGENCY MEDICINE

## 2024-02-16 PROCEDURE — 0 CLINDAMYCIN PER 300 MG: Performed by: INTERNAL MEDICINE

## 2024-02-16 PROCEDURE — 25010000002 HYDROMORPHONE 1 MG/ML SOLUTION: Performed by: EMERGENCY MEDICINE

## 2024-02-16 RX ORDER — POLYETHYLENE GLYCOL 3350 17 G/17G
17 POWDER, FOR SOLUTION ORAL DAILY PRN
Status: DISCONTINUED | OUTPATIENT
Start: 2024-02-16 | End: 2024-02-17 | Stop reason: HOSPADM

## 2024-02-16 RX ORDER — AMOXICILLIN 250 MG
2 CAPSULE ORAL 2 TIMES DAILY PRN
Status: DISCONTINUED | OUTPATIENT
Start: 2024-02-16 | End: 2024-02-17 | Stop reason: HOSPADM

## 2024-02-16 RX ORDER — ACETAMINOPHEN 325 MG/1
650 TABLET ORAL EVERY 4 HOURS PRN
Status: DISCONTINUED | OUTPATIENT
Start: 2024-02-16 | End: 2024-02-17 | Stop reason: HOSPADM

## 2024-02-16 RX ORDER — SODIUM CHLORIDE 9 MG/ML
40 INJECTION, SOLUTION INTRAVENOUS AS NEEDED
Status: DISCONTINUED | OUTPATIENT
Start: 2024-02-16 | End: 2024-02-17 | Stop reason: HOSPADM

## 2024-02-16 RX ORDER — NICOTINE POLACRILEX 4 MG
15 LOZENGE BUCCAL
Status: DISCONTINUED | OUTPATIENT
Start: 2024-02-16 | End: 2024-02-17 | Stop reason: HOSPADM

## 2024-02-16 RX ORDER — B-COMPLEX WITH VITAMIN C
100 TABLET ORAL DAILY
COMMUNITY

## 2024-02-16 RX ORDER — SODIUM PHOSPHATE,MONO-DIBASIC 19G-7G/118
1 ENEMA (ML) RECTAL DAILY
COMMUNITY

## 2024-02-16 RX ORDER — SODIUM CHLORIDE 9 MG/ML
100 INJECTION, SOLUTION INTRAVENOUS CONTINUOUS
Status: DISCONTINUED | OUTPATIENT
Start: 2024-02-16 | End: 2024-02-17 | Stop reason: HOSPADM

## 2024-02-16 RX ORDER — INSULIN LISPRO 100 [IU]/ML
3-14 INJECTION, SOLUTION INTRAVENOUS; SUBCUTANEOUS
Status: DISCONTINUED | OUTPATIENT
Start: 2024-02-16 | End: 2024-02-17 | Stop reason: HOSPADM

## 2024-02-16 RX ORDER — SODIUM CHLORIDE 0.9 % (FLUSH) 0.9 %
10 SYRINGE (ML) INJECTION AS NEEDED
Status: DISCONTINUED | OUTPATIENT
Start: 2024-02-16 | End: 2024-02-17 | Stop reason: HOSPADM

## 2024-02-16 RX ORDER — ERGOCALCIFEROL (VITAMIN D2) 10 MCG
400 TABLET ORAL DAILY
COMMUNITY

## 2024-02-16 RX ORDER — ACETAMINOPHEN 650 MG/1
650 SUPPOSITORY RECTAL EVERY 4 HOURS PRN
Status: DISCONTINUED | OUTPATIENT
Start: 2024-02-16 | End: 2024-02-17 | Stop reason: HOSPADM

## 2024-02-16 RX ORDER — PAROXETINE 10 MG/1
10 TABLET, FILM COATED ORAL DAILY
Status: DISCONTINUED | OUTPATIENT
Start: 2024-02-17 | End: 2024-02-17 | Stop reason: HOSPADM

## 2024-02-16 RX ORDER — ATORVASTATIN CALCIUM 20 MG/1
20 TABLET, FILM COATED ORAL NIGHTLY
Status: DISCONTINUED | OUTPATIENT
Start: 2024-02-16 | End: 2024-02-17 | Stop reason: HOSPADM

## 2024-02-16 RX ORDER — IBUPROFEN 200 MG
200 TABLET ORAL EVERY 6 HOURS PRN
COMMUNITY

## 2024-02-16 RX ORDER — ONDANSETRON 2 MG/ML
4 INJECTION INTRAMUSCULAR; INTRAVENOUS EVERY 6 HOURS PRN
Status: DISCONTINUED | OUTPATIENT
Start: 2024-02-16 | End: 2024-02-17 | Stop reason: HOSPADM

## 2024-02-16 RX ORDER — HYDROCODONE BITARTRATE AND ACETAMINOPHEN 5; 325 MG/1; MG/1
1 TABLET ORAL EVERY 6 HOURS PRN
Status: DISCONTINUED | OUTPATIENT
Start: 2024-02-16 | End: 2024-02-17 | Stop reason: HOSPADM

## 2024-02-16 RX ORDER — ACETAMINOPHEN 160 MG/5ML
650 SOLUTION ORAL EVERY 4 HOURS PRN
Status: DISCONTINUED | OUTPATIENT
Start: 2024-02-16 | End: 2024-02-17 | Stop reason: HOSPADM

## 2024-02-16 RX ORDER — LORATADINE 10 MG/1
10 TABLET ORAL DAILY
COMMUNITY

## 2024-02-16 RX ORDER — SODIUM CHLORIDE 0.9 % (FLUSH) 0.9 %
10 SYRINGE (ML) INJECTION EVERY 12 HOURS SCHEDULED
Status: DISCONTINUED | OUTPATIENT
Start: 2024-02-16 | End: 2024-02-17 | Stop reason: HOSPADM

## 2024-02-16 RX ORDER — LISINOPRIL 20 MG/1
20 TABLET ORAL DAILY
Status: DISCONTINUED | OUTPATIENT
Start: 2024-02-17 | End: 2024-02-17 | Stop reason: HOSPADM

## 2024-02-16 RX ORDER — VANCOMYCIN/0.9 % SOD CHLORIDE 1.5G/250ML
20 PLASTIC BAG, INJECTION (ML) INTRAVENOUS ONCE
Status: COMPLETED | OUTPATIENT
Start: 2024-02-16 | End: 2024-02-16

## 2024-02-16 RX ORDER — ONDANSETRON 4 MG/1
4 TABLET, ORALLY DISINTEGRATING ORAL EVERY 6 HOURS PRN
Status: DISCONTINUED | OUTPATIENT
Start: 2024-02-16 | End: 2024-02-17 | Stop reason: HOSPADM

## 2024-02-16 RX ORDER — IBUPROFEN 600 MG/1
1 TABLET ORAL
Status: DISCONTINUED | OUTPATIENT
Start: 2024-02-16 | End: 2024-02-17 | Stop reason: HOSPADM

## 2024-02-16 RX ORDER — BISACODYL 5 MG/1
5 TABLET, DELAYED RELEASE ORAL DAILY PRN
Status: DISCONTINUED | OUTPATIENT
Start: 2024-02-16 | End: 2024-02-17 | Stop reason: HOSPADM

## 2024-02-16 RX ORDER — FERROUS SULFATE 325(65) MG
325 TABLET ORAL
COMMUNITY

## 2024-02-16 RX ORDER — CHLORAL HYDRATE 500 MG
1000 CAPSULE ORAL
COMMUNITY

## 2024-02-16 RX ORDER — DEXTROSE MONOHYDRATE 25 G/50ML
25 INJECTION, SOLUTION INTRAVENOUS
Status: DISCONTINUED | OUTPATIENT
Start: 2024-02-16 | End: 2024-02-17 | Stop reason: HOSPADM

## 2024-02-16 RX ORDER — CLINDAMYCIN PHOSPHATE 600 MG/50ML
600 INJECTION, SOLUTION INTRAVENOUS EVERY 8 HOURS
Qty: 1050 ML | Refills: 0 | Status: DISCONTINUED | OUTPATIENT
Start: 2024-02-16 | End: 2024-02-17 | Stop reason: HOSPADM

## 2024-02-16 RX ORDER — ONDANSETRON 2 MG/ML
4 INJECTION INTRAMUSCULAR; INTRAVENOUS ONCE
Status: COMPLETED | OUTPATIENT
Start: 2024-02-16 | End: 2024-02-16

## 2024-02-16 RX ORDER — BISACODYL 10 MG
10 SUPPOSITORY, RECTAL RECTAL DAILY PRN
Status: DISCONTINUED | OUTPATIENT
Start: 2024-02-16 | End: 2024-02-17 | Stop reason: HOSPADM

## 2024-02-16 RX ADMIN — AMPICILLIN SODIUM AND SULBACTAM SODIUM 3 G: 2; 1 INJECTION, POWDER, FOR SOLUTION INTRAMUSCULAR; INTRAVENOUS at 17:48

## 2024-02-16 RX ADMIN — ATORVASTATIN CALCIUM 20 MG: 20 TABLET, FILM COATED ORAL at 21:18

## 2024-02-16 RX ADMIN — Medication 1500 MG: at 19:32

## 2024-02-16 RX ADMIN — HYDROCODONE BITARTRATE AND ACETAMINOPHEN 1 TABLET: 5; 325 TABLET ORAL at 19:41

## 2024-02-16 RX ADMIN — CLINDAMYCIN PHOSPHATE 600 MG: 600 INJECTION, SOLUTION INTRAVENOUS at 21:38

## 2024-02-16 RX ADMIN — IOPAMIDOL 100 ML: 755 INJECTION, SOLUTION INTRAVENOUS at 15:52

## 2024-02-16 RX ADMIN — Medication 10 ML: at 21:04

## 2024-02-16 RX ADMIN — SODIUM CHLORIDE 100 ML/HR: 9 INJECTION, SOLUTION INTRAVENOUS at 19:32

## 2024-02-16 RX ADMIN — HYDROMORPHONE HYDROCHLORIDE 0.5 MG: 1 INJECTION, SOLUTION INTRAMUSCULAR; INTRAVENOUS; SUBCUTANEOUS at 14:24

## 2024-02-16 RX ADMIN — ONDANSETRON 4 MG: 2 INJECTION INTRAMUSCULAR; INTRAVENOUS at 14:24

## 2024-02-16 RX ADMIN — SODIUM CHLORIDE 1000 ML: 9 INJECTION, SOLUTION INTRAVENOUS at 14:24

## 2024-02-16 NOTE — Clinical Note
Level of Care: Telemetry [5]   Diagnosis: Perianal abscess [[id number]]   Admitting Physician: AILYN HOYOS [[id number]]   Attending Physician: AILYN HOYOS [[id number]]

## 2024-02-16 NOTE — H&P
Hospitalist Service  History and Physical      Patient Name: Gena Harper  : 1965  MRN: [id number]  Primary Care Physician:  Day Cardoso APRN  Date of admission: 2024      Subjective      Chief Complaint: rectal pain    History of Present Illness: Gena Harper is a 58 y.o. female with past medical history of DM2, hypertension who presented to [facility] on 2024 complaining of rectal pain since Tuesday.  Patient states she was feeling congested with mild headache and had some chills.  She started noticing swelling and pain around the anal area afterwards.  Thought again she was still having fever secondary to the URI however swelling started getting worse with more fevers chills.  She went to urgent care and was told to come to the ER for perirectal abscess.  CT abdomen pelvis in the ER showing perianal abscess, general surgery was consulted for drainage tomorrow.  Patient denies any associated abdominal pain, nausea, vomiting, diarrhea, dysuria, chest pain, shortness of breath.  Does admit to being diabetic for past few years, was never on insulin before.  Does not check blood glucose at home regularly.      ROS: Pertinent positives as noted in HPI/subjective.  All other systems were reviewed and are negative.      Personal History     Past Medical History:   Diagnosis Date    Cataract     Implants on 2/15/17 & 17    Delivery of      Abstraction from [facility] Childbirth x 3    Hypertension        Past Surgical History:   Procedure Laterality Date    BREAST BIOPSY Right 2018    core    CATARACT EXTRACTION Right     MOUTH SURGERY      Top 2 molar/oral surgery       Family History: family history includes Breast cancer in her maternal grandmother; Breast cancer (age of onset: 44) in her sister; Cancer in her sister; Diabetes in her maternal grandmother; Heart disease in her father; Kidney disease in her father. Otherwise pertinent FHx was reviewed and not  pertinent to current issue.    Social History:  reports that she has never smoked. She has never been exposed to tobacco smoke. She has never used smokeless tobacco. She reports current alcohol use of about 4.0 standard drinks of alcohol per week. She reports that she does not use drugs.    Home Medications:  Prior to Admission Medications       Prescriptions Last Dose Informant Patient Reported? Taking?    atorvastatin (LIPITOR) 20 MG tablet   No No    Take 1 tablet by mouth Daily.    glucose blood (FREESTYLE TEST STRIPS) test strip   No No    Use to check blood glucose daily and as needed DX: E11.65    glucose monitoring kit (FREESTYLE) monitoring kit   No No    Please provide glucometer on patient's formulary to check blood glucose daily and as needed. DX: E11.65    Lancets (freestyle) lancets   No No    Use to check blood glucose daily and as needed DX: E11.65    lisinopril (PRINIVIL,ZESTRIL) 20 MG tablet   No No    Take 1 tablet by mouth Daily.    PARoxetine (PAXIL) 10 MG tablet   No No    Take 1 tablet by mouth Daily.    Rybelsus 7 MG tablet   No No    TAKE 1 TABLET BY MOUTH EVERY MORNING BEFORE BREAKFAST              I have utilized all available, immediate resources to obtain, update, or review the patient's current medications including all prescriptions, over-the-counter products, herbals, cannabis/cannabidiol products, and vitamin.mineral/dietary (nutritional) supplements.    Allergies:  No Known Allergies    Objective      Vitals:   Temp:  [98.5 °F (36.9 °C)-98.7 °F (37.1 °C)] 98.7 °F (37.1 °C)  Heart Rate:  [] 104  Resp:  [18-20] 18  BP: (129-160)/(79-84) 160/84    Physical Exam:    General: Awake, alert, NAD  HEENT: NC/AT, PERRL, EOMI, conjunctivae are clear, mucous membrane moist, oropharynx clear, Trachea midline   Cardiovascular: Regular rate and rhythm, no murmurs  Respiratory: Clear to auscultation bilaterally, no wheezing or rales, unlabored breathing  Abdomen: Soft, nontender, positive  bowel sounds, no guarding  Neurologic: A&O, CN grossly intact, moves all extremities spontaneously  Musculoskeletal: Normal range of motion, no other gross deformities  Skin: Warm, dry, right cheek perianal swelling with surrounding erythema, no active drainage, mildly tender to palpation      Result Review    Result Review:  I have personally reviewed the results from the time of this admission to 2/16/2024 18:00 EST and agree with these findings:  [x]  Laboratory  [x]  Microbiology  [x]  Radiology  []  EKG/Telemetry   []  Cardiology/Vascular   []  Pathology  []  Old records  []  Other:      Assessment & Plan        Active Hospital Problems:  Active Hospital Problems    Diagnosis     **Perianal abscess        Assessment/Plan:     Perianal abscess  -CT abdomen pelvis findings noted  -Concern for gas-forming bacteria as well  -Continue Unasyn, vancomycin, add clindamycin  -General surgery consulted in ER and plan for drainage tomorrow  -N.p.o. past midnight    DM2   -A1c ordered, patient does not regularly check glucose at home  -Continue SSI, monitor and assess need    Hypertension   hyperlipidemia   chronic anxiety  -Continue home meds, monitor    DVT prophylaxis:  Mechanical DVT prophylaxis orders are present.        Differential diagnosis including as mentioned above in A/P. Discussed the case in detail with the patient/family who also stated the patient has above. Discussed the case in details with consultants who agreed with the current diagnosis of Perianal abscess and agreed with the treatment plan for this. I reviewed the data independently as mentioned in my MDM. Shared decision making completed with the clinician and patient regarding risk and benefits of treatment plan and patient agreed to proceed with the above mentioned treatment plan.    CODE STATUS:    Code Status (Patient has no pulse and is not breathing): CPR (Attempt to Resuscitate)  Medical Interventions (Patient has pulse or is breathing): Full  Support      Admission Status:  I believe this patient meets obs status.    Signature:   Electronically signed by Winifred Chin DO, 02/16/24, 6:00 PM EST.    Part of this note may be an electronic transcription/translation of spoken language to printed text using the Dragon Dictation System.

## 2024-02-16 NOTE — ED PROVIDER NOTES
Subjective   History of Present Illness  58-year-old female reports a 3-day history of increasing perirectal pain.  She states this mostly on the right.  She states that she has had subjective fever and shaking chills in the last 24 hours  Reports that she has had some nausea but no vomiting or diarrhea.  She denies dysuria or hematuria.  She reports no recent melena hematemesis hematochezia and has not been formally diagnosed as having diverticulosis or diverticulitis    Review of Systems   Constitutional:  Positive for chills, fatigue and fever. Negative for unexpected weight change.   Gastrointestinal:  Positive for abdominal pain, nausea and rectal pain.   Genitourinary:  Positive for decreased urine volume.   Hematological:  Does not bruise/bleed easily.   All other systems reviewed and are negative.      Past Medical History:   Diagnosis Date    Cataract     Implants on 2/15/17 & 17    Delivery of      Abstraction from [facility] Childbirth x 3    Hypertension        No Known Allergies    Past Surgical History:   Procedure Laterality Date    BREAST BIOPSY Right 2018    core    CATARACT EXTRACTION Right     MOUTH SURGERY      Top 2 molar/oral surgery       Family History   Problem Relation Age of Onset    Heart disease Father     Kidney disease Father     Diabetes Maternal Grandmother     Breast cancer Maternal Grandmother     Breast cancer Sister 44         at 48    Cancer Sister         Breast cancer -  2012       Social History     Socioeconomic History    Marital status:    Tobacco Use    Smoking status: Never     Passive exposure: Never    Smokeless tobacco: Never   Vaping Use    Vaping Use: Never used   Substance and Sexual Activity    Alcohol use: Yes     Alcohol/week: 4.0 standard drinks of alcohol     Types: 2 Glasses of wine, 2 Shots of liquor per week     Comment: occ    Drug use: Never    Sexual activity: Yes     Partners: Male     No recent unusual food  water travel or activity      Objective   Physical Exam  Alert Cherelle Coma Scale 15   HEENT: Pupils equal and reactive to light. Conjunctivae are not injected. Normal tympanic membranes. Oropharynx and nares are normal.   Neck: Supple. Midline trachea. No JVD. No goiter.   Chest: Clear and equal breath sounds bilaterally, regular rate and rhythm without murmur or rub.   Abdomen: Positive bowel sounds, nontender, nondistended. No rebound or peritoneal signs. No CVA tenderness.  There is a 7 to 8 cm area of induration on the proximal right gluteal cleft that extends up to the anal verge.  The patient has nonthrombosed external hemorrhoids that do not appear to have been recently bleeding.  There is some lymphangitic extension beyond the indurated area  Extremities no clubbing. cyanosis or edema. Motor sensory exam is normal. The full range of motion is intact   Skin: Warm and dry, no rashes or petechia.   Lymphatic: No regional lymphadenopathy. No calf pain, swelling or Homans sign    Procedures           ED Course                         Labs Reviewed   COMPREHENSIVE METABOLIC PANEL - Abnormal; Notable for the following components:       Result Value    Glucose 142 (*)     All other components within normal limits    Narrative:     GFR Normal >60  Chronic Kidney Disease <60  Kidney Failure <15     CBC WITH AUTO DIFFERENTIAL - Abnormal; Notable for the following components:    WBC 11.90 (*)     Hemoglobin 10.0 (*)     Hematocrit 31.3 (*)     MCH 25.2 (*)     RDW 16.4 (*)     Neutrophil % 78.7 (*)     Lymphocyte % 15.0 (*)     Neutrophils, Absolute 9.30 (*)     All other components within normal limits   BLOOD CULTURE   BLOOD CULTURE   CBC AND DIFFERENTIAL    Narrative:     The following orders were created for panel order CBC & Differential.  Procedure                               Abnormality         Status                     ---------                               -----------         ------                     CBC  Auto Differential[[id number]]        Abnormal            Final result                 Please view results for these tests on the individual orders.     Medications   ampicillin-sulbactam (UNASYN) 3 g in sodium chloride 0.9 % 100 mL IVPB-MBP (has no administration in time range)   Pharmacy to dose vancomycin (has no administration in time range)   atorvastatin (LIPITOR) tablet 20 mg (has no administration in time range)   lisinopril (PRINIVIL,ZESTRIL) tablet 20 mg (has no administration in time range)   PARoxetine (PAXIL) tablet 10 mg (has no administration in time range)   sodium chloride 0.9 % flush 10 mL (has no administration in time range)   sodium chloride 0.9 % flush 10 mL (has no administration in time range)   sodium chloride 0.9 % infusion 40 mL (has no administration in time range)   ondansetron ODT (ZOFRAN-ODT) disintegrating tablet 4 mg (has no administration in time range)     Or   ondansetron (ZOFRAN) injection 4 mg (has no administration in time range)   Potassium Replacement - Follow Nurse / BPA Driven Protocol (has no administration in time range)   Magnesium Standard Dose Replacement - Follow Nurse / BPA Driven Protocol (has no administration in time range)   Phosphorus Replacement - Follow Nurse / BPA Driven Protocol (has no administration in time range)   Calcium Replacement - Follow Nurse / BPA Driven Protocol (has no administration in time range)   sodium chloride 0.9 % infusion (has no administration in time range)   sennosides-docusate (PERICOLACE) 8.6-50 MG per tablet 2 tablet (has no administration in time range)     And   polyethylene glycol (MIRALAX) packet 17 g (has no administration in time range)     And   bisacodyl (DULCOLAX) EC tablet 5 mg (has no administration in time range)     And   bisacodyl (DULCOLAX) suppository 10 mg (has no administration in time range)   acetaminophen (TYLENOL) tablet 650 mg (has no administration in time range)     Or   acetaminophen (TYLENOL) 160 MG/5ML  oral solution 650 mg (has no administration in time range)     Or   acetaminophen (TYLENOL) suppository 650 mg (has no administration in time range)   HYDROcodone-acetaminophen (NORCO) 5-325 MG per tablet 1 tablet (has no administration in time range)   sodium chloride 0.9 % bolus 1,000 mL (1,000 mL Intravenous New Bag 2/16/24 1424)   ondansetron (ZOFRAN) injection 4 mg (4 mg Intravenous Given 2/16/24 1424)   HYDROmorphone (DILAUDID) injection 0.5 mg (0.5 mg Intravenous Given 2/16/24 1424)   iopamidol (ISOVUE-370) 76 % injection 100 mL (100 mL Intravenous Given 2/16/24 1552)     CT Abdomen Pelvis With Contrast    Result Date: 2/16/2024  Impression: Within the right medial aspect of the pelvic soft tissue/medial thigh adjacent to the anus there is a 7 cm fluid collection containing a small locule of gas. This most likely represents a perianal abscess. Infection with a gas-forming organism is not completely excluded. Incidentally noted 1.8 cm nodule within the right lower lobe with a small focal calcification. While this may represent a developing calcified granuloma, this may also represent a discrete pulmonary nodule. Please consider follow-up with dedicated PET/CT  or tissue sampling. Alternatively a repeat chest CT in 3 months can be obtained. Heterogeneous and enlarged uterus with a possible 5.6 cm mass within the uterine fundus. This most likely represents a uterine fibroid. Electronically Signed: Jose Ly DO  2/16/2024 4:03 PM EST  Workstation ID: [id number]                         Medical Decision Making  This was discussed with surgery and hospitalist services.  The patient was started on IV antibiotics, Unasyn and vancomycin, pain and nausea were relieved the emergency department the patient will be admitted for definitive surgical evaluation she was agreeable to this plan of treatment    Amount and/or Complexity of Data Reviewed  Labs: ordered. Decision-making details documented in ED  Course.  Radiology: ordered and independent interpretation performed.    Risk  OTC drugs.  Prescription drug management.  Parenteral controlled substances.  Decision regarding hospitalization.        Final diagnoses:   Perianal abscess   Intramural leiomyoma of uterus   Rectal or anal pain       ED Disposition  ED Disposition       ED Disposition   Decision to Admit    Condition   --    Comment   Level of Care: Telemetry [5]   Diagnosis: Perianal abscess [248668]   Admitting Physician: AILYN HOYOS [[id number]]   Attending Physician: AILYN HOYOS [[id number]]                 No follow-up provider specified.       Medication List      No changes were made to your prescriptions during this visit.            Nahid Tillman MD  02/16/24 [id number]

## 2024-02-17 ENCOUNTER — ANESTHESIA EVENT (OUTPATIENT)
Dept: MEDSURG UNIT | Facility: HOSPITAL | Age: 59
End: 2024-02-17

## 2024-02-17 ENCOUNTER — ANESTHESIA (OUTPATIENT)
Dept: MEDSURG UNIT | Facility: HOSPITAL | Age: 59
End: 2024-02-17

## 2024-02-17 ENCOUNTER — READMISSION MANAGEMENT (OUTPATIENT)
Dept: CALL CENTER | Facility: HOSPITAL | Age: 59
End: 2024-02-17
Payer: COMMERCIAL

## 2024-02-17 VITALS
BODY MASS INDEX: 39.75 KG/M2 | SYSTOLIC BLOOD PRESSURE: 124 MMHG | TEMPERATURE: 98.4 F | WEIGHT: 232.8 LBS | HEART RATE: 103 BPM | HEIGHT: 64 IN | RESPIRATION RATE: 16 BRPM | DIASTOLIC BLOOD PRESSURE: 86 MMHG | OXYGEN SATURATION: 93 %

## 2024-02-17 LAB
GLUCOSE BLDC GLUCOMTR-MCNC: 132 MG/DL (ref 70–105)
GLUCOSE BLDC GLUCOMTR-MCNC: 153 MG/DL (ref 70–105)
GLUCOSE BLDC GLUCOMTR-MCNC: 91 MG/DL (ref 70–105)
POTASSIUM SERPL-SCNC: 4.3 MMOL/L (ref 3.5–5.2)
VANCOMYCIN PEAK SERPL-MCNC: 30.5 MCG/ML (ref 20–40)

## 2024-02-17 PROCEDURE — 99222 1ST HOSP IP/OBS MODERATE 55: CPT | Performed by: STUDENT IN AN ORGANIZED HEALTH CARE EDUCATION/TRAINING PROGRAM

## 2024-02-17 PROCEDURE — 46050 I&D PERIANAL ABSCESS SUPFC: CPT | Performed by: STUDENT IN AN ORGANIZED HEALTH CARE EDUCATION/TRAINING PROGRAM

## 2024-02-17 PROCEDURE — 25810000003 SODIUM CHLORIDE 0.9 % SOLUTION 250 ML FLEX CONT: Performed by: EMERGENCY MEDICINE

## 2024-02-17 PROCEDURE — 80202 ASSAY OF VANCOMYCIN: CPT | Performed by: EMERGENCY MEDICINE

## 2024-02-17 PROCEDURE — 82948 REAGENT STRIP/BLOOD GLUCOSE: CPT | Performed by: INTERNAL MEDICINE

## 2024-02-17 PROCEDURE — 82948 REAGENT STRIP/BLOOD GLUCOSE: CPT

## 2024-02-17 PROCEDURE — 25010000002 AMPICILLIN-SULBACTAM PER 1.5 G: Performed by: INTERNAL MEDICINE

## 2024-02-17 PROCEDURE — 25010000002 HYDROMORPHONE 1 MG/ML SOLUTION: Performed by: STUDENT IN AN ORGANIZED HEALTH CARE EDUCATION/TRAINING PROGRAM

## 2024-02-17 PROCEDURE — 84132 ASSAY OF SERUM POTASSIUM: CPT | Performed by: INTERNAL MEDICINE

## 2024-02-17 PROCEDURE — 0 CLINDAMYCIN PER 300 MG: Performed by: INTERNAL MEDICINE

## 2024-02-17 PROCEDURE — 0J9B0ZZ DRAINAGE OF PERINEUM SUBCUTANEOUS TISSUE AND FASCIA, OPEN APPROACH: ICD-10-PCS | Performed by: STUDENT IN AN ORGANIZED HEALTH CARE EDUCATION/TRAINING PROGRAM

## 2024-02-17 PROCEDURE — 25010000002 AMPICILLIN-SULBACTAM PER 1.5 G: Performed by: EMERGENCY MEDICINE

## 2024-02-17 PROCEDURE — 25010000002 VANCOMYCIN HCL 1.25 G RECONSTITUTED SOLUTION 1 EACH VIAL: Performed by: EMERGENCY MEDICINE

## 2024-02-17 PROCEDURE — 96376 TX/PRO/DX INJ SAME DRUG ADON: CPT

## 2024-02-17 RX ORDER — HYDROCODONE BITARTRATE AND ACETAMINOPHEN 5; 325 MG/1; MG/1
1 TABLET ORAL EVERY 12 HOURS PRN
Qty: 6 TABLET | Refills: 0 | Status: SHIPPED | OUTPATIENT
Start: 2024-02-17

## 2024-02-17 RX ORDER — POLYETHYLENE GLYCOL 3350 17 G/17G
17 POWDER, FOR SOLUTION ORAL DAILY PRN
Qty: 14 EACH | Refills: 0 | Status: SHIPPED | OUTPATIENT
Start: 2024-02-17 | End: 2024-03-02

## 2024-02-17 RX ORDER — POTASSIUM CHLORIDE 20 MEQ/1
40 TABLET, EXTENDED RELEASE ORAL EVERY 4 HOURS
Status: COMPLETED | OUTPATIENT
Start: 2024-02-17 | End: 2024-02-17

## 2024-02-17 RX ORDER — AMOXICILLIN AND CLAVULANATE POTASSIUM 875; 125 MG/1; MG/1
1 TABLET, FILM COATED ORAL 2 TIMES DAILY
Qty: 20 TABLET | Refills: 0 | Status: SHIPPED | OUTPATIENT
Start: 2024-02-17 | End: 2024-02-27

## 2024-02-17 RX ORDER — AMOXICILLIN AND CLAVULANATE POTASSIUM 875; 125 MG/1; MG/1
1 TABLET, FILM COATED ORAL 2 TIMES DAILY
Qty: 20 TABLET | Refills: 0 | Status: SHIPPED | OUTPATIENT
Start: 2024-02-17 | End: 2024-02-17 | Stop reason: SDUPTHER

## 2024-02-17 RX ORDER — LIDOCAINE HYDROCHLORIDE 10 MG/ML
60 INJECTION, SOLUTION INFILTRATION; PERINEURAL ONCE
Status: DISCONTINUED | OUTPATIENT
Start: 2024-02-17 | End: 2024-02-17 | Stop reason: HOSPADM

## 2024-02-17 RX ORDER — ALPRAZOLAM 0.5 MG/1
0.5 TABLET ORAL ONCE
Status: COMPLETED | OUTPATIENT
Start: 2024-02-17 | End: 2024-02-17

## 2024-02-17 RX ADMIN — VANCOMYCIN HYDROCHLORIDE 1250 MG: 1.25 INJECTION, POWDER, LYOPHILIZED, FOR SOLUTION INTRAVENOUS at 10:42

## 2024-02-17 RX ADMIN — CLINDAMYCIN PHOSPHATE 600 MG: 600 INJECTION, SOLUTION INTRAVENOUS at 13:17

## 2024-02-17 RX ADMIN — AMPICILLIN SODIUM AND SULBACTAM SODIUM 3 G: 2; 1 INJECTION, POWDER, FOR SOLUTION INTRAMUSCULAR; INTRAVENOUS at 12:34

## 2024-02-17 RX ADMIN — HYDROMORPHONE HYDROCHLORIDE 1 MG: 1 INJECTION, SOLUTION INTRAMUSCULAR; INTRAVENOUS; SUBCUTANEOUS at 14:58

## 2024-02-17 RX ADMIN — HYDROCODONE BITARTRATE AND ACETAMINOPHEN 1 TABLET: 5; 325 TABLET ORAL at 01:39

## 2024-02-17 RX ADMIN — AMPICILLIN SODIUM AND SULBACTAM SODIUM 3 G: 2; 1 INJECTION, POWDER, FOR SOLUTION INTRAMUSCULAR; INTRAVENOUS at 00:39

## 2024-02-17 RX ADMIN — CLINDAMYCIN PHOSPHATE 600 MG: 600 INJECTION, SOLUTION INTRAVENOUS at 06:16

## 2024-02-17 RX ADMIN — POTASSIUM CHLORIDE 40 MEQ: 1500 TABLET, EXTENDED RELEASE ORAL at 05:25

## 2024-02-17 RX ADMIN — Medication 10 ML: at 10:35

## 2024-02-17 RX ADMIN — AMPICILLIN SODIUM AND SULBACTAM SODIUM 3 G: 2; 1 INJECTION, POWDER, FOR SOLUTION INTRAMUSCULAR; INTRAVENOUS at 05:27

## 2024-02-17 RX ADMIN — POTASSIUM CHLORIDE 40 MEQ: 1500 TABLET, EXTENDED RELEASE ORAL at 01:39

## 2024-02-17 RX ADMIN — ALPRAZOLAM 0.5 MG: 0.5 TABLET ORAL at 14:58

## 2024-02-17 NOTE — OP NOTE
CRS Operative Note   Name: Gena Harper  : 1965  Date of Surgery: 2024  Pre-op Diagnosis: Anal abscess  Post-op Diagnosis: same  Procedure: Incision and drainage of abscess  Surgeon: Javier Will MD   Assistants: N/A  Anesthesia:local   Estimated Blood Loss: minimal  Drains: none   Implants: none  Specimen: none   Findings   1. Ischiorectal abscess  Complications   No complications  Indication  58 y.o. female with perianal abscess     Description of Procedure  Patient was placed in prone position. Right gluteus and perianal area cleaned with betadine solution.     A local anal block was performed.    On inspection there was an indurated and fluctuant the right lateral. This area was opened with scalpel and a significant quantity of pus was expressed. Hemostasis was obtained.    Dressings were applied and the case was concluded.

## 2024-02-17 NOTE — PLAN OF CARE
Goal Outcome Evaluation:      Pt arrived to unit and assisted into bed.  Spouse at bedside.  Pt instructed to use call bell to call for assistance.  Call bell within pt's reach.

## 2024-02-17 NOTE — PROGRESS NOTES
Abscess drained at bedside.   Ok for diabetic diet and discharge to home.   Instruction give.  Follow up in clinic in 4 weeks time.   Augmentin sent to pharmacy.

## 2024-02-17 NOTE — DISCHARGE INSTRUCTIONS
[facility]  Postoperative Instructions after Anorectal Surgery    After your surgery, the following may occur and SHOULD NOT alarm you:   a) Drainage of bloody discharge (a tablespoonful or less)   b) Some swelling around the anus   c) Soreness, burning, itching, or dull aching   d) Pain with bowel movement   e) Occasional passage of bright red blood   f) Mild fatigue   g) Mild fever (less than 101.4)   h) odor.    Diet:   1. Eat a regular high fiber diet with a considerable amount of cooked vegetables, fruits and fruit juices.  2. Avoid spicy foods.  3. Drink greater than 64 ounces of water or liquids without caffeine daily.    Wound Care:  1. Use ice packs for the first two days following surgery if you feel that it helps your discomfort.  2. Take warm soaks/sitzbaths/showers 1-2 times a day, water as warm as can be tolerated.  3. Expect bleeding/drainage with bowel movements, so wear pads to protect underwear.    Pain Meds:  1. Motrin 600mg every 6 hours as needed  2. Narcotic pain medication as prescribed  3. If nauseated or vomiting, take phenergan or zofran as prescribed (call my office for a prescription).  Try to stay hydrated.  If nausea persists, you need to be seen in the office.   4. Do not drive while taking narcotics    Constipated:   1. To avoid constipation:   A. Take Fiber (example: metamucil) 4 grams daily   B. Miralax 17 grams nightly as needed   C. Milk of Magnesia 2 tablespoons am/pm as needed  2. If bowel movements become too loose, stop MOM but stay on fiber supplements.    No Bowel Movements:  1. If unable to have a bowel movement in 36-48 hours:   A. Take 3 Dulcolax laxative tablets   B. If no bowel movement within 4-6 hours after Dulcolax, take 5 ounces of Magnesium Citrate    If Unable to Urinate:  1. Try to urinate in a hot shower or bath.   2. If still unable to urinate, go to the Emergency Room to have a catheter placed.  The doctor in the office will remove  this catheter in a few days.     When to Call Us:  1. Fever higher than 101.5  2. Persistent nausea and/or vomiting  3. Excessive bleeding (bloody diarrhea).  It is normal to pass some blood with bowel movements but passing a cup of blood at a time is abnormal.  4. Increasing pain not relieved with above instructions.     Follow up:   Generally in 2-3 weeks unless otherwise directed.  Please call to make an appointment.         Javier Will MD  Colon and Rectal Surgery   [facility]

## 2024-02-17 NOTE — PLAN OF CARE
Goal Outcome Evaluation:   Pt c/o rectal pain and prn pain med given.  Pt states med is effective.  Pt NPO at midnight.  Will continue current plan of care.

## 2024-02-17 NOTE — CONSULTS
Colorectal Surgery Consultation Note    ID:  Gena Harper;   : 1965  DATE OF VISIT: 2024      Chief Complaint  Abscess (Pt sent from urgent care with possible perrectal abscess.  Pt states started having trouble Tuesday afternoon./ /)       History of Present Illness  Gena Harper is a 58 y.o. female who I was asked to see in consultation by medical team to evaluate for a lucie-anal abscess.    Patient has had pain and swelling in the anal area for the past 5 days. These symptoms initially started with discomfort that worsened recently. There is no drainage. She never had similar issues before. Denies fever but reports chills.     Patient states she has a bowel movement 1-2 per day. It is normal to hard in consistency. Patient has no bleeding with stools; she has no pain with bowel movements; she has no itching;  she states and has protrusion of tissue while straining.    Patient does not take supplemental fiber.    Last colonoscopy: never had one     Denies family history colon or rectal cancer. Denies any family history of IBD.     Past Medical History  Past Medical History:   Diagnosis Date    Cataract     Implants on 2/15/17 & 17    Delivery of      Abstraction from [facility] Childbirth x 3    Hypertension      Past Surgical History  Past Surgical History:   Procedure Laterality Date    BREAST BIOPSY Right 2018    core    CATARACT EXTRACTION Right     MOUTH SURGERY      Top 2 molar/oral surgery     Family History  Family History   Problem Relation Age of Onset    Heart disease Father     Kidney disease Father     Diabetes Maternal Grandmother     Breast cancer Maternal Grandmother     Breast cancer Sister 44         at 48    Cancer Sister         Breast cancer -  2012     No colorectal cancer history in immediate family members.  Social History  Social History     Tobacco Use    Smoking status: Never     Passive exposure: Never    Smokeless tobacco: Never    Vaping Use    Vaping Use: Never used   Substance Use Topics    Alcohol use: Yes     Alcohol/week: 4.0 standard drinks of alcohol     Types: 2 Glasses of wine, 2 Shots of liquor per week     Comment: occ    Drug use: Never     For further details please see Health History Questionnaire scanned in Epic.  Medication List  @medcurrent@  Allergies  No Known Allergies  Review of Systems  All systems reviewed and are otherwise negative, pertinent positives noted in the HPI and Health History Questionnaire scanned in Epic.    Physical Exam  General:  No acute distress  Head: Normocephalic, atraumatic  Neuro: Alert and oriented      External anorectal exam: Deferred due to pain and will be done in the operating room     CT ABDOMEN PELVIS W CONTRAST     Date of Exam: 2/16/2024 3:47 PM EST     Indication: abscess buttock.     Comparison: None     Technique: Axial CT images were obtained of the abdomen and pelvis following the uneventful intravenous administration of iodinated contrast. Sagittal and coronal reconstructions were performed.  Automated exposure control and iterative reconstruction   methods were used.           Findings:  Visualized Chest: 1.8 cm nodule within the right lower lobe with a small focal calcification (image 14 of series 3). Otherwise unremarkable     Liver: Liver is normal in size and CT density. No focal lesions.     Gallbladder: The gallbladder is normal without evidence of radiopaque stones.     Bile Ducts: No billiary dcutal dilation.     Spleen: Spleen is normal in size and CT density.     Pancreas: Pancreas is normal. There is no evidence of pancreatic mass or peripancreatic fluid.     Adrenals: Adrenal glands are unremarkable.     Kidneys: Kidneys are normal in size. There are no stones or hydronephrosis.     Gastrointestinal: Oral contrast is present within the small bowel. No dilated loops of bowel to suggest bowel obstruction. No acute inflammatory changes of the intra-abdominal  intrapelvic GI tract.     Bladder: The bladder is normal.     Pelvis: Lobulated and heterogeneous uterus, most significantly at the fundus with a possible 5.6 x 5.8 cm mass. Otherwise     Peritoneum/Mesentery: No fluid collection, ascities, or free air.      Lymph Nodes: No lymphadenopathy.     Vasculature: Unremarkable     Abdominal Wall: Within the right medial aspect of the pelvis soft tissue/medial thigh adjacent to the anus, there is a 7.0 x 4.8 x 6.8 cm fluid collection containing a small locule of gas (image 150 of series 3). This fluid collection abuts the right   lateral aspect of the anus. There is surrounding soft tissue fat stranding.     Bony Structures: No acute osseous abnormality     IMPRESSION:  Impression:  Within the right medial aspect of the pelvic soft tissue/medial thigh adjacent to the anus there is a 7 cm fluid collection containing a small locule of gas. This most likely represents a perianal abscess. Infection with a gas-forming organism is not   completely excluded.     Incidentally noted 1.8 cm nodule within the right lower lobe with a small focal calcification. While this may represent a developing calcified granuloma, this may also represent a discrete pulmonary nodule. Please consider follow-up with dedicated PET/CT   or tissue sampling. Alternatively a repeat chest CT in 3 months can be obtained.     Heterogeneous and enlarged uterus with a possible 5.6 cm mass within the uterine fundus. This most likely represents a uterine fibroid.    Assessment  -Ischiorectal abscess  -Anorectal pain   - constipation     Plan / Recommendations    1. The patient has an ischiorectal abscess in the right lateral region requiring drainage. I have added her to the operating room schedule for today.I explained that there is a chance that this could develop into a fistula which would require surgery and that we would only be able to make that determination after 3-4 weeks.     Risk of procedure include,  but are not limited to: bleeding, wound infection, pelvic infection, urinary retention possibly requiring a crowe catheter. Patient understands there is always the risk of incontinence to gas or stool with any anorectal surgery, but great pains will be taken to minimize this risk. Further treatments or operations may be necessary as the situation dictates.    Patient demonstrates understanding and all questions are answered. Patient consents to go ahead with proposed procedure.     2. Ok to discharge after procedure home     3. We will have patient follow-up with us in 3-4 weeks for repeat evaluation. Patient is instructed to call our office promptly if she develops any fevers or worsening pain.    4. Continue NPO until surgery     I have personally reviewed her CT showed 7cm ischiorectal abscess. I have personally reviewed her lab showing leukocytosis and hyperglycemia.       Javier Will MD  Colon and Rectal Surgery   [facility]

## 2024-02-17 NOTE — DISCHARGE SUMMARY
[facility] Medicine Services  Discharge Summary    Date of Service: 2024  Patient Name: Gena Harper  : 1965  MRN: [id number]    Date of Admission: 2024  Discharge Diagnosis: Ischiorectal rectal abscess, pulmonary nodule incidental finding  Date of Discharge: 2024  Primary Care Physician: Day Cardoso APRN      Presenting Problem:   Intramural leiomyoma of uterus [D25.1]  Perianal abscess [K61.0]  Rectal or anal pain [K62.89]    Active and Resolved Hospital Problems:  Active Hospital Problems    Diagnosis POA    **Perianal abscess [K61.0] Yes      Resolved Hospital Problems   No resolved problems to display.         Hospital Course     HPI:  Admitting team HPI  Gena Harper is a 58 y.o. female with past medical history of DM2, hypertension who presented to [facility] on 2024 complaining of rectal pain since Tuesday.  Patient states she was feeling congested with mild headache and had some chills.  She started noticing swelling and pain around the anal area afterwards.  Thought again she was still having fever secondary to the URI however swelling started getting worse with more fevers chills.  She went to urgent care and was told to come to the ER for perirectal abscess.  CT abdomen pelvis in the ER showing perianal abscess, general surgery was consulted for drainage tomorrow.  Patient denies any associated abdominal pain, nausea, vomiting, diarrhea, dysuria, chest pain, shortness of breath.  Does admit to being diabetic for past few years, was never on insulin before.  Does not check blood glucose at home regularly.     Hospital Course:  Ischiorectal rectal abscess  -CT abdomen pelvis findings noted Concern for gas-forming bacteria as well  -CRS consulted status post drainage bed side drainage. Tolerated will discharge on po augmentin as recommended by CRS along with bowel regimen and pain medications        #Pulmonary nodule  1.8 cm nodule within  right lower lobe small focal calcification.  Discussed with patient and family.  Advised repeat CT scan of lungs in 3 months     35 mins of my time spent on discharging this patient     DISCHARGE Follow Up Recommendations for labs and diagnostics: follow up with CRS. Repeat CT chest in 3 months       Reasons For Change In Medications and Indications for New Medications:      Day of Discharge     Vital Signs:  Temp:  [97.6 °F (36.4 °C)-98.7 °F (37.1 °C)] 97.6 °F (36.4 °C)  Heart Rate:  [] 100  Resp:  [16-20] 16  BP: (100-160)/(56-87) 121/65    Physical Exam:  Physical Exam   AOX3 nAD   RRR, S1 & S2  Lungs CTA   ABD soft, NT, ND       Pertinent  and/or Most Recent Results     LAB RESULTS:      Lab 02/16/24 2227 02/16/24  1406   WBC 13.50* 11.90*   HEMOGLOBIN 9.7* 10.0*   HEMATOCRIT 30.3* 31.3*   PLATELETS 241 242   NEUTROS ABS 10.00* 9.30*   LYMPHS ABS 2.30 1.80   MONOS ABS 1.10* 0.70   EOS ABS 0.10 0.00   MCV 81.4 79.1   PROTIME 10.8  --          Lab 02/16/24 2227 02/16/24  1406   SODIUM 138 139   POTASSIUM 3.4* 3.7   CHLORIDE 102 102   CO2 26.0 26.0   ANION GAP 10.0 11.0   BUN 10 14   CREATININE 0.66 0.80   EGFR 102.5 86.1   GLUCOSE 193* 142*   CALCIUM 8.9 9.3   HEMOGLOBIN A1C  --  5.90*         Lab 02/16/24  1406   TOTAL PROTEIN 7.4   ALBUMIN 3.8   GLOBULIN 3.6   ALT (SGPT) 18   AST (SGOT) 16   BILIRUBIN 0.3   ALK PHOS 113         Lab 02/16/24 2227   PROTIME 10.8   INR 0.99                 Brief Urine Lab Results       None          Microbiology Results (last 10 days)       ** No results found for the last 240 hours. **            CT Abdomen Pelvis With Contrast    Result Date: 2/16/2024  Impression: Impression: Within the right medial aspect of the pelvic soft tissue/medial thigh adjacent to the anus there is a 7 cm fluid collection containing a small locule of gas. This most likely represents a perianal abscess. Infection with a gas-forming organism is not completely excluded. Incidentally noted 1.8 cm  nodule within the right lower lobe with a small focal calcification. While this may represent a developing calcified granuloma, this may also represent a discrete pulmonary nodule. Please consider follow-up with dedicated PET/CT  or tissue sampling. Alternatively a repeat chest CT in 3 months can be obtained. Heterogeneous and enlarged uterus with a possible 5.6 cm mass within the uterine fundus. This most likely represents a uterine fibroid. Electronically Signed: Jose Ly DO  2/16/2024 4:03 PM EST  Workstation ID: [id number]                 Labs Pending at Discharge:  Pending Labs       Order Current Status    Blood Culture - Blood, Arm, Left In process    Blood Culture - Blood, Arm, Right In process            Procedures Performed  Procedure(s):  INCISION AND DRAINAGE OF PERIRECTAL ABSCESS WITH POSSIBLE SETON PLACEMENT - PRONE         Consults:   Consults       No orders found for last 30 day(s).              Discharge Details        Discharge Medications        ASK your doctor about these medications        Instructions Start Date   atorvastatin 20 MG tablet  Commonly known as: LIPITOR   20 mg, Oral, Daily      CALTRATE 600 PO   1 tablet, Oral, Daily      Claritin 10 MG tablet  Generic drug: loratadine   10 mg, Oral, Daily      ferrous sulfate 325 (65 FE) MG tablet   325 mg, Oral, Daily With Breakfast      fish oil 1000 MG capsule capsule   1,000 mg, Oral, Daily With Breakfast      Garlic 100 MG tablet   100 mg, Oral, Daily      glucosamine-chondroitin 500-400 MG capsule capsule   1 capsule, Oral, Daily      ibuprofen 200 MG tablet  Commonly known as: ADVIL,MOTRIN   200 mg, Oral, Every 6 Hours PRN      lisinopril 20 MG tablet  Commonly known as: PRINIVIL,ZESTRIL   20 mg, Oral, Daily      PARoxetine 10 MG tablet  Commonly known as: PAXIL   10 mg, Oral, Daily      Rybelsus 7 MG tablet  Generic drug: Semaglutide   TAKE 1 TABLET BY MOUTH EVERY MORNING BEFORE BREAKFAST      Vitamin D (Cholecalciferol) 10 MCG  (400 UNIT) tablet  Commonly known as: CHOLECALCIFEROL   400 Units, Oral, Daily      Zinc 100 MG tablet   100 mg, Oral, Daily               No Known Allergies      Discharge Disposition: Home       Diet:  Hospital:  Diet Order   Procedures    NPO Diet NPO Type: Strict NPO         Discharge Activity:         CODE STATUS:  Code Status and Medical Interventions:   Ordered at: 02/16/24 1746     Code Status (Patient has no pulse and is not breathing):    CPR (Attempt to Resuscitate)     Medical Interventions (Patient has pulse or is breathing):    Full Support         Future Appointments   Date Time Provider Department Center   2/22/2024  7:30 AM [facility]   7/23/2024  8:15 AM NURSE/MA [facility]   7/31/2024  8:00 AM Day Cardoso APRN [facility]           Time spent on Discharge including face to face service:  >30 minutes    Signature: Electronically signed by Jethro Figueroa MD, 02/17/24, 11:26 EST.  [facility] Hospitalist Team

## 2024-02-17 NOTE — ED NOTES
Pt. Standing with no acute distress- eating dinner tray at this time. S/o at bedside. Aware she is waiting clean bed status.

## 2024-02-17 NOTE — PROGRESS NOTES
"Pharmacy Antimicrobial Dosing Service    Subjective:  Gena Harper is a 58 y.o.female admitted with perianal abscess. Pharmacy has been consulted to dose Vancomycin for possible IAI/SSTI.    PMH: BMI>40      Assessment/Plan    1. Day #1 Vancomycin: Goal -600 mcg*h/mL. Vancomycin 1500mg (19.8 mg/kg AdjBW) IV x 1 dose ordered for ER. Will order vancomycin 1250mg (16.6 mg/kg AdjBW) IV q12h. Will obtain levels prior to fourth total dose tomorrow at 1200 and 1900.     2. Day #1 Ampicillin/Sulbactam: 3g IV q6h for estCrCl > 30 mL/min.    3. Day #1 Clindamycin: 600mg IV q8h.     Will continue to monitor drug levels, renal function, culture and sensitivities, and patient clinical status.       Objective:  Relevant clinical data and objective history reviewed:  162.6 cm (64\")   107 kg (234 lb 12.6 oz)   Ideal body weight: 54.7 kg (120 lb 9.5 oz)  Adjusted ideal body weight: 75.4 kg (166 lb 4.3 oz)  Body mass index is 40.3 kg/m².        Results from last 7 days   Lab Units 02/16/24  1406   CREATININE mg/dL 0.80     Estimated Creatinine Clearance: 92.6 mL/min (by C-G formula based on SCr of 0.8 mg/dL).  No intake/output data recorded.    Results from last 7 days   Lab Units 02/16/24  1406   WBC 10*3/mm3 11.90*     Temperature    02/16/24 1333   Temp: 98.7 °F (37.1 °C)     Baseline culture/source/susceptibility:  Microbiology Results (last 10 days)       ** No results found for the last 240 hours. **            Shyanne Nice, [facility]  02/16/24 19:17 EST    "

## 2024-02-19 ENCOUNTER — TRANSITIONAL CARE MANAGEMENT TELEPHONE ENCOUNTER (OUTPATIENT)
Dept: CALL CENTER | Facility: HOSPITAL | Age: 59
End: 2024-02-19
Payer: COMMERCIAL

## 2024-02-19 NOTE — OUTREACH NOTE
Call Center TCM Note      Flowsheet Row Responses   [facility] patient discharged from? [facility]   Does the patient have one of the following disease processes/diagnoses(primary or secondary)? General Surgery   TCM attempt successful? Yes   Call start time 1327   Call end time 1341   Discharge diagnosis Intramural leiomyoma of uterusPerianal abscess   Person spoke with today (if not patient) and relationship pt   Meds reviewed with patient/caregiver? Yes   Is the patient having any side effects they believe may be caused by any medication additions or changes? No   Does the patient have all medications related to this admission filled (includes all antibiotics, pain medications, etc.) Yes   Is the patient taking all medications as directed (includes completed medication regime)? Yes   Comments Advised to make a Post-Op appt   Does the patient have an appointment with their PCP within 7-14 days of discharge? Yes  [2/23/2024 at 3:30 PM]   Psychosocial issues? No   Did the patient receive a copy of their discharge instructions? Yes   Nursing interventions Reviewed instructions with patient   What is the patient's perception of their health status since discharge? Improving   Nursing interventions Nurse provided patient education   Is the patient /caregiver able to teach back basic post-op care? Take showers only when approved by MD-sponge bathe until then, No tub bath, swimming, or hot tub until instructed by MD, Keep incision areas clean,dry and protected   Is the patient/caregiver able to teach back signs and symptoms of incisional infection? Increased redness, swelling or pain at the incisonal site, Increased drainage or bleeding, Incisional warmth, Pus or odor from incision, Fever   Is the patient/caregiver able to teach back steps to recovery at home? Rest and rebuild strength, gradually increase activity, Eat a well-balance diet   If the patient is a current smoker, are they able to teach back resources for  cessation? Not a smoker   Is the patient/caregiver able to teach back the hierarchy of who to call/visit for symptoms/problems? PCP, Specialist, Home health nurse, Urgent Care, ED, 911 Yes   TCM call completed? Yes   Wrap up additional comments Pt states she is doing better and back to work, and inquired about wound care for surgical site. Pt advised to call surgeon's/PCP office about wound care as pt/spouse having a hard time covering area. Pt verified PCP appt and advised to make a post-op appt.   Call end time 1341   Would this patient benefit from a Referral to [facility] Social Work? No   Is the patient interested in additional calls from an ambulatory ? No            Nisha Busby RN    2/19/2024, 13:45 EST

## 2024-02-19 NOTE — PAYOR COMM NOTE
"AUTHORIZATION PENDING:   PLEASE CALL OR FAX DETERMINATION TO CONTACT BELOW. THANK YOU.        Mari Johnson RN MSN  /UR  [facility]  [phone] office  [phone] fax  [email or web address]    [facility]  NPI: [id number]  Tax: [id number]          Gena Harper (58 y.o. Female)       Date of Birth   1965    Social Security Number       Address   [address] [place] [address] [place] [address]    Home Phone   [phone]    MRN   [id number]       Christianity   Hinduism    Marital Status                               Admission Date   2/16/24    Admission Type   Emergency    Admitting Provider   Jethro Figueroa MD    Attending Provider       Department, Room/Bed   [facility] 3C MEDICAL INPATIENT, 356/1       Discharge Date   2/17/2024    Discharge Disposition   Home or Self Care    Discharge Destination                                 Attending Provider: (none)   Allergies: No Known Allergies    Isolation: None   Infection: None   Code Status: Prior    Ht: 162.6 cm (64\")   Wt: 106 kg (232 lb 12.8 oz)    Admission Cmt: None   Principal Problem: Perianal abscess [K61.0]                   Active Insurance as of 2/16/2024       Primary Coverage       Payor Plan Insurance Group Employer/Plan Group    [facility] [employer] [facility] [employer] [facility] PPO [id number]       Payor Plan Address Payor Plan Phone Number Payor Plan Fax Number Effective Dates    [address] [phone]  7/1/2016 - None Entered    [place] [address]         Subscriber Name Subscriber Birth Date Member ID       GENA HARPER 1965 [id number]                     Emergency Contacts        (Rel.) Home Phone Work Phone Mobile Phone    GRISELDA HARPER (Spouse) [phone] -- [phone]          02/16/24 1746  Inpatient Admission  Once     Completed     Level of Care: Med/Surg  Diagnosis: Perianal abscess [915204]  Certification: I Certify That Inpatient " Hospital Services Are Medically Necessary For Greater Than 2 Midnights               History & Physical        Winifred Chin DO at 24 1800              Hospitalist Service  History and Physical      Patient Name: Gena Harper  : 1965  MRN: [id number]  Primary Care Physician:  Day Cardoso APRN  Date of admission: 2024      Subjective      Chief Complaint: rectal pain    History of Present Illness: Gena Harper is a 58 y.o. female with past medical history of DM2, hypertension who presented to [facility] on 2024 complaining of rectal pain since Tuesday.  Patient states she was feeling congested with mild headache and had some chills.  She started noticing swelling and pain around the anal area afterwards.  Thought again she was still having fever secondary to the URI however swelling started getting worse with more fevers chills.  She went to urgent care and was told to come to the ER for perirectal abscess.  CT abdomen pelvis in the ER showing perianal abscess, general surgery was consulted for drainage tomorrow.  Patient denies any associated abdominal pain, nausea, vomiting, diarrhea, dysuria, chest pain, shortness of breath.  Does admit to being diabetic for past few years, was never on insulin before.  Does not check blood glucose at home regularly.      ROS: Pertinent positives as noted in HPI/subjective.  All other systems were reviewed and are negative.      Personal History     Past Medical History:   Diagnosis Date    Cataract     Implants on 2/15/17 & 17    Delivery of      Abstraction from [facility] Childbirth x 3    Hypertension        Past Surgical History:   Procedure Laterality Date    BREAST BIOPSY Right 2018    core    CATARACT EXTRACTION Right     MOUTH SURGERY      Top 2 molar/oral surgery       Family History: family history includes Breast cancer in her maternal grandmother; Breast cancer (age of onset: 44) in her sister; Cancer  in her sister; Diabetes in her maternal grandmother; Heart disease in her father; Kidney disease in her father. Otherwise pertinent FHx was reviewed and not pertinent to current issue.    Social History:  reports that she has never smoked. She has never been exposed to tobacco smoke. She has never used smokeless tobacco. She reports current alcohol use of about 4.0 standard drinks of alcohol per week. She reports that she does not use drugs.    Home Medications:  Prior to Admission Medications       Prescriptions Last Dose Informant Patient Reported? Taking?    atorvastatin (LIPITOR) 20 MG tablet   No No    Take 1 tablet by mouth Daily.    glucose blood (FREESTYLE TEST STRIPS) test strip   No No    Use to check blood glucose daily and as needed DX: E11.65    glucose monitoring kit (FREESTYLE) monitoring kit   No No    Please provide glucometer on patient's formulary to check blood glucose daily and as needed. DX: E11.65    Lancets (freestyle) lancets   No No    Use to check blood glucose daily and as needed DX: E11.65    lisinopril (PRINIVIL,ZESTRIL) 20 MG tablet   No No    Take 1 tablet by mouth Daily.    PARoxetine (PAXIL) 10 MG tablet   No No    Take 1 tablet by mouth Daily.    Rybelsus 7 MG tablet   No No    TAKE 1 TABLET BY MOUTH EVERY MORNING BEFORE BREAKFAST              I have utilized all available, immediate resources to obtain, update, or review the patient's current medications including all prescriptions, over-the-counter products, herbals, cannabis/cannabidiol products, and vitamin.mineral/dietary (nutritional) supplements.    Allergies:  No Known Allergies    Objective      Vitals:   Temp:  [98.5 °F (36.9 °C)-98.7 °F (37.1 °C)] 98.7 °F (37.1 °C)  Heart Rate:  [] 104  Resp:  [18-20] 18  BP: (129-160)/(79-84) 160/84    Physical Exam:    General: Awake, alert, NAD  HEENT: NC/AT, PERRL, EOMI, conjunctivae are clear, mucous membrane moist, oropharynx clear, Trachea midline   Cardiovascular: Regular  rate and rhythm, no murmurs  Respiratory: Clear to auscultation bilaterally, no wheezing or rales, unlabored breathing  Abdomen: Soft, nontender, positive bowel sounds, no guarding  Neurologic: A&O, CN grossly intact, moves all extremities spontaneously  Musculoskeletal: Normal range of motion, no other gross deformities  Skin: Warm, dry, right cheek perianal swelling with surrounding erythema, no active drainage, mildly tender to palpation      Result Review   Result Review:  I have personally reviewed the results from the time of this admission to 2/16/2024 18:00 EST and agree with these findings:  [x]  Laboratory  [x]  Microbiology  [x]  Radiology  []  EKG/Telemetry   []  Cardiology/Vascular   []  Pathology  []  Old records  []  Other:      Assessment & Plan        Active Hospital Problems:  Active Hospital Problems    Diagnosis     **Perianal abscess        Assessment/Plan:     Perianal abscess  -CT abdomen pelvis findings noted  -Concern for gas-forming bacteria as well  -Continue Unasyn, vancomycin, add clindamycin  -General surgery consulted in ER and plan for drainage tomorrow  -N.p.o. past midnight    DM2   -A1c ordered, patient does not regularly check glucose at home  -Continue SSI, monitor and assess need    Hypertension   hyperlipidemia   chronic anxiety  -Continue home meds, monitor    DVT prophylaxis:  Mechanical DVT prophylaxis orders are present.        Differential diagnosis including as mentioned above in A/P. Discussed the case in detail with the patient/family who also stated the patient has above. Discussed the case in details with consultants who agreed with the current diagnosis of Perianal abscess and agreed with the treatment plan for this. I reviewed the data independently as mentioned in my MDM. Shared decision making completed with the clinician and patient regarding risk and benefits of treatment plan and patient agreed to proceed with the above mentioned treatment plan.    CODE STATUS:     Code Status (Patient has no pulse and is not breathing): CPR (Attempt to Resuscitate)  Medical Interventions (Patient has pulse or is breathing): Full Support      Admission Status:  I believe this patient meets obs status.    Signature:   Electronically signed by Winifred Chin DO, 02/16/24, 6:00 PM EST.    Part of this note may be an electronic transcription/translation of spoken language to printed text using the Dragon Dictation System.      Electronically signed by Winifred Chin DO at 02/16/24 1804          Emergency Department Notes        Estrellita Matthews, RN at 02/16/24 1943          Pt taken by wheelchair to 356. Spouse at bedside with personal belongings.    Electronically signed by Estrellita Matthews RN at 02/16/24 1943       Estrellita Matthews RN at 02/16/24 1915          Pt. Standing with no acute distress- eating dinner tray at this time. S/o at bedside. Aware she is waiting clean bed status.     Electronically signed by Estrellita Matthews RN at 02/16/24 1945       Nahid Tillman MD at 02/16/24 1737          Subjective   History of Present Illness  58-year-old female reports a 3-day history of increasing perirectal pain.  She states this mostly on the right.  She states that she has had subjective fever and shaking chills in the last 24 hours  Reports that she has had some nausea but no vomiting or diarrhea.  She denies dysuria or hematuria.  She reports no recent melena hematemesis hematochezia and has not been formally diagnosed as having diverticulosis or diverticulitis    Review of Systems   Constitutional:  Positive for chills, fatigue and fever. Negative for unexpected weight change.   Gastrointestinal:  Positive for abdominal pain, nausea and rectal pain.   Genitourinary:  Positive for decreased urine volume.   Hematological:  Does not bruise/bleed easily.   All other systems reviewed and are negative.      Past Medical History:   Diagnosis Date    Cataract     Implants on 2/15/17 &  17    Delivery of      Abstraction from [employer] Childbirth x 3    Hypertension        No Known Allergies    Past Surgical History:   Procedure Laterality Date    BREAST BIOPSY Right 2018    core    CATARACT EXTRACTION Right     MOUTH SURGERY      Top 2 molar/oral surgery       Family History   Problem Relation Age of Onset    Heart disease Father     Kidney disease Father     Diabetes Maternal Grandmother     Breast cancer Maternal Grandmother     Breast cancer Sister 44         at 48    Cancer Sister         Breast cancer -  2012       Social History     Socioeconomic History    Marital status:    Tobacco Use    Smoking status: Never     Passive exposure: Never    Smokeless tobacco: Never   Vaping Use    Vaping Use: Never used   Substance and Sexual Activity    Alcohol use: Yes     Alcohol/week: 4.0 standard drinks of alcohol     Types: 2 Glasses of wine, 2 Shots of liquor per week     Comment: occ    Drug use: Never    Sexual activity: Yes     Partners: Male     No recent unusual food water travel or activity      Objective   Physical Exam  Alert [place] Coma Scale 15   HEENT: Pupils equal and reactive to light. Conjunctivae are not injected. Normal tympanic membranes. Oropharynx and nares are normal.   Neck: Supple. Midline trachea. No JVD. No goiter.   Chest: Clear and equal breath sounds bilaterally, regular rate and rhythm without murmur or rub.   Abdomen: Positive bowel sounds, nontender, nondistended. No rebound or peritoneal signs. No CVA tenderness.  There is a 7 to 8 cm area of induration on the proximal right gluteal cleft that extends up to the anal verge.  The patient has nonthrombosed external hemorrhoids that do not appear to have been recently bleeding.  There is some lymphangitic extension beyond the indurated area  Extremities no clubbing. cyanosis or edema. Motor sensory exam is normal. The full range of motion is intact   Skin: Warm and dry, no  rashes or petechia.   Lymphatic: No regional lymphadenopathy. No calf pain, swelling or Homans sign    Procedures          ED Course                         Labs Reviewed   COMPREHENSIVE METABOLIC PANEL - Abnormal; Notable for the following components:       Result Value    Glucose 142 (*)     All other components within normal limits    Narrative:     GFR Normal >60  Chronic Kidney Disease <60  Kidney Failure <15     CBC WITH AUTO DIFFERENTIAL - Abnormal; Notable for the following components:    WBC 11.90 (*)     Hemoglobin 10.0 (*)     Hematocrit 31.3 (*)     MCH 25.2 (*)     RDW 16.4 (*)     Neutrophil % 78.7 (*)     Lymphocyte % 15.0 (*)     Neutrophils, Absolute 9.30 (*)     All other components within normal limits   BLOOD CULTURE   BLOOD CULTURE   CBC AND DIFFERENTIAL    Narrative:     The following orders were created for panel order CBC & Differential.  Procedure                               Abnormality         Status                     ---------                               -----------         ------                     CBC Auto Differential[[id number]]        Abnormal            Final result                 Please view results for these tests on the individual orders.     Medications   ampicillin-sulbactam (UNASYN) 3 g in sodium chloride 0.9 % 100 mL IVPB-MBP (has no administration in time range)   Pharmacy to dose vancomycin (has no administration in time range)   atorvastatin (LIPITOR) tablet 20 mg (has no administration in time range)   lisinopril (PRINIVIL,ZESTRIL) tablet 20 mg (has no administration in time range)   PARoxetine (PAXIL) tablet 10 mg (has no administration in time range)   sodium chloride 0.9 % flush 10 mL (has no administration in time range)   sodium chloride 0.9 % flush 10 mL (has no administration in time range)   sodium chloride 0.9 % infusion 40 mL (has no administration in time range)   ondansetron ODT (ZOFRAN-ODT) disintegrating tablet 4 mg (has no administration in time  range)     Or   ondansetron (ZOFRAN) injection 4 mg (has no administration in time range)   Potassium Replacement - Follow Nurse / BPA Driven Protocol (has no administration in time range)   Magnesium Standard Dose Replacement - Follow Nurse / BPA Driven Protocol (has no administration in time range)   Phosphorus Replacement - Follow Nurse / BPA Driven Protocol (has no administration in time range)   Calcium Replacement - Follow Nurse / BPA Driven Protocol (has no administration in time range)   sodium chloride 0.9 % infusion (has no administration in time range)   sennosides-docusate (PERICOLACE) 8.6-50 MG per tablet 2 tablet (has no administration in time range)     And   polyethylene glycol (MIRALAX) packet 17 g (has no administration in time range)     And   bisacodyl (DULCOLAX) EC tablet 5 mg (has no administration in time range)     And   bisacodyl (DULCOLAX) suppository 10 mg (has no administration in time range)   acetaminophen (TYLENOL) tablet 650 mg (has no administration in time range)     Or   acetaminophen (TYLENOL) 160 MG/5ML oral solution 650 mg (has no administration in time range)     Or   acetaminophen (TYLENOL) suppository 650 mg (has no administration in time range)   HYDROcodone-acetaminophen (NORCO) 5-325 MG per tablet 1 tablet (has no administration in time range)   sodium chloride 0.9 % bolus 1,000 mL (1,000 mL Intravenous New Bag 2/16/24 1424)   ondansetron (ZOFRAN) injection 4 mg (4 mg Intravenous Given 2/16/24 1424)   HYDROmorphone (DILAUDID) injection 0.5 mg (0.5 mg Intravenous Given 2/16/24 1424)   iopamidol (ISOVUE-370) 76 % injection 100 mL (100 mL Intravenous Given 2/16/24 0462)     CT Abdomen Pelvis With Contrast    Result Date: 2/16/2024  Impression: Within the right medial aspect of the pelvic soft tissue/medial thigh adjacent to the anus there is a 7 cm fluid collection containing a small locule of gas. This most likely represents a perianal abscess. Infection with a gas-forming  organism is not completely excluded. Incidentally noted 1.8 cm nodule within the right lower lobe with a small focal calcification. While this may represent a developing calcified granuloma, this may also represent a discrete pulmonary nodule. Please consider follow-up with dedicated PET/CT  or tissue sampling. Alternatively a repeat chest CT in 3 months can be obtained. Heterogeneous and enlarged uterus with a possible 5.6 cm mass within the uterine fundus. This most likely represents a uterine fibroid. Electronically Signed: Jose Ly DO  2/16/2024 4:03 PM EST  Workstation ID: [id number]                         Medical Decision Making  This was discussed with surgery and hospitalist services.  The patient was started on IV antibiotics, Unasyn and vancomycin, pain and nausea were relieved the emergency department the patient will be admitted for definitive surgical evaluation she was agreeable to this plan of treatment    Amount and/or Complexity of Data Reviewed  Labs: ordered. Decision-making details documented in ED Course.  Radiology: ordered and independent interpretation performed.    Risk  OTC drugs.  Prescription drug management.  Parenteral controlled substances.  Decision regarding hospitalization.        Final diagnoses:   Perianal abscess   Intramural leiomyoma of uterus   Rectal or anal pain       ED Disposition  ED Disposition       ED Disposition   Decision to Admit    Condition   --    Comment   Level of Care: Telemetry [5]   Diagnosis: Perianal abscess [579191]   Admitting Physician: AILYN HOYOS [[id number]]   Attending Physician: AILYN HOYOS [[id number]]                 No follow-up provider specified.       Medication List      No changes were made to your prescriptions during this visit.            Nahid Tillman MD  02/16/24 1747      Electronically signed by Nahid Tillman MD at 02/16/24 8397          Operative/Procedure Notes (all)        Javier Will MD at 02/17/24 1617  Version  1 of 1         CRS Operative Note   Name: Gena Harper  : 1965  Date of Surgery: 2024  Pre-op Diagnosis: Anal abscess  Post-op Diagnosis: same  Procedure: Incision and drainage of abscess  Surgeon: Javier Will MD   Assistants: N/A  Anesthesia:local   Estimated Blood Loss: minimal  Drains: none   Implants: none  Specimen: none   Findings   1. Ischiorectal abscess  Complications   No complications  Indication  58 y.o. female with perianal abscess     Description of Procedure  Patient was placed in prone position. Right gluteus and perianal area cleaned with betadine solution.     A local anal block was performed.    On inspection there was an indurated and fluctuant the right lateral. This area was opened with scalpel and a significant quantity of pus was expressed. Hemostasis was obtained.    Dressings were applied and the case was concluded.    Electronically signed by Javier Will MD at 24 1621          Physician Progress Notes (all)        Javier Will MD at 24 1621          Abscess drained at bedside.   Ok for diabetic diet and discharge to home.   Instruction give.  Follow up in clinic in 4 weeks time.   Augmentin sent to pharmacy.     Electronically signed by Javier Will MD at 24 1622          Consult Notes (all)        Javier Will MD at 24 1010          Colorectal Surgery Consultation Note    ID:  Gena Harper;   : 1965  DATE OF VISIT: 2024      Chief Complaint  Abscess (Pt sent from urgent care with possible perrectal abscess.  Pt states started having trouble Tuesday afternoon./ /)       History of Present Illness  Gena Harper is a 58 y.o. female who I was asked to see in consultation by medical team to evaluate for a lucie-anal abscess.    Patient has had pain and swelling in the anal area for the past 5 days. These symptoms initially started with discomfort that worsened recently. There is no drainage. She never had similar issues  before. Denies fever but reports chills.     Patient states she has a bowel movement 1-2 per day. It is normal to hard in consistency. Patient has no bleeding with stools; she has no pain with bowel movements; she has no itching;  she states and has protrusion of tissue while straining.    Patient does not take supplemental fiber.    Last colonoscopy: never had one     Denies family history colon or rectal cancer. Denies any family history of IBD.     Past Medical History  Past Medical History:   Diagnosis Date    Cataract     Implants on 2/15/17 & 17    Delivery of      Abstraction from [facility] Childbirth x 3    Hypertension      Past Surgical History  Past Surgical History:   Procedure Laterality Date    BREAST BIOPSY Right 2018    core    CATARACT EXTRACTION Right     MOUTH SURGERY      Top 2 molar/oral surgery     Family History  Family History   Problem Relation Age of Onset    Heart disease Father     Kidney disease Father     Diabetes Maternal Grandmother     Breast cancer Maternal Grandmother     Breast cancer Sister 44         at 48    Cancer Sister         Breast cancer -  2012     No colorectal cancer history in immediate family members.  Social History  Social History     Tobacco Use    Smoking status: Never     Passive exposure: Never    Smokeless tobacco: Never   Vaping Use    Vaping Use: Never used   Substance Use Topics    Alcohol use: Yes     Alcohol/week: 4.0 standard drinks of alcohol     Types: 2 Glasses of wine, 2 Shots of liquor per week     Comment: occ    Drug use: Never     For further details please see Health History Questionnaire scanned in Epic.  Medication List  @medcurrent@  Allergies  No Known Allergies  Review of Systems  All systems reviewed and are otherwise negative, pertinent positives noted in the HPI and Health History Questionnaire scanned in Epic.    Physical Exam  General:  No acute distress  Head: Normocephalic, atraumatic  Neuro:  Alert and oriented      External anorectal exam: Deferred due to pain and will be done in the operating room     CT ABDOMEN PELVIS W CONTRAST     Date of Exam: 2/16/2024 3:47 PM EST     Indication: abscess buttock.     Comparison: None     Technique: Axial CT images were obtained of the abdomen and pelvis following the uneventful intravenous administration of iodinated contrast. Sagittal and coronal reconstructions were performed.  Automated exposure control and iterative reconstruction   methods were used.           Findings:  Visualized Chest: 1.8 cm nodule within the right lower lobe with a small focal calcification (image 14 of series 3). Otherwise unremarkable     Liver: Liver is normal in size and CT density. No focal lesions.     Gallbladder: The gallbladder is normal without evidence of radiopaque stones.     Bile Ducts: No billiary dcutal dilation.     Spleen: Spleen is normal in size and CT density.     Pancreas: Pancreas is normal. There is no evidence of pancreatic mass or peripancreatic fluid.     Adrenals: Adrenal glands are unremarkable.     Kidneys: Kidneys are normal in size. There are no stones or hydronephrosis.     Gastrointestinal: Oral contrast is present within the small bowel. No dilated loops of bowel to suggest bowel obstruction. No acute inflammatory changes of the intra-abdominal intrapelvic GI tract.     Bladder: The bladder is normal.     Pelvis: Lobulated and heterogeneous uterus, most significantly at the fundus with a possible 5.6 x 5.8 cm mass. Otherwise     Peritoneum/Mesentery: No fluid collection, ascities, or free air.      Lymph Nodes: No lymphadenopathy.     Vasculature: Unremarkable     Abdominal Wall: Within the right medial aspect of the pelvis soft tissue/medial thigh adjacent to the anus, there is a 7.0 x 4.8 x 6.8 cm fluid collection containing a small locule of gas (image 150 of series 3). This fluid collection abuts the right   lateral aspect of the anus. There is  surrounding soft tissue fat stranding.     Bony Structures: No acute osseous abnormality     IMPRESSION:  Impression:  Within the right medial aspect of the pelvic soft tissue/medial thigh adjacent to the anus there is a 7 cm fluid collection containing a small locule of gas. This most likely represents a perianal abscess. Infection with a gas-forming organism is not   completely excluded.     Incidentally noted 1.8 cm nodule within the right lower lobe with a small focal calcification. While this may represent a developing calcified granuloma, this may also represent a discrete pulmonary nodule. Please consider follow-up with dedicated PET/CT   or tissue sampling. Alternatively a repeat chest CT in 3 months can be obtained.     Heterogeneous and enlarged uterus with a possible 5.6 cm mass within the uterine fundus. This most likely represents a uterine fibroid.    Assessment  -Ischiorectal abscess  -Anorectal pain   - constipation     Plan / Recommendations    1. The patient has an ischiorectal abscess in the right lateral region requiring drainage. I have added her to the operating room schedule for today.I explained that there is a chance that this could develop into a fistula which would require surgery and that we would only be able to make that determination after 3-4 weeks.     Risk of procedure include, but are not limited to: bleeding, wound infection, pelvic infection, urinary retention possibly requiring a crowe catheter. Patient understands there is always the risk of incontinence to gas or stool with any anorectal surgery, but great pains will be taken to minimize this risk. Further treatments or operations may be necessary as the situation dictates.    Patient demonstrates understanding and all questions are answered. Patient consents to go ahead with proposed procedure.     2. Ok to discharge after procedure home     3. We will have patient follow-up with us in 3-4 weeks for repeat evaluation. Patient  is instructed to call our office promptly if she develops any fevers or worsening pain.    4. Continue NPO until surgery     I have personally reviewed her CT showed 7cm ischiorectal abscess. I have personally reviewed her lab showing leukocytosis and hyperglycemia.       Javier Will MD  Colon and Rectal Surgery   [facility]         Electronically signed by Javier Will MD at 24 [phone]          Discharge Summary        Jethro Figueroa MD at 24 1126                       [facility]  Discharge Summary    Date of Service: 2024  Patient Name: Gena Harper  : 1965  MRN: [id number]    Date of Admission: 2024  Discharge Diagnosis: Ischiorectal rectal abscess, pulmonary nodule incidental finding  Date of Discharge: 2024  Primary Care Physician: Day Cardoso APRN      Presenting Problem:   Intramural leiomyoma of uterus [D25.1]  Perianal abscess [K61.0]  Rectal or anal pain [K62.89]    Active and Resolved Hospital Problems:  Active Hospital Problems    Diagnosis POA    **Perianal abscess [K61.0] Yes      Resolved Hospital Problems   No resolved problems to display.         Hospital Course     HPI:  Admitting team HPI  Gena Harper is a 58 y.o. female with past medical history of DM2, hypertension who presented to [facility] on 2024 complaining of rectal pain since Tuesday.  Patient states she was feeling congested with mild headache and had some chills.  She started noticing swelling and pain around the anal area afterwards.  Thought again she was still having fever secondary to the URI however swelling started getting worse with more fevers chills.  She went to urgent care and was told to come to the ER for perirectal abscess.  CT abdomen pelvis in the ER showing perianal abscess, general surgery was consulted for drainage tomorrow.  Patient denies any associated abdominal pain, nausea, vomiting, diarrhea, dysuria, chest pain,  shortness of breath.  Does admit to being diabetic for past few years, was never on insulin before.  Does not check blood glucose at home regularly.     Hospital Course:  Ischiorectal rectal abscess  -CT abdomen pelvis findings noted Concern for gas-forming bacteria as well  -CRS consulted status post drainage bed side drainage. Tolerated will discharge on po augmentin as recommended by CRS along with bowel regimen and pain medications        #Pulmonary nodule  1.8 cm nodule within right lower lobe small focal calcification.  Discussed with patient and family.  Advised repeat CT scan of lungs in 3 months     35 mins of my time spent on discharging this patient     DISCHARGE Follow Up Recommendations for labs and diagnostics: follow up with CRS. Repeat CT chest in 3 months       Reasons For Change In Medications and Indications for New Medications:      Day of Discharge     Vital Signs:  Temp:  [97.6 °F (36.4 °C)-98.7 °F (37.1 °C)] 97.6 °F (36.4 °C)  Heart Rate:  [] 100  Resp:  [16-20] 16  BP: (100-160)/(56-87) 121/65    Physical Exam:  Physical Exam   AOX3 nAD   RRR, S1 & S2  Lungs CTA   ABD soft, NT, ND       Pertinent  and/or Most Recent Results     LAB RESULTS:      Lab 02/16/24 2227 02/16/24  1406   WBC 13.50* 11.90*   HEMOGLOBIN 9.7* 10.0*   HEMATOCRIT 30.3* 31.3*   PLATELETS 241 242   NEUTROS ABS 10.00* 9.30*   LYMPHS ABS 2.30 1.80   MONOS ABS 1.10* 0.70   EOS ABS 0.10 0.00   MCV 81.4 79.1   PROTIME 10.8  --          Lab 02/16/24 2227 02/16/24  1406   SODIUM 138 139   POTASSIUM 3.4* 3.7   CHLORIDE 102 102   CO2 26.0 26.0   ANION GAP 10.0 11.0   BUN 10 14   CREATININE 0.66 0.80   EGFR 102.5 86.1   GLUCOSE 193* 142*   CALCIUM 8.9 9.3   HEMOGLOBIN A1C  --  5.90*         Lab 02/16/24  1406   TOTAL PROTEIN 7.4   ALBUMIN 3.8   GLOBULIN 3.6   ALT (SGPT) 18   AST (SGOT) 16   BILIRUBIN 0.3   ALK PHOS 113         Lab 02/16/24 2227   PROTIME 10.8   INR 0.99                 Brief Urine Lab Results       None           Microbiology Results (last 10 days)       ** No results found for the last 240 hours. **            CT Abdomen Pelvis With Contrast    Result Date: 2/16/2024  Impression: Impression: Within the right medial aspect of the pelvic soft tissue/medial thigh adjacent to the anus there is a 7 cm fluid collection containing a small locule of gas. This most likely represents a perianal abscess. Infection with a gas-forming organism is not completely excluded. Incidentally noted 1.8 cm nodule within the right lower lobe with a small focal calcification. While this may represent a developing calcified granuloma, this may also represent a discrete pulmonary nodule. Please consider follow-up with dedicated PET/CT  or tissue sampling. Alternatively a repeat chest CT in 3 months can be obtained. Heterogeneous and enlarged uterus with a possible 5.6 cm mass within the uterine fundus. This most likely represents a uterine fibroid. Electronically Signed: Jose Ly DO  2/16/2024 4:03 PM EST  Workstation ID: [id number]                 Labs Pending at Discharge:  Pending Labs       Order Current Status    Blood Culture - Blood, Arm, Left In process    Blood Culture - Blood, Arm, Right In process            Procedures Performed  Procedure(s):  INCISION AND DRAINAGE OF PERIRECTAL ABSCESS WITH POSSIBLE SETON PLACEMENT - PRONE         Consults:   Consults       No orders found for last 30 day(s).              Discharge Details        Discharge Medications        ASK your doctor about these medications        Instructions Start Date   atorvastatin 20 MG tablet  Commonly known as: LIPITOR   20 mg, Oral, Daily      CALTRATE 600 PO   1 tablet, Oral, Daily      Claritin 10 MG tablet  Generic drug: loratadine   10 mg, Oral, Daily      ferrous sulfate 325 (65 FE) MG tablet   325 mg, Oral, Daily With Breakfast      fish oil 1000 MG capsule capsule   1,000 mg, Oral, Daily With Breakfast      Garlic 100 MG tablet   100 mg, Oral, Daily       glucosamine-chondroitin 500-400 MG capsule capsule   1 capsule, Oral, Daily      ibuprofen 200 MG tablet  Commonly known as: ADVIL,MOTRIN   200 mg, Oral, Every 6 Hours PRN      lisinopril 20 MG tablet  Commonly known as: PRINIVIL,ZESTRIL   20 mg, Oral, Daily      PARoxetine 10 MG tablet  Commonly known as: PAXIL   10 mg, Oral, Daily      Rybelsus 7 MG tablet  Generic drug: Semaglutide   TAKE 1 TABLET BY MOUTH EVERY MORNING BEFORE BREAKFAST      Vitamin D (Cholecalciferol) 10 MCG (400 UNIT) tablet  Commonly known as: CHOLECALCIFEROL   400 Units, Oral, Daily      Zinc 100 MG tablet   100 mg, Oral, Daily               No Known Allergies      Discharge Disposition: Home       Diet:  Hospital:  Diet Order   Procedures    NPO Diet NPO Type: Strict NPO         Discharge Activity:         CODE STATUS:  Code Status and Medical Interventions:   Ordered at: 02/16/24 1746     Code Status (Patient has no pulse and is not breathing):    CPR (Attempt to Resuscitate)     Medical Interventions (Patient has pulse or is breathing):    Full Support         Future Appointments   Date Time Provider Department Center   2/22/2024  7:30 AM [facility]   7/23/2024  8:15 AM NURSE/MA [facility] [place] [facility]   7/31/2024  8:00 AM Day Cardoso APRN [facility]           Time spent on Discharge including face to face service:  >30 minutes    Signature: Electronically signed by Jethro Rodriguez MD, 02/17/24, 11:[facility]      Electronically signed by Jethro Rodriguez MD at 02/17/24 1636       Discharge Order (From admission, onward)       Start     Ordered    02/17/24 1632  Discharge patient  Once        Expected Discharge Date: 02/17/24   Discharge Disposition: Home or Self Care   Physician of Record for Attribution - Please select from Treatment Team: JETHRO RODRIGUEZ [[id number]]   Review needed by CMO to determine Physician of Record: No      Question Answer Comment   Physician of  Record for Attribution - Please select from Treatment Team RACHELLE RODRIGUEZ    Review needed by CMO to determine Physician of Record No        02/17/24 9031

## 2024-02-20 ENCOUNTER — TELEPHONE (OUTPATIENT)
Age: 59
End: 2024-02-20
Payer: COMMERCIAL

## 2024-02-20 NOTE — TELEPHONE ENCOUNTER
Call placed to patient to advise of need for follow up in office, appt given for 4 weeks per Dr. Will. Patient concerned about keeping area drained covered and clean. Trying to keep dressing on it having trouble and concerned with feces getting into incision. Advised can use larger dressing if feeling not getting covered and when wiping after bowel movement to only use cotton with water and not to use commercial wipes as they irritate the area. Patient expressed understanding of all discussed.

## 2024-02-21 LAB
BACTERIA SPEC AEROBE CULT: NORMAL
BACTERIA SPEC AEROBE CULT: NORMAL

## 2024-02-22 ENCOUNTER — HOSPITAL ENCOUNTER (OUTPATIENT)
Dept: MAMMOGRAPHY | Facility: HOSPITAL | Age: 59
Discharge: HOME | End: 2024-02-22
Admitting: NURSE PRACTITIONER
Payer: COMMERCIAL

## 2024-02-22 DIAGNOSIS — Z12.31 BREAST CANCER SCREENING BY MAMMOGRAM: ICD-10-CM

## 2024-02-22 PROCEDURE — 77067 SCR MAMMO BI INCL CAD: CPT

## 2024-02-22 PROCEDURE — 77063 BREAST TOMOSYNTHESIS BI: CPT

## 2024-02-23 ENCOUNTER — OFFICE VISIT (OUTPATIENT)
Dept: FAMILY MEDICINE CLINIC | Facility: CLINIC | Age: 59
End: 2024-02-23
Payer: COMMERCIAL

## 2024-02-23 VITALS
RESPIRATION RATE: 18 BRPM | TEMPERATURE: 98.2 F | DIASTOLIC BLOOD PRESSURE: 94 MMHG | HEIGHT: 64 IN | OXYGEN SATURATION: 95 % | SYSTOLIC BLOOD PRESSURE: 140 MMHG | BODY MASS INDEX: 39.78 KG/M2 | HEART RATE: 84 BPM | WEIGHT: 233 LBS

## 2024-02-23 DIAGNOSIS — Z09 HOSPITAL DISCHARGE FOLLOW-UP: Primary | ICD-10-CM

## 2024-02-23 DIAGNOSIS — N85.2 ENLARGED UTERUS: ICD-10-CM

## 2024-02-23 DIAGNOSIS — R91.1 PULMONARY NODULE: ICD-10-CM

## 2024-02-26 ENCOUNTER — CLINICAL SUPPORT (OUTPATIENT)
Dept: FAMILY MEDICINE CLINIC | Facility: CLINIC | Age: 59
End: 2024-02-26
Payer: COMMERCIAL

## 2024-02-26 DIAGNOSIS — Z09 HOSPITAL DISCHARGE FOLLOW-UP: ICD-10-CM

## 2024-02-26 PROCEDURE — 80053 COMPREHEN METABOLIC PANEL: CPT | Performed by: NURSE PRACTITIONER

## 2024-02-26 PROCEDURE — 85025 COMPLETE CBC W/AUTO DIFF WBC: CPT | Performed by: NURSE PRACTITIONER

## 2024-02-26 PROCEDURE — 36415 COLL VENOUS BLD VENIPUNCTURE: CPT | Performed by: NURSE PRACTITIONER

## 2024-02-26 NOTE — PROGRESS NOTES
Venipuncture Blood Specimen Collection  Venipuncture performed in the left arm by Tammy Garcia MA with good hemostasis. Patient tolerated the procedure well without complications.   02/26/24   Tammy Garcia MA

## 2024-02-27 LAB
ALBUMIN SERPL-MCNC: 4.2 G/DL (ref 3.5–5.2)
ALBUMIN/GLOB SERPL: 1.4 G/DL
ALP SERPL-CCNC: 110 U/L (ref 39–117)
ALT SERPL W P-5'-P-CCNC: 28 U/L (ref 1–33)
ANION GAP SERPL CALCULATED.3IONS-SCNC: 9 MMOL/L (ref 5–15)
AST SERPL-CCNC: 17 U/L (ref 1–32)
BASOPHILS # BLD AUTO: 0.03 10*3/MM3 (ref 0–0.2)
BASOPHILS NFR BLD AUTO: 0.3 % (ref 0–1.5)
BILIRUB SERPL-MCNC: 0.2 MG/DL (ref 0–1.2)
BUN SERPL-MCNC: 15 MG/DL (ref 6–20)
BUN/CREAT SERPL: 18.1 (ref 7–25)
CALCIUM SPEC-SCNC: 9.5 MG/DL (ref 8.6–10.5)
CHLORIDE SERPL-SCNC: 105 MMOL/L (ref 98–107)
CO2 SERPL-SCNC: 28 MMOL/L (ref 22–29)
CREAT SERPL-MCNC: 0.83 MG/DL (ref 0.57–1)
DEPRECATED RDW RBC AUTO: 43.1 FL (ref 37–54)
EGFRCR SERPLBLD CKD-EPI 2021: 82.3 ML/MIN/1.73
EOSINOPHIL # BLD AUTO: 0.19 10*3/MM3 (ref 0–0.4)
EOSINOPHIL NFR BLD AUTO: 2 % (ref 0.3–6.2)
ERYTHROCYTE [DISTWIDTH] IN BLOOD BY AUTOMATED COUNT: 15 % (ref 12.3–15.4)
GLOBULIN UR ELPH-MCNC: 3.1 GM/DL
GLUCOSE SERPL-MCNC: 98 MG/DL (ref 65–99)
HCT VFR BLD AUTO: 34.3 % (ref 34–46.6)
HGB BLD-MCNC: 11 G/DL (ref 12–15.9)
IMM GRANULOCYTES # BLD AUTO: 0.2 10*3/MM3 (ref 0–0.05)
IMM GRANULOCYTES NFR BLD AUTO: 2.2 % (ref 0–0.5)
LYMPHOCYTES # BLD AUTO: 3.19 10*3/MM3 (ref 0.7–3.1)
LYMPHOCYTES NFR BLD AUTO: 34.3 % (ref 19.6–45.3)
MCH RBC QN AUTO: 26 PG (ref 26.6–33)
MCHC RBC AUTO-ENTMCNC: 32.1 G/DL (ref 31.5–35.7)
MCV RBC AUTO: 81.1 FL (ref 79–97)
MONOCYTES # BLD AUTO: 0.53 10*3/MM3 (ref 0.1–0.9)
MONOCYTES NFR BLD AUTO: 5.7 % (ref 5–12)
NEUTROPHILS NFR BLD AUTO: 5.15 10*3/MM3 (ref 1.7–7)
NEUTROPHILS NFR BLD AUTO: 55.5 % (ref 42.7–76)
NRBC BLD AUTO-RTO: 0 /100 WBC (ref 0–0.2)
PLATELET # BLD AUTO: 339 10*3/MM3 (ref 140–450)
PMV BLD AUTO: 10.1 FL (ref 6–12)
POTASSIUM SERPL-SCNC: 4.6 MMOL/L (ref 3.5–5.2)
PROT SERPL-MCNC: 7.3 G/DL (ref 6–8.5)
RBC # BLD AUTO: 4.23 10*6/MM3 (ref 3.77–5.28)
SODIUM SERPL-SCNC: 142 MMOL/L (ref 136–145)
WBC NRBC COR # BLD AUTO: 9.29 10*3/MM3 (ref 3.4–10.8)

## 2024-03-20 ENCOUNTER — OFFICE VISIT (OUTPATIENT)
Age: 59
End: 2024-03-20
Payer: COMMERCIAL

## 2024-03-20 VITALS
HEART RATE: 68 BPM | SYSTOLIC BLOOD PRESSURE: 136 MMHG | OXYGEN SATURATION: 98 % | TEMPERATURE: 97.5 F | HEIGHT: 64 IN | WEIGHT: 230 LBS | BODY MASS INDEX: 39.27 KG/M2 | DIASTOLIC BLOOD PRESSURE: 77 MMHG

## 2024-03-20 DIAGNOSIS — K61.0 PERIANAL ABSCESS: Primary | ICD-10-CM

## 2024-03-20 PROCEDURE — 99213 OFFICE O/P EST LOW 20 MIN: CPT | Performed by: STUDENT IN AN ORGANIZED HEALTH CARE EDUCATION/TRAINING PROGRAM

## 2024-03-20 PROCEDURE — 46600 DIAGNOSTIC ANOSCOPY SPX: CPT | Performed by: STUDENT IN AN ORGANIZED HEALTH CARE EDUCATION/TRAINING PROGRAM

## 2024-06-03 DIAGNOSIS — E11.65 TYPE 2 DIABETES MELLITUS WITH HYPERGLYCEMIA, WITHOUT LONG-TERM CURRENT USE OF INSULIN: ICD-10-CM

## 2024-06-04 RX ORDER — ORAL SEMAGLUTIDE 7 MG/1
TABLET ORAL
Qty: 90 TABLET | Refills: 1 | Status: SHIPPED | OUTPATIENT
Start: 2024-06-04

## 2024-07-17 DIAGNOSIS — E11.65 TYPE 2 DIABETES MELLITUS WITH HYPERGLYCEMIA, WITHOUT LONG-TERM CURRENT USE OF INSULIN: Primary | ICD-10-CM

## 2024-07-17 DIAGNOSIS — I10 PRIMARY HYPERTENSION: ICD-10-CM

## 2024-07-17 DIAGNOSIS — E78.2 MULTIPLE-TYPE HYPERLIPIDEMIA: ICD-10-CM

## 2024-07-23 ENCOUNTER — LAB (OUTPATIENT)
Dept: FAMILY MEDICINE CLINIC | Facility: CLINIC | Age: 59
End: 2024-07-23
Payer: COMMERCIAL

## 2024-07-23 PROCEDURE — 80050 GENERAL HEALTH PANEL: CPT | Performed by: NURSE PRACTITIONER

## 2024-07-23 PROCEDURE — 80061 LIPID PANEL: CPT | Performed by: NURSE PRACTITIONER

## 2024-07-23 PROCEDURE — 83036 HEMOGLOBIN GLYCOSYLATED A1C: CPT | Performed by: NURSE PRACTITIONER

## 2024-07-24 LAB
ALBUMIN SERPL-MCNC: 4.3 G/DL (ref 3.5–5.2)
ALBUMIN/GLOB SERPL: 1.4 G/DL
ALP SERPL-CCNC: 100 U/L (ref 39–117)
ALT SERPL W P-5'-P-CCNC: 16 U/L (ref 1–33)
ANION GAP SERPL CALCULATED.3IONS-SCNC: 9.1 MMOL/L (ref 5–15)
AST SERPL-CCNC: 14 U/L (ref 1–32)
BILIRUB SERPL-MCNC: 0.3 MG/DL (ref 0–1.2)
BUN SERPL-MCNC: 15 MG/DL (ref 6–20)
BUN/CREAT SERPL: 20.5 (ref 7–25)
CALCIUM SPEC-SCNC: 9.5 MG/DL (ref 8.6–10.5)
CHLORIDE SERPL-SCNC: 104 MMOL/L (ref 98–107)
CHOLEST SERPL-MCNC: 197 MG/DL (ref 0–200)
CO2 SERPL-SCNC: 25.9 MMOL/L (ref 22–29)
CREAT SERPL-MCNC: 0.73 MG/DL (ref 0.57–1)
DEPRECATED RDW RBC AUTO: 44.2 FL (ref 37–54)
EGFRCR SERPLBLD CKD-EPI 2021: 96.1 ML/MIN/1.73
ERYTHROCYTE [DISTWIDTH] IN BLOOD BY AUTOMATED COUNT: 15.2 % (ref 12.3–15.4)
GLOBULIN UR ELPH-MCNC: 3.1 GM/DL
GLUCOSE SERPL-MCNC: 96 MG/DL (ref 65–99)
HBA1C MFR BLD: 6.6 % (ref 4.8–5.6)
HCT VFR BLD AUTO: 39.1 % (ref 34–46.6)
HDLC SERPL-MCNC: 58 MG/DL (ref 40–60)
HGB BLD-MCNC: 12.6 G/DL (ref 12–15.9)
LDLC SERPL CALC-MCNC: 97 MG/DL (ref 0–100)
LDLC/HDLC SERPL: 1.54 {RATIO}
MCH RBC QN AUTO: 26 PG (ref 26.6–33)
MCHC RBC AUTO-ENTMCNC: 32.2 G/DL (ref 31.5–35.7)
MCV RBC AUTO: 80.8 FL (ref 79–97)
PLATELET # BLD AUTO: 237 10*3/MM3 (ref 140–450)
PMV BLD AUTO: 10.8 FL (ref 6–12)
POTASSIUM SERPL-SCNC: 4.5 MMOL/L (ref 3.5–5.2)
PROT SERPL-MCNC: 7.4 G/DL (ref 6–8.5)
RBC # BLD AUTO: 4.84 10*6/MM3 (ref 3.77–5.28)
SODIUM SERPL-SCNC: 139 MMOL/L (ref 136–145)
TRIGL SERPL-MCNC: 248 MG/DL (ref 0–150)
TSH SERPL DL<=0.05 MIU/L-ACNC: 2.1 UIU/ML (ref 0.27–4.2)
VLDLC SERPL-MCNC: 42 MG/DL (ref 5–40)
WBC NRBC COR # BLD AUTO: 8.14 10*3/MM3 (ref 3.4–10.8)

## 2024-07-31 ENCOUNTER — OFFICE VISIT (OUTPATIENT)
Dept: FAMILY MEDICINE CLINIC | Facility: CLINIC | Age: 59
End: 2024-07-31
Payer: COMMERCIAL

## 2024-07-31 VITALS
WEIGHT: 235 LBS | RESPIRATION RATE: 18 BRPM | HEIGHT: 64 IN | DIASTOLIC BLOOD PRESSURE: 84 MMHG | BODY MASS INDEX: 40.12 KG/M2 | TEMPERATURE: 97.8 F | SYSTOLIC BLOOD PRESSURE: 128 MMHG | HEART RATE: 70 BPM | OXYGEN SATURATION: 95 %

## 2024-07-31 DIAGNOSIS — E78.2 MULTIPLE-TYPE HYPERLIPIDEMIA: ICD-10-CM

## 2024-07-31 DIAGNOSIS — R91.1 PULMONARY NODULE: ICD-10-CM

## 2024-07-31 DIAGNOSIS — E11.65 TYPE 2 DIABETES MELLITUS WITH HYPERGLYCEMIA, WITHOUT LONG-TERM CURRENT USE OF INSULIN: Primary | ICD-10-CM

## 2024-07-31 DIAGNOSIS — E11.9 ENCOUNTER FOR DIABETIC FOOT EXAM: ICD-10-CM

## 2024-07-31 DIAGNOSIS — Z86.2 HISTORY OF ANEMIA: ICD-10-CM

## 2024-07-31 DIAGNOSIS — F41.9 ANXIETY: ICD-10-CM

## 2024-07-31 DIAGNOSIS — Z00.00 PREVENTATIVE HEALTH CARE: ICD-10-CM

## 2024-07-31 DIAGNOSIS — R41.89 BRAIN FOG: ICD-10-CM

## 2024-07-31 DIAGNOSIS — D25.9 UTERINE LEIOMYOMA, UNSPECIFIED LOCATION: ICD-10-CM

## 2024-07-31 DIAGNOSIS — I10 PRIMARY HYPERTENSION: ICD-10-CM

## 2024-07-31 PROCEDURE — 99396 PREV VISIT EST AGE 40-64: CPT | Performed by: NURSE PRACTITIONER

## 2024-07-31 RX ORDER — SODIUM PHOSPHATE,MONO-DIBASIC 19G-7G/118
ENEMA (ML) RECTAL
COMMUNITY

## 2024-07-31 RX ORDER — PAROXETINE 10 MG/1
10 TABLET, FILM COATED ORAL DAILY
Qty: 90 TABLET | Refills: 1 | Status: SHIPPED | OUTPATIENT
Start: 2024-07-31

## 2024-07-31 RX ORDER — LANOLIN ALCOHOL/MO/W.PET/CERES
1000 CREAM (GRAM) TOPICAL DAILY
COMMUNITY

## 2024-07-31 RX ORDER — ATORVASTATIN CALCIUM 20 MG/1
20 TABLET, FILM COATED ORAL DAILY
Qty: 90 TABLET | Refills: 1 | Status: SHIPPED | OUTPATIENT
Start: 2024-07-31

## 2024-07-31 RX ORDER — LISINOPRIL 20 MG/1
20 TABLET ORAL DAILY
Qty: 90 TABLET | Refills: 1 | Status: SHIPPED | OUTPATIENT
Start: 2024-07-31

## 2024-07-31 NOTE — PROGRESS NOTES
Chief Complaint  Chief Complaint   Patient presents with    Annual Exam     Last pap smear 03/28/2024 w. Dr. Ch, reported normal    Diabetes     Does not check bs at home. Last eye exam was years ago, scheduled w. [facility] in 12/2024. Foot exam performed today and wnl.    Hypertension           Subjective          Gena Harper presents to [facility] for   History of Present Illness    Patient presents for annual exam. Pap completed March 28 per Dr. Ch.  Mammogram and Cologuard up to date    Patient developed perianal abscess, had CT abdomen pelvis 2/16/2024, then required drainage per colorectal surgery. A right lower lobe 1.8 cm nodule was incidentally noted along with uterine fibroid, CT of the chest followed up 6/20/24 with no change in size of nodule.     HTN, stable on meds and takes as directed, denies chest pain, headache, shortness of air, palpitations and swelling of extremities.     Diabetes mellitus type II, feels stable on Rybelsus, she does report occasional nausea but overall tolerating medication well.  Denies any signs/symptoms of hyper/hypoglycemia, blurry vision, polydipsia, polyuria, nocturia, and unintentional weight loss      Anxiety, patient is stable on Paxil.  She is stressed with work, has a lot going on, reports brain fog and frequently forgets things or tasks she asked to do.      Hyperlipidemia, takes fish oil 1/day, garlique, flaxseed and statin. The patient denies muscle aches, constipation, diarrhea, GI upset, fatigue, chest pain/pressure, exercise intolerance, dyspnea, palpitations, syncope and pedal edema.       Here to review labs      The following portions of the patient's history were reviewed and updated as appropriate: allergies, current medications, past family history, past medical history, past social history, past surgical history and problem list.    Past Medical History:   Diagnosis Date    Breast mass 07/2018    Cataract      Delivery of      Diabetes mellitus     Fibrocystic breast     Hypertension      Past Surgical History:   Procedure Laterality Date    BREAST BIOPSY Right 2018    core    CATARACT EXTRACTION Right     MOUTH SURGERY      Top 2 molar/oral surgery    PERINEAL/ANAL CONDYLOMA EXCISION/FULGURATION       Family History   Problem Relation Age of Onset    Heart disease Father     Kidney disease Father     Diabetes Maternal Grandmother     Breast cancer Maternal Grandmother     Breast cancer Sister 44         at 48    Cancer Sister         Breast cancer -  2012     Social History     Tobacco Use    Smoking status: Never     Passive exposure: Never    Smokeless tobacco: Never   Substance Use Topics    Alcohol use: Yes     Alcohol/week: 4.0 standard drinks of alcohol     Types: 2 Glasses of wine, 2 Shots of liquor per week     Comment: occ       Current Outpatient Medications:     ascorbic acid (VITAMIN C) 1000 MG tablet, , Disp: , Rfl:     atorvastatin (LIPITOR) 20 MG tablet, Take 1 tablet by mouth Daily., Disp: 90 tablet, Rfl: 1    Calcium Carbonate (CALTRATE 600 PO), Take 1 tablet by mouth Daily., Disp: , Rfl:     ferrous sulfate 325 (65 FE) MG tablet, Take 1 tablet by mouth Daily With Breakfast., Disp: , Rfl:     Flaxseed, Linseed, (Flaxseed Oil) 1400 MG capsule, , Disp: , Rfl:     Garlic 100 MG tablet, Take 100 mg by mouth Daily., Disp: , Rfl:     glucosamine-chondroitin 500-400 MG capsule capsule, Take  by mouth 3 (Three) Times a Day With Meals., Disp: , Rfl:     ibuprofen (ADVIL,MOTRIN) 200 MG tablet, Take 1 tablet by mouth Every 6 (Six) Hours As Needed for Mild Pain., Disp: , Rfl:     lisinopril (PRINIVIL,ZESTRIL) 20 MG tablet, Take 1 tablet by mouth Daily., Disp: 90 tablet, Rfl: 1    loratadine (Claritin) 10 MG tablet, Take 1 tablet by mouth Daily., Disp: , Rfl:     Multiple Vitamins-Minerals (ZINC PO), Take  by mouth Daily., Disp: , Rfl:     Omega-3 Fatty Acids (fish oil) 1000 MG  "capsule capsule, Take 1 capsule by mouth 2 (Two) Times a Day With Meals., Disp: , Rfl:     PARoxetine (PAXIL) 10 MG tablet, Take 1 tablet by mouth Daily., Disp: 90 tablet, Rfl: 1    Psyllium (METAMUCIL 4 IN 1 FIBER PO), As Needed (constipation)., Disp: , Rfl:     Rhubarb (ESTROVEN COMPLETE PO), Take  by mouth Daily., Disp: , Rfl:     Rybelsus 7 MG tablet, TAKE 1 TABLET BY MOUTH EVERY MORNING BEFORE BREAKFAST, Disp: 90 tablet, Rfl: 1    vitamin B-12 (CYANOCOBALAMIN) 1000 MCG tablet, Take 1 tablet by mouth Daily., Disp: , Rfl:     Vitamin D, Cholecalciferol, (CHOLECALCIFEROL) 10 MCG (400 UNIT) tablet, Take 1 tablet by mouth Daily., Disp: , Rfl:     Objective   Vital Signs:   /84 (BP Location: Left arm, Patient Position: Sitting, Cuff Size: Large Adult)   Pulse 70   Temp 97.8 °F (36.6 °C) (Oral)   Resp 18   Ht 162.6 cm (64\")   Wt 107 kg (235 lb)   SpO2 95% Comment: Room air  BMI 40.34 kg/m²           Physical Exam  Constitutional:       General: She is not in acute distress.     Appearance: Normal appearance. She is well-developed. She is obese. She is not ill-appearing or diaphoretic.   HENT:      Head: Normocephalic.   Eyes:      Conjunctiva/sclera: Conjunctivae normal.      Pupils: Pupils are equal, round, and reactive to light.   Neck:      Thyroid: No thyromegaly.      Vascular: No JVD.   Cardiovascular:      Rate and Rhythm: Normal rate and regular rhythm.      Pulses:           Dorsalis pedis pulses are 3+ on the right side and 3+ on the left side.        Posterior tibial pulses are 3+ on the right side and 3+ on the left side.      Heart sounds: Normal heart sounds. No murmur heard.  Pulmonary:      Effort: Pulmonary effort is normal. No respiratory distress.      Breath sounds: Normal breath sounds. No wheezing or rhonchi.   Abdominal:      General: Bowel sounds are normal. There is no distension.      Palpations: Abdomen is soft.      Tenderness: There is no abdominal tenderness. "   Musculoskeletal:         General: No swelling or tenderness. Normal range of motion.      Cervical back: Normal range of motion and neck supple. No tenderness.      Right foot: Normal range of motion. No deformity or bunion.      Left foot: Normal range of motion. No deformity or bunion.   Feet:      Right foot:      Protective Sensation: 10 sites tested.  10 sites sensed.      Skin integrity: Skin integrity normal. No ulcer, blister, skin breakdown, erythema, warmth, callus, dry skin or fissure.      Toenail Condition: Right toenails are normal.      Left foot:      Protective Sensation: 10 sites tested.  10 sites sensed.      Skin integrity: Skin integrity normal. No ulcer, blister, skin breakdown, erythema, warmth, callus, dry skin or fissure.      Toenail Condition: Left toenails are normal.      Comments: Diabetic Foot Exam Performed and Monofilament Test Performed    Lymphadenopathy:      Cervical: No cervical adenopathy.   Skin:     General: Skin is warm and dry.      Coloration: Skin is not jaundiced.      Findings: No erythema or rash.   Neurological:      General: No focal deficit present.      Mental Status: She is alert and oriented to person, place, and time. Mental status is at baseline.      Sensory: No sensory deficit.      Motor: No weakness.      Gait: Gait normal.   Psychiatric:         Mood and Affect: Mood normal.         Behavior: Behavior normal.         Thought Content: Thought content normal.         Judgment: Judgment normal.          Result Review :     No visits with results within 7 Day(s) from this visit.   Latest known visit with results is:   Orders Only on 07/17/2024   Component Date Value Ref Range Status    Glucose 07/23/2024 96  65 - 99 mg/dL Final    BUN 07/23/2024 15  6 - 20 mg/dL Final    Creatinine 07/23/2024 0.73  0.57 - 1.00 mg/dL Final    Sodium 07/23/2024 139  136 - 145 mmol/L Final    Potassium 07/23/2024 4.5  3.5 - 5.2 mmol/L Final    Chloride 07/23/2024 104  98 - 107  mmol/L Final    CO2 07/23/2024 25.9  22.0 - 29.0 mmol/L Final    Calcium 07/23/2024 9.5  8.6 - 10.5 mg/dL Final    Total Protein 07/23/2024 7.4  6.0 - 8.5 g/dL Final    Albumin 07/23/2024 4.3  3.5 - 5.2 g/dL Final    ALT (SGPT) 07/23/2024 16  1 - 33 U/L Final    AST (SGOT) 07/23/2024 14  1 - 32 U/L Final    Alkaline Phosphatase 07/23/2024 100  39 - 117 U/L Final    Total Bilirubin 07/23/2024 0.3  0.0 - 1.2 mg/dL Final    Globulin 07/23/2024 3.1  gm/dL Final    A/G Ratio 07/23/2024 1.4  g/dL Final    BUN/Creatinine Ratio 07/23/2024 20.5  7.0 - 25.0 Final    Anion Gap 07/23/2024 9.1  5.0 - 15.0 mmol/L Final    eGFR 07/23/2024 96.1  >60.0 mL/min/1.73 Final    WBC 07/23/2024 8.14  3.40 - 10.80 10*3/mm3 Final    RBC 07/23/2024 4.84  3.77 - 5.28 10*6/mm3 Final    Hemoglobin 07/23/2024 12.6  12.0 - 15.9 g/dL Final    Hematocrit 07/23/2024 39.1  34.0 - 46.6 % Final    MCV 07/23/2024 80.8  79.0 - 97.0 fL Final    MCH 07/23/2024 26.0 (L)  26.6 - 33.0 pg Final    MCHC 07/23/2024 32.2  31.5 - 35.7 g/dL Final    RDW 07/23/2024 15.2  12.3 - 15.4 % Final    RDW-SD 07/23/2024 44.2  37.0 - 54.0 fl Final    MPV 07/23/2024 10.8  6.0 - 12.0 fL Final    Platelets 07/23/2024 237  140 - 450 10*3/mm3 Final    Hemoglobin A1C 07/23/2024 6.60 (H)  4.80 - 5.60 % Final    Total Cholesterol 07/23/2024 197  0 - 200 mg/dL Final    Triglycerides 07/23/2024 248 (H)  0 - 150 mg/dL Final    HDL Cholesterol 07/23/2024 58  40 - 60 mg/dL Final    LDL Cholesterol  07/23/2024 97  0 - 100 mg/dL Final    VLDL Cholesterol 07/23/2024 42 (H)  5 - 40 mg/dL Final    LDL/HDL Ratio 07/23/2024 1.54   Final    TSH 07/23/2024 2.100  0.270 - 4.200 uIU/mL Final                              Assessment and Plan    Diagnoses and all orders for this visit:    1. Type 2 diabetes mellitus with hyperglycemia, without long-term current use of insulin (Primary)  Comments:  A1c slightly worse at 6.6  Continue Rybelsus 7 mg  Work on improving diabetic diet and exercise    2.  Multiple-type hyperlipidemia  Comments:  Lipids stable w/ good HDL  cont atorvastatin, flax, fish oil to 2 g/day  rec strict HH and diabetic diet, increase exercise habits  Orders:  -     atorvastatin (LIPITOR) 20 MG tablet; Take 1 tablet by mouth Daily.  Dispense: 90 tablet; Refill: 1    3. Primary hypertension  Comments:  BP stable, Continue and refill lisinopril  Orders:  -     lisinopril (PRINIVIL,ZESTRIL) 20 MG tablet; Take 1 tablet by mouth Daily.  Dispense: 90 tablet; Refill: 1    4. Anxiety  Comments:  Stable, continue and refill Paxil   Orders:  -     PARoxetine (PAXIL) 10 MG tablet; Take 1 tablet by mouth Daily.  Dispense: 90 tablet; Refill: 1    5. Pulmonary nodule  Comments:  will monitor with 1 year repeat LDCT in june 2025.    6. Uterine leiomyoma, unspecified location  Comments:  No treatment necessary, will monitor    7. History of anemia  Comments:  H&H stable, continue OTC iron    8. Brain fog  Comments:  Likely 2/2 stress at work, rec turmeric or prevagin and brain stimulating puzzles    9. Preventative health care  Comments:  Labs reviewed with patient  Mammogram, Cologuard, Pap smear up-to-date  vaccines up to date    10. Encounter for diabetic foot exam        Perianal abscess resolved, notify for recurrence of symptoms  Age appropriate preventative counseling provided, including healthy lifestyle modifications and exercise      I spent 30 minutes caring for Gena Harper on this date of service. This time includes time spent by me in the following activities: preparing for the visit, reviewing tests, performing a medically appropriate examination and/or evaluation , counseling and educating the patient/family/caregiver, ordering medications, tests, or procedures and documenting information in the medical record        Follow Up     Return in about 6 months (around 1/31/2025) for Recheck HTN, DM. DM panel prior to appt .  Patient was given instructions and counseling regarding her condition  or for health maintenance advice. Please see specific information pulled into the AVS if appropriate.        Part of this note may be an electronic transcription/translation of spoken language to printed text using the Dragon Dictation System

## 2024-08-26 DIAGNOSIS — F41.9 ANXIETY: ICD-10-CM

## 2024-08-26 DIAGNOSIS — I10 PRIMARY HYPERTENSION: ICD-10-CM

## 2024-08-26 RX ORDER — LISINOPRIL 20 MG/1
20 TABLET ORAL DAILY
Qty: 90 TABLET | Refills: 1 | Status: SHIPPED | OUTPATIENT
Start: 2024-08-26

## 2024-08-26 RX ORDER — PAROXETINE 10 MG/1
10 TABLET, FILM COATED ORAL DAILY
Qty: 90 TABLET | Refills: 1 | Status: SHIPPED | OUTPATIENT
Start: 2024-08-26

## 2025-01-05 DIAGNOSIS — E11.65 TYPE 2 DIABETES MELLITUS WITH HYPERGLYCEMIA, WITHOUT LONG-TERM CURRENT USE OF INSULIN: ICD-10-CM

## 2025-01-07 RX ORDER — ORAL SEMAGLUTIDE 7 MG/1
TABLET ORAL
Qty: 90 TABLET | Refills: 1 | Status: SHIPPED | OUTPATIENT
Start: 2025-01-07

## 2025-01-15 DIAGNOSIS — E78.2 MULTIPLE-TYPE HYPERLIPIDEMIA: ICD-10-CM

## 2025-01-15 DIAGNOSIS — I10 PRIMARY HYPERTENSION: ICD-10-CM

## 2025-01-15 DIAGNOSIS — E11.65 TYPE 2 DIABETES MELLITUS WITH HYPERGLYCEMIA, WITHOUT LONG-TERM CURRENT USE OF INSULIN: Primary | ICD-10-CM

## 2025-01-27 ENCOUNTER — LAB (OUTPATIENT)
Dept: FAMILY MEDICINE CLINIC | Facility: CLINIC | Age: 60
End: 2025-01-27
Payer: COMMERCIAL

## 2025-01-27 PROCEDURE — 83036 HEMOGLOBIN GLYCOSYLATED A1C: CPT | Performed by: NURSE PRACTITIONER

## 2025-01-27 PROCEDURE — 80053 COMPREHEN METABOLIC PANEL: CPT | Performed by: NURSE PRACTITIONER

## 2025-01-27 PROCEDURE — 80061 LIPID PANEL: CPT | Performed by: NURSE PRACTITIONER

## 2025-01-27 PROCEDURE — 85027 COMPLETE CBC AUTOMATED: CPT | Performed by: NURSE PRACTITIONER

## 2025-01-28 LAB
ALBUMIN SERPL-MCNC: 3.9 G/DL (ref 3.5–5.2)
ALBUMIN/GLOB SERPL: 1.1 G/DL
ALP SERPL-CCNC: 105 U/L (ref 39–117)
ALT SERPL W P-5'-P-CCNC: 11 U/L (ref 1–33)
ANION GAP SERPL CALCULATED.3IONS-SCNC: 11.9 MMOL/L (ref 5–15)
AST SERPL-CCNC: 14 U/L (ref 1–32)
BILIRUB SERPL-MCNC: 0.3 MG/DL (ref 0–1.2)
BUN SERPL-MCNC: 17 MG/DL (ref 6–20)
BUN/CREAT SERPL: 19.3 (ref 7–25)
CALCIUM SPEC-SCNC: 9.2 MG/DL (ref 8.6–10.5)
CHLORIDE SERPL-SCNC: 103 MMOL/L (ref 98–107)
CHOLEST SERPL-MCNC: 246 MG/DL (ref 0–200)
CO2 SERPL-SCNC: 25.1 MMOL/L (ref 22–29)
CREAT SERPL-MCNC: 0.88 MG/DL (ref 0.57–1)
DEPRECATED RDW RBC AUTO: 40.8 FL (ref 37–54)
EGFRCR SERPLBLD CKD-EPI 2021: 76.3 ML/MIN/1.73
ERYTHROCYTE [DISTWIDTH] IN BLOOD BY AUTOMATED COUNT: 14.7 % (ref 12.3–15.4)
GLOBULIN UR ELPH-MCNC: 3.6 GM/DL
GLUCOSE SERPL-MCNC: 89 MG/DL (ref 65–99)
HBA1C MFR BLD: 6.1 % (ref 4.8–5.6)
HCT VFR BLD AUTO: 39.2 % (ref 34–46.6)
HDLC SERPL-MCNC: 54 MG/DL (ref 40–60)
HGB BLD-MCNC: 12.8 G/DL (ref 12–15.9)
LDLC SERPL CALC-MCNC: 145 MG/DL (ref 0–100)
LDLC/HDLC SERPL: 2.6 {RATIO}
MCH RBC QN AUTO: 25.6 PG (ref 26.6–33)
MCHC RBC AUTO-ENTMCNC: 32.7 G/DL (ref 31.5–35.7)
MCV RBC AUTO: 78.4 FL (ref 79–97)
PLATELET # BLD AUTO: 260 10*3/MM3 (ref 140–450)
PMV BLD AUTO: 10.7 FL (ref 6–12)
POTASSIUM SERPL-SCNC: 4.5 MMOL/L (ref 3.5–5.2)
PROT SERPL-MCNC: 7.5 G/DL (ref 6–8.5)
RBC # BLD AUTO: 5 10*6/MM3 (ref 3.77–5.28)
SODIUM SERPL-SCNC: 140 MMOL/L (ref 136–145)
TRIGL SERPL-MCNC: 257 MG/DL (ref 0–150)
VLDLC SERPL-MCNC: 47 MG/DL (ref 5–40)
WBC NRBC COR # BLD AUTO: 7.86 10*3/MM3 (ref 3.4–10.8)

## 2025-02-03 ENCOUNTER — OFFICE VISIT (OUTPATIENT)
Dept: FAMILY MEDICINE CLINIC | Facility: CLINIC | Age: 60
End: 2025-02-03
Payer: COMMERCIAL

## 2025-02-03 VITALS
HEART RATE: 75 BPM | WEIGHT: 237 LBS | HEIGHT: 64 IN | BODY MASS INDEX: 40.46 KG/M2 | DIASTOLIC BLOOD PRESSURE: 86 MMHG | OXYGEN SATURATION: 97 % | SYSTOLIC BLOOD PRESSURE: 134 MMHG

## 2025-02-03 DIAGNOSIS — Z12.31 BREAST CANCER SCREENING BY MAMMOGRAM: ICD-10-CM

## 2025-02-03 DIAGNOSIS — I10 PRIMARY HYPERTENSION: ICD-10-CM

## 2025-02-03 DIAGNOSIS — E78.2 MULTIPLE-TYPE HYPERLIPIDEMIA: ICD-10-CM

## 2025-02-03 DIAGNOSIS — F41.9 ANXIETY: ICD-10-CM

## 2025-02-03 DIAGNOSIS — M79.671 PAIN OF RIGHT HEEL: Primary | ICD-10-CM

## 2025-02-03 DIAGNOSIS — E11.9 TYPE 2 DIABETES MELLITUS WITHOUT COMPLICATION, WITHOUT LONG-TERM CURRENT USE OF INSULIN: ICD-10-CM

## 2025-02-03 PROCEDURE — 99214 OFFICE O/P EST MOD 30 MIN: CPT | Performed by: NURSE PRACTITIONER

## 2025-02-03 RX ORDER — ATORVASTATIN CALCIUM 20 MG/1
20 TABLET, FILM COATED ORAL DAILY
Qty: 90 TABLET | Refills: 1 | Status: SHIPPED | OUTPATIENT
Start: 2025-02-03

## 2025-02-03 RX ORDER — LISINOPRIL 20 MG/1
20 TABLET ORAL DAILY
Qty: 90 TABLET | Refills: 1 | Status: SHIPPED | OUTPATIENT
Start: 2025-02-03

## 2025-02-03 RX ORDER — PAROXETINE 10 MG/1
10 TABLET, FILM COATED ORAL DAILY
Qty: 90 TABLET | Refills: 1 | Status: SHIPPED | OUTPATIENT
Start: 2025-02-03

## 2025-02-03 NOTE — PROGRESS NOTES
"Chief Complaint  Chief Complaint   Patient presents with    Follow-up     Labs drawn on 1/27/25  6 month follow up  Pt states she is doing well           Subjective          Gena Harper presents to [facility] for     Routine exam  Pertinent negative symptoms include no abdominal pain, no anorexia, no joint pain, no change in stool, no chest pain, no chills, no congestion, no cough, no diaphoresis, no fatigue, no fever, no headaches, no joint swelling, no myalgias, no nausea, no neck pain, no numbness, no rash, no sore throat, no swollen glands, no dysuria, no vertigo, no visual change, no vomiting and no weakness.     Patient complains of posterior heel pain, primarily with walking, symptoms have been present approximately 4 months.  She denies any injury    Right lower lobe 1.8 cm nodule was incidentally noted on a CT 2/2024 along with uterine fibroid, CT of the chest followed up 6/20/24 with no change in size of nodule.      HTN, stable on meds and takes as directed, denies chest pain, headache, shortness of air, palpitations and swelling of extremities.      Diabetes mellitus type II, feels stable on Rybelsus, she does report occasional nausea but overall tolerating medication well.  Denies any signs/symptoms of hyper/hypoglycemia, blurry vision, polydipsia, polyuria, nocturia, and unintentional weight loss      Anxiety, patient is stable on Paxil.  She is stressed with work, has a lot going on, reports brain fog, \"feels like I have medication brain\".      Hyperlipidemia, takes fish oil 2/day, garlique, flaxseed and statin. The patient denies muscle aches, constipation, diarrhea, GI upset, fatigue, chest pain/pressure, exercise intolerance, dyspnea, palpitations, syncope and pedal edema.       Here to review labs    The following portions of the patient's history were reviewed and updated as appropriate: allergies, current medications, past family history, past medical history, " past social history, past surgical history and problem list.    Past Medical History:   Diagnosis Date    Breast mass 2018    Cataract     Delivery of      Diabetes mellitus     Fibrocystic breast     Hypertension      Past Surgical History:   Procedure Laterality Date    BREAST BIOPSY Right 2018    core    CATARACT EXTRACTION Right     MOUTH SURGERY      Top 2 molar/oral surgery    PERINEAL/ANAL CONDYLOMA EXCISION/FULGURATION       Family History   Problem Relation Age of Onset    Heart disease Father     Kidney disease Father     Diabetes Maternal Grandmother     Breast cancer Maternal Grandmother     Breast cancer Sister 44         at 48    Cancer Sister         Breast cancer -  2012     Social History     Tobacco Use    Smoking status: Never     Passive exposure: Never    Smokeless tobacco: Never   Substance Use Topics    Alcohol use: Yes     Alcohol/week: 4.0 standard drinks of alcohol     Types: 2 Glasses of wine, 2 Shots of liquor per week     Comment: occ       Current Outpatient Medications:     ascorbic acid (VITAMIN C) 1000 MG tablet, , Disp: , Rfl:     atorvastatin (LIPITOR) 20 MG tablet, Take 1 tablet by mouth Daily., Disp: 90 tablet, Rfl: 1    Calcium Carbonate (CALTRATE 600 PO), Take 1 tablet by mouth Daily., Disp: , Rfl:     ferrous sulfate 325 (65 FE) MG tablet, Take 1 tablet by mouth Daily With Breakfast., Disp: , Rfl:     Flaxseed, Linseed, (Flaxseed Oil) 1400 MG capsule, , Disp: , Rfl:     Garlic 100 MG tablet, Take 100 mg by mouth Daily., Disp: , Rfl:     glucosamine-chondroitin 500-400 MG capsule capsule, Take  by mouth 3 (Three) Times a Day With Meals., Disp: , Rfl:     ibuprofen (ADVIL,MOTRIN) 200 MG tablet, Take 1 tablet by mouth Every 6 (Six) Hours As Needed for Mild Pain., Disp: , Rfl:     lisinopril (PRINIVIL,ZESTRIL) 20 MG tablet, Take 1 tablet by mouth Daily., Disp: 90 tablet, Rfl: 1    loratadine (Claritin) 10 MG tablet, Take 1 tablet by mouth Daily.,  "Disp: , Rfl:     Multiple Vitamins-Minerals (ZINC PO), Take  by mouth Daily., Disp: , Rfl:     Omega-3 Fatty Acids (fish oil) 1000 MG capsule capsule, Take 1 capsule by mouth 2 (Two) Times a Day With Meals., Disp: , Rfl:     PARoxetine (PAXIL) 10 MG tablet, Take 1 tablet by mouth Daily., Disp: 90 tablet, Rfl: 1    Psyllium (METAMUCIL 4 IN 1 FIBER PO), As Needed (constipation)., Disp: , Rfl:     Rhubarb (ESTROVEN COMPLETE PO), Take  by mouth Daily., Disp: , Rfl:     Rybelsus 7 MG tablet, TAKE 1 TABLET BY MOUTH EVERY MORNING BEFORE BREAKFAST, Disp: 90 tablet, Rfl: 1    vitamin B-12 (CYANOCOBALAMIN) 1000 MCG tablet, Take 1 tablet by mouth Daily., Disp: , Rfl:     Vitamin D, Cholecalciferol, (CHOLECALCIFEROL) 10 MCG (400 UNIT) tablet, Take 1 tablet by mouth Daily., Disp: , Rfl:     Objective   Vital Signs:   Vitals:    02/03/25 0920   BP: 134/86   BP Location: Left arm   Patient Position: Sitting   Cuff Size: Adult   Pulse: 75   SpO2: 97%   Weight: 108 kg (237 lb)   Height: 162.6 cm (64\")   PainSc: 0-No pain       Body mass index is 40.68 kg/m².    Physical Exam  Constitutional:       General: She is not in acute distress.     Appearance: Normal appearance. She is well-developed. She is not ill-appearing or diaphoretic.   HENT:      Head: Normocephalic.   Eyes:      Conjunctiva/sclera: Conjunctivae normal.      Pupils: Pupils are equal, round, and reactive to light.   Neck:      Thyroid: No thyromegaly.      Vascular: No JVD.   Cardiovascular:      Rate and Rhythm: Normal rate and regular rhythm.      Heart sounds: Normal heart sounds. No murmur heard.  Pulmonary:      Effort: Pulmonary effort is normal. No respiratory distress.      Breath sounds: Normal breath sounds. No wheezing or rhonchi.   Abdominal:      General: Bowel sounds are normal. There is no distension.      Palpations: Abdomen is soft.      Tenderness: There is no abdominal tenderness.   Musculoskeletal:         General: Tenderness (right posterior " calcaneal ttp, distal achilles heel region) present. No swelling. Normal range of motion.      Cervical back: Normal range of motion and neck supple. No tenderness.   Lymphadenopathy:      Cervical: No cervical adenopathy.   Skin:     General: Skin is warm and dry.      Coloration: Skin is not jaundiced.      Findings: No erythema or rash.   Neurological:      General: No focal deficit present.      Mental Status: She is alert and oriented to person, place, and time. Mental status is at baseline.      Sensory: No sensory deficit.      Motor: No weakness.      Gait: Gait abnormal.   Psychiatric:         Mood and Affect: Mood normal.         Behavior: Behavior normal.         Thought Content: Thought content normal.         Judgment: Judgment normal.          Result Review :     No visits with results within 7 Day(s) from this visit.   Latest known visit with results is:   Orders Only on 01/15/2025   Component Date Value Ref Range Status    WBC 01/27/2025 7.86  3.40 - 10.80 10*3/mm3 Final    RBC 01/27/2025 5.00  3.77 - 5.28 10*6/mm3 Final    Hemoglobin 01/27/2025 12.8  12.0 - 15.9 g/dL Final    Hematocrit 01/27/2025 39.2  34.0 - 46.6 % Final    MCV 01/27/2025 78.4 (L)  79.0 - 97.0 fL Final    MCH 01/27/2025 25.6 (L)  26.6 - 33.0 pg Final    MCHC 01/27/2025 32.7  31.5 - 35.7 g/dL Final    RDW 01/27/2025 14.7  12.3 - 15.4 % Final    RDW-SD 01/27/2025 40.8  37.0 - 54.0 fl Final    MPV 01/27/2025 10.7  6.0 - 12.0 fL Final    Platelets 01/27/2025 260  140 - 450 10*3/mm3 Final    Glucose 01/27/2025 89  65 - 99 mg/dL Final    BUN 01/27/2025 17  6 - 20 mg/dL Final    Creatinine 01/27/2025 0.88  0.57 - 1.00 mg/dL Final    Sodium 01/27/2025 140  136 - 145 mmol/L Final    Potassium 01/27/2025 4.5  3.5 - 5.2 mmol/L Final    Chloride 01/27/2025 103  98 - 107 mmol/L Final    CO2 01/27/2025 25.1  22.0 - 29.0 mmol/L Final    Calcium 01/27/2025 9.2  8.6 - 10.5 mg/dL Final    Total Protein 01/27/2025 7.5  6.0 - 8.5 g/dL Final     Albumin 01/27/2025 3.9  3.5 - 5.2 g/dL Final    ALT (SGPT) 01/27/2025 11  1 - 33 U/L Final    AST (SGOT) 01/27/2025 14  1 - 32 U/L Final    Alkaline Phosphatase 01/27/2025 105  39 - 117 U/L Final    Total Bilirubin 01/27/2025 0.3  0.0 - 1.2 mg/dL Final    Globulin 01/27/2025 3.6  gm/dL Final    A/G Ratio 01/27/2025 1.1  g/dL Final    BUN/Creatinine Ratio 01/27/2025 19.3  7.0 - 25.0 Final    Anion Gap 01/27/2025 11.9  5.0 - 15.0 mmol/L Final    eGFR 01/27/2025 76.3  >60.0 mL/min/1.73 Final    Total Cholesterol 01/27/2025 246 (H)  0 - 200 mg/dL Final    Triglycerides 01/27/2025 257 (H)  0 - 150 mg/dL Final    HDL Cholesterol 01/27/2025 54  40 - 60 mg/dL Final    LDL Cholesterol  01/27/2025 145 (H)  0 - 100 mg/dL Final    VLDL Cholesterol 01/27/2025 47 (H)  5 - 40 mg/dL Final    LDL/HDL Ratio 01/27/2025 2.60   Final    Hemoglobin A1C 01/27/2025 6.10 (H)  4.80 - 5.60 % Final                              Assessment and Plan    Diagnoses and all orders for this visit:    1. Pain of right heel (Primary)  Comments:  X-ray, consider referral to podiatry  Orders:  -     XR Calcaneus 2+ View Right (In Office); Future    2. Multiple-type hyperlipidemia  Comments:  Lipids worse w/ good HDL, elevated trigs  cont atorvastatin, flax, fish oil 2 g/day  rec strict HH and diabetic diet, increase exercise habits  Orders:  -     atorvastatin (LIPITOR) 20 MG tablet; Take 1 tablet by mouth Daily.  Dispense: 90 tablet; Refill: 1    3. Primary hypertension  Comments:  BP stable, Continue and refill lisinopril  Orders:  -     lisinopril (PRINIVIL,ZESTRIL) 20 MG tablet; Take 1 tablet by mouth Daily.  Dispense: 90 tablet; Refill: 1    4. Anxiety  Comments:  Stable, continue and refill Paxil   Orders:  -     PARoxetine (PAXIL) 10 MG tablet; Take 1 tablet by mouth Daily.  Dispense: 90 tablet; Refill: 1    5. Breast cancer screening by mammogram  -     Mammo Screening Digital Tomosynthesis Bilateral With CAD; Future    6. Type 2 diabetes mellitus  "without complication, without long-term current use of insulin  Comments:  A1c stable at 6.1  Continue Rybelsus        Consider fenofibrate, cont fish oil for now, recheck 6mo    Try switching paxil to night d/t daytime fatigue \"medication brain\"      I spent 30 minutes caring for Gena Harper on this date of service. This time includes time spent by me in the following activities: preparing for the visit, reviewing tests, performing a medically appropriate examination and/or evaluation , counseling and educating the patient/family/caregiver, ordering medications, tests, or procedures and documenting information in the medical record        Follow Up     Return in about 6 months (around 8/3/2025) for Recheck, Annual physical. DM panel, TSH, vitamin D/B12 prior to appt .  Patient was given instructions and counseling regarding her condition or for health maintenance advice. Please see specific information pulled into the AVS if appropriate.        Part of this note may be an electronic transcription/translation of spoken language to printed text using the Dragon Dictation System  "

## 2025-02-05 DIAGNOSIS — M77.31 CALCANEAL SPUR OF FOOT, RIGHT: Primary | ICD-10-CM

## 2025-02-13 ENCOUNTER — OFFICE VISIT (OUTPATIENT)
Age: 60
End: 2025-02-13
Payer: COMMERCIAL

## 2025-02-13 VITALS
RESPIRATION RATE: 18 BRPM | HEIGHT: 64 IN | BODY MASS INDEX: 40.46 KG/M2 | OXYGEN SATURATION: 97 % | HEART RATE: 78 BPM | WEIGHT: 237 LBS

## 2025-02-13 DIAGNOSIS — M77.51 RETROCALCANEAL BURSITIS, RIGHT: ICD-10-CM

## 2025-02-13 DIAGNOSIS — E66.01 MORBID OBESITY WITH BMI OF 40.0-44.9, ADULT: ICD-10-CM

## 2025-02-13 DIAGNOSIS — M21.861 ACQUIRED POSTERIOR EQUINUS OF RIGHT LOWER EXTREMITY: ICD-10-CM

## 2025-02-13 DIAGNOSIS — M79.671 RIGHT FOOT PAIN: Primary | ICD-10-CM

## 2025-02-13 DIAGNOSIS — M76.61 ACHILLES TENDINITIS OF RIGHT LOWER EXTREMITY: ICD-10-CM

## 2025-02-13 DIAGNOSIS — M77.31 CALCANEAL SPUR, RIGHT: ICD-10-CM

## 2025-02-13 RX ORDER — TRIAMCINOLONE ACETONIDE 40 MG/ML
20 INJECTION, SUSPENSION INTRA-ARTICULAR; INTRAMUSCULAR ONCE
Status: COMPLETED | OUTPATIENT
Start: 2025-02-13 | End: 2025-02-13

## 2025-02-13 RX ADMIN — TRIAMCINOLONE ACETONIDE 20 MG: 40 INJECTION, SUSPENSION INTRA-ARTICULAR; INTRAMUSCULAR at 13:47

## 2025-02-14 NOTE — PROGRESS NOTES
2025  Foot and Ankle Surgery - New Patient   Provider: Dr. Jeremiah Jasso DPM  Location: [facility]    Subjective:  Gena Harper is a 59 y.o. female.     Chief Complaint   Patient presents with    Right Foot - Pain, Initial Evaluation    Initial Evaluation     PCP: Day Cardoso APRN  Last PCP Visit:  2/3/2025       History of Present Illness  The patient presents for evaluation of right foot pain.    She reports experiencing pain in the posterior aspect of her right foot, a condition that has persisted for approximately 4 to 5 months. She has not sought medical attention for this issue previously and has no history of similar problems with the same foot. 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 She also reports occasional discomfort in the joint of her big toe. She does not engage in any physical activities outside of work but expresses a desire to increase her walking activity once the weather improves. She has observed that walking on uneven surfaces exacerbates her ankle pain. She typically wears tennis shoes for footwear. She is open to the possibility of physical therapy and has recently purchased [employer] shoes.    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       No Known Allergies    Past Medical History:   Diagnosis Date    Breast mass 2018    Biopsy benign    Cataract     Implants on 2/15/17 & 17    Delivery of      Abstraction from [facility] Child[facility] x 3    Diabetes mellitus     Fibrocystic breast     Hypertension        Past Surgical History:   Procedure Laterality Date    BREAST BIOPSY Right 2018    core    CATARACT EXTRACTION Right     MOUTH SURGERY      Top 2 molar/oral surgery    PERINEAL/ANAL CONDYLOMA EXCISION/FULGURATION         Family History   Problem Relation Age of Onset    Heart disease Father     Kidney disease Father     Diabetes Maternal Grandmother     Breast cancer Maternal  Grandmother     Breast cancer Sister 44         at 48    Cancer Sister         Breast cancer -  2012       Social History     Socioeconomic History    Marital status:    Tobacco Use    Smoking status: Never     Passive exposure: Never    Smokeless tobacco: Never   Vaping Use    Vaping status: Never Used   Substance and Sexual Activity    Alcohol use: Yes     Alcohol/week: 4.0 standard drinks of alcohol     Types: 2 Glasses of wine, 2 Shots of liquor per week     Comment: occ    Drug use: Never    Sexual activity: Yes     Partners: Male     Birth control/protection: None        Current Outpatient Medications on File Prior to Visit   Medication Sig Dispense Refill    ascorbic acid (VITAMIN C) 1000 MG tablet       atorvastatin (LIPITOR) 20 MG tablet Take 1 tablet by mouth Daily. 90 tablet 1    Calcium Carbonate (CALTRATE 600 PO) Take 1 tablet by mouth Daily.      ferrous sulfate 325 (65 FE) MG tablet Take 1 tablet by mouth Daily With Breakfast.      Flaxseed, Linseed, (Flaxseed Oil) 1400 MG capsule       Garlic 100 MG tablet Take 100 mg by mouth Daily.      glucosamine-chondroitin 500-400 MG capsule capsule Take  by mouth 3 (Three) Times a Day With Meals.      ibuprofen (ADVIL,MOTRIN) 200 MG tablet Take 1 tablet by mouth Every 6 (Six) Hours As Needed for Mild Pain.      lisinopril (PRINIVIL,ZESTRIL) 20 MG tablet Take 1 tablet by mouth Daily. 90 tablet 1    loratadine (Claritin) 10 MG tablet Take 1 tablet by mouth Daily.      Multiple Vitamins-Minerals (ZINC PO) Take  by mouth Daily.      Omega-3 Fatty Acids (fish oil) 1000 MG capsule capsule Take 1 capsule by mouth 2 (Two) Times a Day With Meals.      PARoxetine (PAXIL) 10 MG tablet Take 1 tablet by mouth Daily. 90 tablet 1    Psyllium (METAMUCIL 4 IN 1 FIBER PO) As Needed (constipation).      Rhubarb (ESTROVEN COMPLETE PO) Take  by mouth Daily.      Rybelsus 7 MG tablet TAKE 1 TABLET BY MOUTH EVERY MORNING BEFORE BREAKFAST 90 tablet 1     "vitamin B-12 (CYANOCOBALAMIN) 1000 MCG tablet Take 1 tablet by mouth Daily.      Vitamin D, Cholecalciferol, (CHOLECALCIFEROL) 10 MCG (400 UNIT) tablet Take 1 tablet by mouth Daily.       No current facility-administered medications on file prior to visit.         Objective   Pulse 78   Resp 18   Ht 162.6 cm (64\")   Wt 108 kg (237 lb)   SpO2 97%   BMI 40.68 kg/m²     Foot/Ankle Exam    GENERAL  Appearance:  appears stated age and obese  Orientation:  AAOx3  Affect:  appropriate    VASCULAR     Right Foot Vascularity   Normal vascular exam    Dorsalis pedis:  2+  Posterior tibial:  2+  Skin temperature:  warm  Edema grading:  None  CFT:  < 3 seconds  Pedal hair growth:  Present  Varicosities:  none     NEUROLOGIC     Right Foot Neurologic   Light touch sensation: normal  Hot/Cold sensation: normal  Achilles reflex:  2+    MUSCULOSKELETAL     Right Foot Musculoskeletal   Ecchymosis:  none  Tenderness:  achilles tendon tenderness and retrocalcaneal bursa tenderness    Arch:  Normal    MUSCLE STRENGTH     Right Foot Muscle Strength   Normal strength    Foot dorsiflexion:  5  Foot plantar flexion:  5  Foot inversion:  5  Foot eversion:  5    DERMATOLOGIC      Right Foot Dermatologic   Skin  Right foot skin is intact.     TESTS     Right Foot Tests   Anterior drawer: negative  Varus tilt: negative     Right foot additional comments: Significant equinus contracture with knee extended and flexed.  Prominent discomfort with palpation involving the posterior aspect of the heel.  No obvious deformity or instability involving the lower extremity.    Physical Exam         Results  Imaging  X-ray of the right foot shows a little bit of arthritis on the big toe joint and a spur within the Achilles tendon.       Assessment & Plan   Diagnoses and all orders for this visit:    1. Right foot pain (Primary)    2. Achilles tendinitis of right lower extremity  -     triamcinolone acetonide (KENALOG-40) injection 20 mg  -     " Ambulatory Referral to Physical Therapy for Evaluation & Treatment    3. Calcaneal spur, right    4. Acquired posterior equinus of right lower extremity    5. Retrocalcaneal bursitis, right    6. Morbid obesity with BMI of 40.0-44.9, adult       Assessment & Plan  The patient's symptoms are consistent with Achilles tendinitis, characterized by inflammation and tightness in the Achilles tendon. The x-ray shows a spur within the tendon, indicative of calcified scar tissue. The tendon remains intact, ruling out a rupture. A comprehensive discussion was held regarding the pathophysiology of Achilles tendinitis, including the formation of spurs due to calcified scar tissue. She was advised to engage in low-impact exercises such as biking, elliptical, swimming, water aerobics, and yoga. The use of clogs or open back heeled shoes was recommended to alleviate stress on the affected area. She was also advised to perform stretching exercises 3 to 5 times daily. The use of Powerstep inserts was suggested for additional support. She was cautioned against walking on uneven terrain and advised to avoid barefoot, flip flops, sandals, and flats. A steroid injection will be administered today to help reduce inflammation. If symptoms persist, surgical intervention may be considered.Reviewed proper basic stretching and manual therapy exercises along with appropriate shoes and activity.  Discussed proper use and/or avoidance of OTC anti-inflammatories.  Patient is to call with any additional issues or concerns.  Greater than 30 minutes was spent before, during, and after evaluation for patient care excluding procedure.     Retrocalcaneal/ Achilles Steroid Injection: Right    Consent and time out was performed before proceeding with injection.  The skin about the lateral Kagar's triangle region of the right foot was cleansed with alcohol.  Ultrasound guidance was used to evaluate then Achilles tendon and injection guidance. Using an  aseptic technique, a 1.5 mL solution containing 0.5 mL of 0.5% Marcaine plain, 0.5mL of 1% lidocaine plain and 0.5 mL of Kenalog was injected to the insertion site of the Achilles tendon. After the injection, compression was applied followed by a sterile bandage.  The patient noted relief from pain and tolerated the injection well without complication.    Follow-up  The patient will follow up in 6 weeks.             Patient or patient representative verbalized consent for the use of Ambient Listening during the visit with  GENESIS Jasso DPM for chart documentation. 2/14/2025  07:08 EST    GENESIS Jasso DPM

## 2025-02-19 LAB
NCCN CRITERIA FLAG: NORMAL
TYRER CUZICK SCORE: 12.1

## 2025-02-24 ENCOUNTER — HOSPITAL ENCOUNTER (OUTPATIENT)
Dept: MAMMOGRAPHY | Facility: HOSPITAL | Age: 60
Discharge: HOME | End: 2025-02-24
Admitting: NURSE PRACTITIONER
Payer: COMMERCIAL

## 2025-02-24 DIAGNOSIS — Z12.31 BREAST CANCER SCREENING BY MAMMOGRAM: ICD-10-CM

## 2025-02-24 PROCEDURE — 77063 BREAST TOMOSYNTHESIS BI: CPT

## 2025-02-24 PROCEDURE — 77067 SCR MAMMO BI INCL CAD: CPT

## 2025-02-25 ENCOUNTER — TREATMENT (OUTPATIENT)
Dept: PHYSICAL THERAPY | Facility: CLINIC | Age: 60
End: 2025-02-25
Payer: COMMERCIAL

## 2025-02-25 DIAGNOSIS — M76.61 ACHILLES TENDINITIS OF RIGHT LOWER EXTREMITY: Primary | ICD-10-CM

## 2025-02-25 NOTE — PROGRESS NOTES
Physical Therapy Initial Evaluation and Plan of Care      Patient: Gena Harper   : 1965  Diagnosis/ICD-10 Code:  Achilles tendinitis of right lower extremity [M76.61]  Referring practitioner: GENESIS Jasso DPM  Date of Initial Visit: 2025  Gena Harper was seen by Emanuel Nguyen PT at [facility] PHYSICAL THERAPY  [address] [facility] [address]  [place] [address]  Fax [phone]  Phone [phone]   Today's Date: 2025  Patient seen for 1 sessions           Subjective Questionnaire: FAAM: 94% function (skewed due to recent injection)      Subjective Evaluation    History of Present Illness  Onset date: 4-5 months ago.  Mechanism of injury: 60 y/o female with insidious onset of R ankle pain beginning 4-5 months ago. Seen by Dr. Jasso and diagnosed with achilles tendonitis, bone spur and retrocalcaneal bursitis per patient and MD note. Had injection in ankle - helped a lot. Pain bottom of heel and up achilles with walking on odd position of foot or with movement.     Currently (after injection) less intense pain and in achilles. MD wants low-impact activities in future: cycling; pool; etc.. Patient has above ground pool for summer use.    PMH and pertinent information reviewed in Epic.         Patient Occupation: controller: comuter/desk job. Pain  Current pain ratin  At best pain ratin  At worst pain ratin  Quality: discomfort, dull ache and sharp  Alleviating factors: injection.  Aggravating factors: ambulation and stairs  Progression: improved    Social Support  Lives in: one-story house (steps to enter: 3 w/o rail)  Lives with: spouse    Diagnostic Tests  X-ray: abnormal (see Epic for details)    Treatments  Current treatment: injection treatment  Patient Goals  Patient goals for therapy: independence with ADLs/IADLs and decreased pain  Patient goal: Return to PLOF           Objective          Observations     Additional Ankle/Foot Observation  Details  Inserts in shoes (powerstep)    Tenderness     Right Ankle/Foot   Tenderness in the Achilles insertion.     Additional Tenderness Details  Retrocalcaneal bursa    Neurological Testing     Sensation     Ankle/Foot   Left Ankle/Foot   Intact: light touch    Right Ankle/Foot   Intact: light touch     Active Range of Motion   Left Ankle/Foot   Normal active range of motion    Right Ankle/Foot   Normal active range of motion    Strength/Myotome Testing     Left Ankle/Foot   Normal strength    Right Ankle/Foot   Normal strength    Additional Strength Details  Intrinsic foot weakness with collapse of arch B    Tests     Additional Tests Details  Too many toes sign; pes planus B      Access Code: [email or web address]  URL: [email or web address]  Date: 2025  Prepared by: Emanuel Nguyen    Exercises  - Seated Arch Lifts  - 1 x daily - 7 x weekly - 3 sets - 10 reps  - Towel Scrunches  - 1 x daily - 7 x weekly - 2 sets - 10 reps  - Seated Eccentric Ankle Plantar Flexion with Resistance  - 1 x daily - 7 x weekly - 3 sets - 10 reps    Assessment & Plan       Assessment  Impairments: abnormal gait, abnormal or restricted ROM, activity intolerance, impaired balance, impaired physical strength, lacks appropriate home exercise program and pain with function   Functional limitations: walking, uncomfortable because of pain and standing   Assessment details: The patient is a 59 y.o. female who presents to physical therapy today for pain . Upon initial evaluation, the patient demonstrates the following impairments: R achilles pain during walking, stairs and standing activities. Examination findings indicate pes planus; pain with palpation; intrinsic weakness; poor tolerance to weight-bearing activities.     The patient would benefit from skilled PT services to address functional limitations and impairments and to improve patient quality of life.    Prognosis: good    Goals  Plan Goals: ST. Pt will be independent and  compliant with initial HEP in 2 weeks.  2. Pt will report a 15% improvement in symptoms since starting therapy in 4 weeks.   4. Pt will report pain level at worst <3 during walking/standing activity in 2 weeks.    LT. Pt will be independent with final HEP for self-management of condition by DC.  2. Pt will improve score on FAAM to greater than 80/84 by DC.   3. Pt will report a 75% improvement in symptoms by DC in order to allow return to PLOF.  4. Pt will improve single leg stance balance to 30 seconds on R LE to indicate improved ankle stability and be able to walk on uneven surfaces by DC.   5. Pt will improve intrinsic ankle strength to tolerate 20 repeated step ups and downs in order to improve ankle mechanics when negotiating stairs and when ambulating.       Plan  Therapy options: will be seen for skilled therapy services  Planned modality interventions: cryotherapy, electrical stimulation/Russian stimulation, thermotherapy (hydrocollator packs), ultrasound, TENS and iontophoresis  Planned therapy interventions: abdominal trunk stabilization, ADL retraining, balance/weight-bearing training, body mechanics training, flexibility, functional ROM exercises, gait training, home exercise program, joint mobilization, manual therapy, motor coordination training, neuromuscular re-education, orthotic fitting/training, postural training, soft tissue mobilization, spinal/joint mobilization, strengthening, stretching and therapeutic activities  Frequency: 2x week  Duration in visits: 12  Duration in weeks: 12  Treatment plan discussed with: patient  Plan details: 1-2 x weekly as appropriate        History # of Personal Factors and/or Comorbidities: LOW (0)  Examination of Body System(s): # of elements: LOW (1-2)  Clinical Presentation: STABLE   Clinical Decision Making: LOW       Timed:         Manual Therapy:         mins  85384;     Therapeutic Exercise:    15     mins  30672;     Neuromuscular Mir:        mins   77028;    Therapeutic Activity:          mins  26991;     Gait Training:           mins  16783;     Ultrasound:          mins  31470;    Ionto                                   mins   29990  Self Care                       10     mins   21676  Aquatic                               mins 59589      Un-Timed:  Canalith Repositioning __ mins 98910  Electrical Stimulation:         mins  61519 ( );  Dry Needling          mins self-pay  Traction          mins 48934  Low Eval    30      Mins  27226  Mod Eval          Mins  69778  High Eval                            Mins  30575  Re-Eval                               mins  33260        Timed Treatment:   25   mins   Total Treatment:      55  mins    PT SIGNATURE: Emanuel Nguyen PT DPT  [place] License#: [id number]       Electronically signed by Emanuel Nguyen PT, 02/25/25, 11:31 AM EST      Initial Certification  Certification Period:  2/25/2025 thru 5/25/2025  I certify that the therapy services are furnished while this patient is under my care.  The services outlined above are required by this patient, and will be reviewed every 90 days.       Physician Signature:__________________________________________________    PHYSICIAN: GENESIS Jasso DPM      DATE:     Please sign and return via fax to [phone].. Thank you, [facility].

## 2025-03-06 ENCOUNTER — TREATMENT (OUTPATIENT)
Dept: PHYSICAL THERAPY | Facility: CLINIC | Age: 60
End: 2025-03-06
Payer: COMMERCIAL

## 2025-03-06 DIAGNOSIS — M76.61 ACHILLES TENDINITIS OF RIGHT LOWER EXTREMITY: Primary | ICD-10-CM

## 2025-03-06 NOTE — PROGRESS NOTES
Physical Therapy Daily Note      Gena Harper was seen by Emanuel Nguyen, PT at [facility] PHYSICAL THERAPY  [address]  [place]   Fax [phone]  Phone [phone]       Diagnosis Plan   1. Achilles tendinitis of right lower extremity            VISIT#: 2  Referring practitioner: GENESIS Jasso DPM    Subjective   Gena Harper reports: doing well after exam; ankle still doing better after injection.      Objective     See Exercise, Manual, and Modality Logs for complete treatment.     Patient Education:  Reviewed HEP and progression of HEP on next visit: remodeling of tissue.    Assessment/Plan  - initiated standing eccentric exercise: minimal to no pain - will update on next visit if tolerable.  - ASTYM for remodeling soft tissue.  - good response to interventions.  - exercise to facilitate functional activities: walking; stairs; etc.    Goals  Plan Goals: ST. Pt will be independent and compliant with initial HEP in 2 weeks.  2. Pt will report a 15% improvement in symptoms since starting therapy in 4 weeks.   4. Pt will report pain level at worst <3 during walking/standing activity in 2 weeks.     LT. Pt will be independent with final HEP for self-management of condition by DC.  2. Pt will improve score on FAAM to greater than 80/84 by DC.   3. Pt will report a 75% improvement in symptoms by DC in order to allow return to PLOF.  4. Pt will improve single leg stance balance to 30 seconds on R LE to indicate improved ankle stability and be able to walk on uneven surfaces by DC.   5. Pt will improve intrinsic ankle strength to tolerate 20 repeated step ups and downs in order to improve ankle mechanics when negotiating stairs and when ambulating.     Progress strengthening /stabilization /functional activity            Timed:         Manual Therapy:  10       mins  20020;     Therapeutic Exercise:    10     mins  89451;     Neuromuscular Mir:         mins  16970;    Therapeutic Activity:     10     mins  51147;     Gait Training:          mins  31160;     Ultrasound:          mins  00452;    Ionto                                   mins   12353  Self Care                            mins   56006  Aquatic                               mins 74651    Un-Timed:  Canalith Repos                   mins  90671  Electrical Stimulation:         mins  38462 ( );  Dry Needling          mins self-pay  Traction          mins 54481  Low Eval         Mins  37022  Mod Eval          Mins  91183  High Eval                            Mins  11427  Re-Eval                               mins  23223    Timed Treatment:   30   mins   Total Treatment:    30    mins    Emanuel Nguyen PT PT, DPT, [place] License #: [id number]

## 2025-03-10 ENCOUNTER — TREATMENT (OUTPATIENT)
Dept: PHYSICAL THERAPY | Facility: CLINIC | Age: 60
End: 2025-03-10
Payer: COMMERCIAL

## 2025-03-10 DIAGNOSIS — M76.61 ACHILLES TENDINITIS OF RIGHT LOWER EXTREMITY: Primary | ICD-10-CM

## 2025-03-10 NOTE — PROGRESS NOTES
Physical Therapy Daily Note      Gena Harper was seen by Emanuel Nguyen, PT at [facility] PHYSICAL THERAPY  [address]  [place]   Fax [phone]  Phone [phone]       Diagnosis Plan   1. Achilles tendinitis of right lower extremity            VISIT#: 3  Referring practitioner: GENESIS Jasso DPM    Subjective   Gena Harper reports: no complications with previous treatment or HEP. Minimal to no pain.  Objective     See Exercise, Manual, and Modality Logs for complete treatment.     Patient Education:  Access Code: [email or web address]  URL: [email or web address]  Date: 03/10/2025  Prepared by: Emanuel Nguyen    Exercises  - Standing Eccentric Heel Raise  - 1 x daily - 7 x weekly - 3 sets - 10 reps    Assessment/Plan  - initiated standing eccentric exercise: minimal to no pain - updated as above.  - ASTYM for remodeling soft tissue.  - good response to interventions.  - exercise to facilitate functional activities: walking; stairs; etc.    Goals  Plan Goals: ST. Pt will be independent and compliant with initial HEP in 2 weeks.  2. Pt will report a 15% improvement in symptoms since starting therapy in 4 weeks.   4. Pt will report pain level at worst <3 during walking/standing activity in 2 weeks.     LT. Pt will be independent with final HEP for self-management of condition by DC.  2. Pt will improve score on FAAM to greater than 80/84 by DC.   3. Pt will report a 75% improvement in symptoms by DC in order to allow return to PLOF.  4. Pt will improve single leg stance balance to 30 seconds on R LE to indicate improved ankle stability and be able to walk on uneven surfaces by DC.   5. Pt will improve intrinsic ankle strength to tolerate 20 repeated step ups and downs in order to improve ankle mechanics when negotiating stairs and when ambulating.     Progress strengthening /stabilization /functional activity            Timed:         Manual  Therapy:  10       mins  57577;     Therapeutic Exercise:    12     mins  83324;     Neuromuscular Mir:        mins  73254;    Therapeutic Activity:     10     mins  78884;     Gait Training:          mins  49438;     Ultrasound:          mins  26676;    Ionto                                   mins   29382  Self Care                            mins   84655  Aquatic                               mins 36158    Un-Timed:  Canalith Repos                   mins  83275  Electrical Stimulation:         mins  59789 ( );  Dry Needling          mins self-pay  Traction          mins 48158  Low Eval         Mins  04419  Mod Eval          Mins  57452  High Eval                            Mins  61185  Re-Eval                               mins  75554    Timed Treatment:   32   mins   Total Treatment:    32    mins    Emanuel Nguyen PT PT, DPT, [place] License #: [id number]

## 2025-03-12 ENCOUNTER — TREATMENT (OUTPATIENT)
Dept: PHYSICAL THERAPY | Facility: CLINIC | Age: 60
End: 2025-03-12
Payer: COMMERCIAL

## 2025-03-12 DIAGNOSIS — M76.61 ACHILLES TENDINITIS OF RIGHT LOWER EXTREMITY: Primary | ICD-10-CM

## 2025-03-12 NOTE — PROGRESS NOTES
Physical Therapy Daily Note      Gena Harper was seen by Emanuel Nguyen, PT at [facility] PHYSICAL THERAPY  [address]  [place]   Fax [phone]  Phone [phone]       Diagnosis Plan   1. Achilles tendinitis of right lower extremity            VISIT#: 4  Referring practitioner: GENESIS Jasso DPM    Subjective   Gena Harper reports: as on last visit: min to no pain.    .  Objective     See Exercise, Manual, and Modality Logs for complete treatment.     Patient Education:      Assessment/Plan  - continued standing eccentric exercise: minimal to no pain.  - ASTYM for remodeling soft tissue.  - started therapy early.  - good response to interventions.  - exercise to facilitate functional activities: walking; stairs; etc.    Goals  Plan Goals: ST. Pt will be independent and compliant with initial HEP in 2 weeks.  2. Pt will report a 15% improvement in symptoms since starting therapy in 4 weeks.   4. Pt will report pain level at worst <3 during walking/standing activity in 2 weeks.     LT. Pt will be independent with final HEP for self-management of condition by DC.  2. Pt will improve score on FAAM to greater than 80/84 by DC.   3. Pt will report a 75% improvement in symptoms by DC in order to allow return to PLOF.  4. Pt will improve single leg stance balance to 30 seconds on R LE to indicate improved ankle stability and be able to walk on uneven surfaces by DC.   5. Pt will improve intrinsic ankle strength to tolerate 20 repeated step ups and downs in order to improve ankle mechanics when negotiating stairs and when ambulating.     Progress strengthening /stabilization /functional activity            Timed:         Manual Therapy:  10       mins  18236;     Therapeutic Exercise:    20     mins  17182;     Neuromuscular Mir:        mins  96956;    Therapeutic Activity:     10     mins  17793;     Gait Training:          mins  15862;      Ultrasound:          mins  53355;    Ionto                                   mins   05550  Self Care                            mins   57466  Aquatic                               mins 04227    Un-Timed:  Canalith Repos                   mins  44919  Electrical Stimulation:         mins  86125 ( );  Dry Needling          mins self-pay  Traction          mins 04355  Low Eval         Mins  96634  Mod Eval          Mins  28741  High Eval                            Mins  02145  Re-Eval                               mins  31255    Timed Treatment:   40   mins   Total Treatment:    40    mins    Emanuel Nguyen PT PT, DPT, [place] License #: [id number]

## 2025-03-17 ENCOUNTER — TREATMENT (OUTPATIENT)
Dept: PHYSICAL THERAPY | Facility: CLINIC | Age: 60
End: 2025-03-17
Payer: COMMERCIAL

## 2025-03-17 DIAGNOSIS — M76.61 ACHILLES TENDINITIS OF RIGHT LOWER EXTREMITY: Primary | ICD-10-CM

## 2025-03-17 NOTE — PROGRESS NOTES
Physical Therapy Daily Note      Gena Harper was seen by Emanuel Nguyen, PT at [facility] PHYSICAL THERAPY  [address]  [place]   Fax [phone]  Phone [phone]       Diagnosis Plan   1. Achilles tendinitis of right lower extremity            VISIT#: 5  Referring practitioner: GENESIS Jasso DPM    Subjective   Gena Harper reports: continued to do well without pain.      .  Objective     See Exercise, Manual, and Modality Logs for complete treatment.     Patient Education:      Assessment/Plan  - continued standing eccentric exercise:advanced reps to remodel tissue.- no pain.  - discussed possibility of discharging if she continues to do well on next visit.  - ASTYM for remodeling soft tissue.  - started therapy early.  - good response to interventions.  - exercise to facilitate functional activities: walking; stairs; etc.    Goals  Plan Goals: ST. Pt will be independent and compliant with initial HEP in 2 weeks. MET  2. Pt will report a 15% improvement in symptoms since starting therapy in 4 weeks. MET  4. Pt will report pain level at worst <3 during walking/standing activity in 2 weeks. MET     LT. Pt will be independent with final HEP for self-management of condition by DC.  2. Pt will improve score on FAAM to greater than 80/84 by DC.   3. Pt will report a 75% improvement in symptoms by DC in order to allow return to PLOF.  4. Pt will improve single leg stance balance to 30 seconds on R LE to indicate improved ankle stability and be able to walk on uneven surfaces by DC.   5. Pt will improve intrinsic ankle strength to tolerate 20 repeated step ups and downs in order to improve ankle mechanics when negotiating stairs and when ambulating.     Progress strengthening /stabilization /functional activity            Timed:         Manual Therapy:  10       mins  21011;     Therapeutic Exercise:    20     mins  52353;     Neuromuscular Mir:         mins  23928;    Therapeutic Activity:     10     mins  38176;     Gait Training:          mins  86048;     Ultrasound:          mins  95657;    Ionto                                   mins   16259  Self Care                            mins   09008  Aquatic                               mins 51597    Un-Timed:  Canalith Repos                   mins  95265  Electrical Stimulation:         mins  16175 ( );  Dry Needling          mins self-pay  Traction          mins 71951  Low Eval         Mins  78144  Mod Eval          Mins  75091  High Eval                            Mins  25094  Re-Eval                               mins  92012    Timed Treatment:   40   mins   Total Treatment:    40    mins    Emanuel Nguyen PT PT, DPT, [place] License #: [id number]

## 2025-03-19 ENCOUNTER — TREATMENT (OUTPATIENT)
Dept: PHYSICAL THERAPY | Facility: CLINIC | Age: 60
End: 2025-03-19
Payer: COMMERCIAL

## 2025-03-19 DIAGNOSIS — M76.61 ACHILLES TENDINITIS OF RIGHT LOWER EXTREMITY: Primary | ICD-10-CM

## 2025-03-19 NOTE — LETTER
Physical Therapy Progress/Discharge Note      Gena Harper was seen by Emanuel Nguyen, PT at [facility] PHYSICAL THERAPY  [address]  [place]   Fax [phone]  Phone [phone]       Diagnosis Plan   1. Achilles tendinitis of right lower extremity            VISIT#: 6  Referring practitioner: GENESIS Jasso DPM    Subjective   Gena Harper reports: no pain with walking or functional activities; voiced readiness for discharge  .  Objective     AROM: WNL's R achilles  Palpation: very mild tenderness at insertion - no pain with functional activities or exercise  Strength: 5/5 R ankle    Step ups 20 reps on step painfree    See Exercise, Manual, and Modality Logs for complete treatment.     Patient Education:  Reviewed HEP and time frame for remodeling of tissue.    Assessment/Plan  - reviewed HEP  - appears ready for discharge: pain free with functional activities and no tenderness to palpation; Goals met.  - advised to continue HEP to facilitate remodeling of tissue; f/u with MD as needed.    Goals  Plan Goals: ST. Pt will be independent and compliant with initial HEP in 2 weeks. MET  2. Pt will report a 15% improvement in symptoms since starting therapy in 4 weeks. MET  4. Pt will report pain level at worst <3 during walking/standing activity in 2 weeks. MET     LT. Pt will be independent with final HEP for self-management of condition by DC. MET  2. Pt will improve score on FAAM to greater than 80/84 by DC.   3. Pt will report a 75% improvement in symptoms by DC in order to allow return to PLOF. MET  4. Pt will improve single leg stance balance to 30 seconds on R LE to indicate improved ankle stability and be able to walk on uneven surfaces by DC.  MET  5. Pt will improve intrinsic ankle strength to tolerate 20 repeated step ups and downs in order to improve ankle mechanics when negotiating stairs and when ambulating. MET    Other -  discharge            Timed:         Manual Therapy:     5    mins  55712;     Therapeutic Exercise:    22     mins  02640;     Neuromuscular Mir:        mins  52024;    Therapeutic Activity:     12     mins  80383;     Gait Training:          mins  42641;     Ultrasound:          mins  16969;    Ionto                                   mins   12952  Self Care                            mins   04627  Aquatic                               mins 14764    Un-Timed:  Canalith Repos                   mins  26886  Electrical Stimulation:         mins  12629 ( );  Dry Needling          mins self-pay  Traction          mins 15658  Low Eval         Mins  69534  Mod Eval          Mins  13284  High Eval                            Mins  93963  Re-Eval                               mins  16896    Timed Treatment:  39   mins   Total Treatment:    39    mins    Emanuel Nguyen PT PT, DPT, [place] License #: [id number]

## 2025-03-19 NOTE — PROGRESS NOTES
Physical Therapy Progress/Discharge Note      Gena Harper was seen by Emanuel Nguyen, PT at [facility] PHYSICAL THERAPY  [address]  [place]   Fax [phone]  Phone [phone]       Diagnosis Plan   1. Achilles tendinitis of right lower extremity            VISIT#: 6  Referring practitioner: GENESIS Jasso DPM    Subjective   Gena Harper reports: no pain with walking or functional activities; voiced readiness for discharge  .  Objective     AROM: WNL's R achilles  Palpation: very mild tenderness at insertion - no pain with functional activities or exercise  Strength: 5/5 R ankle    Step ups 20 reps on step painfree    See Exercise, Manual, and Modality Logs for complete treatment.     Patient Education:  Reviewed HEP and time frame for remodeling of tissue.    Assessment/Plan  - reviewed HEP  - appears ready for discharge: pain free with functional activities and no tenderness to palpation; Goals met.  - advised to continue HEP to facilitate remodeling of tissue; f/u with MD as needed.    Goals  Plan Goals: ST. Pt will be independent and compliant with initial HEP in 2 weeks. MET  2. Pt will report a 15% improvement in symptoms since starting therapy in 4 weeks. MET  4. Pt will report pain level at worst <3 during walking/standing activity in 2 weeks. MET     LT. Pt will be independent with final HEP for self-management of condition by DC. MET  2. Pt will improve score on FAAM to greater than 80/84 by DC.   3. Pt will report a 75% improvement in symptoms by DC in order to allow return to PLOF. MET  4. Pt will improve single leg stance balance to 30 seconds on R LE to indicate improved ankle stability and be able to walk on uneven surfaces by DC.  MET  5. Pt will improve intrinsic ankle strength to tolerate 20 repeated step ups and downs in order to improve ankle mechanics when negotiating stairs and when ambulating. MET    Other -  discharge            Timed:         Manual Therapy:     5    mins  45531;     Therapeutic Exercise:    22     mins  59027;     Neuromuscular Mir:        mins  49435;    Therapeutic Activity:     12     mins  45861;     Gait Training:          mins  87366;     Ultrasound:          mins  39106;    Ionto                                   mins   37700  Self Care                            mins   44579  Aquatic                               mins 30542    Un-Timed:  Canalith Repos                   mins  48430  Electrical Stimulation:         mins  69572 ( );  Dry Needling          mins self-pay  Traction          mins 24990  Low Eval         Mins  91641  Mod Eval          Mins  91738  High Eval                            Mins  75770  Re-Eval                               mins  26349    Timed Treatment:  39   mins   Total Treatment:    39    mins    Emanuel Nguyen PT PT, DPT, [place] License #: [id number]

## 2025-04-01 ENCOUNTER — OFFICE VISIT (OUTPATIENT)
Age: 60
End: 2025-04-01
Payer: COMMERCIAL

## 2025-04-01 VITALS — HEIGHT: 64 IN | WEIGHT: 237 LBS | BODY MASS INDEX: 40.46 KG/M2 | RESPIRATION RATE: 20 BRPM

## 2025-04-01 DIAGNOSIS — M19.072 ARTHRITIS OF LEFT FOOT: ICD-10-CM

## 2025-04-01 DIAGNOSIS — M77.51 RETROCALCANEAL BURSITIS, RIGHT: ICD-10-CM

## 2025-04-01 DIAGNOSIS — M77.31 CALCANEAL SPUR, RIGHT: ICD-10-CM

## 2025-04-01 DIAGNOSIS — M79.671 RIGHT FOOT PAIN: Primary | ICD-10-CM

## 2025-04-01 DIAGNOSIS — E66.01 MORBID OBESITY WITH BMI OF 40.0-44.9, ADULT: ICD-10-CM

## 2025-04-01 DIAGNOSIS — M76.61 ACHILLES TENDINITIS OF RIGHT LOWER EXTREMITY: ICD-10-CM

## 2025-04-01 DIAGNOSIS — M21.861 ACQUIRED POSTERIOR EQUINUS OF RIGHT LOWER EXTREMITY: ICD-10-CM

## 2025-04-02 NOTE — PROGRESS NOTES
04/01/2025  Foot and Ankle Surgery - Established Patient/Follow-up  Provider: Dr. Jeremiah Jasso DPM  Location: [facility]    Subjective:  Gena Harper is a 59 y.o. female.     Chief Complaint   Patient presents with    Right Foot - Pain, Follow-up    Follow-up     PCP: Day Cardoso APRN  Last PCP Visit:          History of Present Illness  The patient presents for evaluation of bilateral foot pain.    She reports a significant improvement in her heel condition, estimating a 90% reduction in symptoms since her last visit. She has been adhering to a stretching regimen and maintains her usual level of activity. She expresses a preference to continue with the current treatment plan and return for a follow-up only if necessary. She received an injection therapy to her heel 6 weeks ago.    Additionally, she mentions a persistent issue with her left foot, characterized by a noticeable bump. This bump occasionally causes discomfort, particularly when it comes into contact with certain types of footwear.      No Known Allergies    Current Outpatient Medications on File Prior to Visit   Medication Sig Dispense Refill    ascorbic acid (VITAMIN C) 1000 MG tablet       atorvastatin (LIPITOR) 20 MG tablet Take 1 tablet by mouth Daily. 90 tablet 1    Calcium Carbonate (CALTRATE 600 PO) Take 1 tablet by mouth Daily.      ferrous sulfate 325 (65 FE) MG tablet Take 1 tablet by mouth Daily With Breakfast.      Flaxseed, Linseed, (Flaxseed Oil) 1400 MG capsule       Garlic 100 MG tablet Take 100 mg by mouth Daily.      glucosamine-chondroitin 500-400 MG capsule capsule Take  by mouth 3 (Three) Times a Day With Meals.      ibuprofen (ADVIL,MOTRIN) 200 MG tablet Take 1 tablet by mouth Every 6 (Six) Hours As Needed for Mild Pain.      lisinopril (PRINIVIL,ZESTRIL) 20 MG tablet Take 1 tablet by mouth Daily. 90 tablet 1    loratadine (Claritin) 10 MG tablet Take 1 tablet by mouth Daily.      Multiple Vitamins-Minerals  "(ZINC PO) Take  by mouth Daily.      Omega-3 Fatty Acids (fish oil) 1000 MG capsule capsule Take 1 capsule by mouth 2 (Two) Times a Day With Meals.      PARoxetine (PAXIL) 10 MG tablet Take 1 tablet by mouth Daily. 90 tablet 1    Psyllium (METAMUCIL 4 IN 1 FIBER PO) As Needed (constipation).      Rhubarb (ESTROVEN COMPLETE PO) Take  by mouth Daily.      Rybelsus 7 MG tablet TAKE 1 TABLET BY MOUTH EVERY MORNING BEFORE BREAKFAST 90 tablet 1    vitamin B-12 (CYANOCOBALAMIN) 1000 MCG tablet Take 1 tablet by mouth Daily.      Vitamin D, Cholecalciferol, (CHOLECALCIFEROL) 10 MCG (400 UNIT) tablet Take 1 tablet by mouth Daily.       No current facility-administered medications on file prior to visit.       Objective   Resp 20   Ht 162.6 cm (64\")   Wt 108 kg (237 lb)   BMI 40.68 kg/m²     Foot/Ankle Exam  Physical Exam  GENERAL  Appearance:  appears stated age and obese  Orientation:  AAOx3  Affect:  appropriate     VASCULAR      Right Foot Vascularity   Normal vascular exam    Dorsalis pedis:  2+  Posterior tibial:  2+  Skin temperature:  warm  Edema grading:  None  CFT:  < 3 seconds  Pedal hair growth:  Present  Varicosities:  none     NEUROLOGIC      Right Foot Neurologic   Light touch sensation: normal  Hot/Cold sensation: normal  Achilles reflex:  2+     MUSCULOSKELETAL      Right Foot Musculoskeletal   Ecchymosis:  none  Tenderness:  achilles tendon tenderness and retrocalcaneal bursa tenderness    Arch:  Normal     MUSCLE STRENGTH      Right Foot Muscle Strength   Normal strength    Foot dorsiflexion:  5  Foot plantar flexion:  5  Foot inversion:  5  Foot eversion:  5     DERMATOLOGIC       Right Foot Dermatologic   Skin  Right foot skin is intact.      TESTS      Right Foot Tests   Anterior drawer: negative  Varus tilt: negative     Right foot additional comments: Significant equinus contracture with knee extended and flexed.  Prominent discomfort with palpation involving the posterior aspect of the heel.  No " obvious deformity or instability involving the lower extremity.    4/1/25: Physical exam is relatively unchanged but continued improvement involving the posterior aspect of the right heel.  Continued equinus contracture.  No deficit appreciated to the Achilles tendon.  Bony prominence and discomfort involving the dorsal midfoot of the left lower extremity.      Results      Assessment & Plan   Diagnoses and all orders for this visit:    1. Right foot pain (Primary)    2. Achilles tendinitis of right lower extremity    3. Calcaneal spur, right    4. Acquired posterior equinus of right lower extremity    5. Retrocalcaneal bursitis, right    6. Morbid obesity with BMI of 40.0-44.9, adult    7. Arthritis of left foot      Assessment & Plan    She reports a 90% improvement in symptoms following the previous injection therapy. The tendon remains intact upon examination. She is advised to continue with stretching exercises and low-impact activities to maintain balance and prevent overuse injuries. The use of tennis shoe inserts is recommended for daily wear. If discomfort arises, wedge shoes may be considered as an alternative. Weight management is also emphasized as a crucial factor in preventing further complications. If symptoms worsen, another flare-up will be managed accordingly.    The bump on the left foot is likely due to arthritis. An x-ray of both feet will be conducted prior to the next appointment to confirm the diagnosis and assess the severity. If the x-ray shows significant issues, further treatment options will be discussed.Reviewed proper basic stretching and manual therapy exercises along with appropriate shoes and activity.  Discussed proper use and/or avoidance of OTC anti-inflammatories.  Patient is to call with any additional issues or concerns.  Greater than 20 minutes was spent before, during, and after evaluation for patient care.    The encounter note is created with the use of AI technology.  I  do apologize if there are typos and/or confusion within the note.  Please feel free to contact me or my office with any questions or concerns.    Follow-up  The patient will follow up in 3 months and the imaging to both feet               Patient or patient representative verbalized consent for the use of Ambient Listening during the visit with  GENESIS Jasso DPM for chart documentation. 4/2/2025  07:19 EDT    GENESIS Jasso DPM

## 2025-04-14 ENCOUNTER — TELEPHONE (OUTPATIENT)
Dept: FAMILY MEDICINE CLINIC | Facility: CLINIC | Age: 60
End: 2025-04-14
Payer: COMMERCIAL

## 2025-04-14 RX ORDER — AZITHROMYCIN 250 MG/1
TABLET, FILM COATED ORAL
Qty: 6 TABLET | Refills: 0 | Status: SHIPPED | OUTPATIENT
Start: 2025-04-14

## 2025-04-14 NOTE — TELEPHONE ENCOUNTER
Provider: VANITA HERNANDEZ     Caller: Gena Harper    Relationship to Patient: Self    Pharmacy: [facility] #[id number] [address] [place] [facility] - [phone]  - [phone]  [phone]    Phone Number:[phone]            Reason for Call: PATIENT STATES SHE HAS NASAL CONGESTION, SORE THROAT AND HAS BEEN TAKING MUSINEX DM , AN ANTIHISTAMINE, FLONASE, AND COUGH DROPS.  SHE STATES SHE HAS HAD FACIAL AND TOOTH PAIN, PRESSURE WITH EARS POPPING.  SHE IS WANTING TO KNOW IF SHE SHOULD CONTINUE WITH THE CURRENT MEDICATIONS OR WHAT DOES MS HERNANDEZ RECOMMEND.    PLEASE ADVISE.

## 2025-04-22 NOTE — ADDENDUM NOTE
Addended by: VANITA HERNANDEZ on: 2/6/2023 12:12 AM     Modules accepted: Level of Service     Number Of Freeze-Thaw Cycles: 2 freeze-thaw cycles Post-Care Instructions: I reviewed with the patient in detail post-care instructions. Patient is to wear sunprotection, and avoid picking at any of the treated lesions. Pt may apply Vaseline to crusted or scabbing areas. Duration Of Freeze Thaw-Cycle (Seconds): 2 Render Post-Care Instructions In Note?: yes Consent: The patient's consent was obtained including but not limited to risks of crusting, scabbing, blistering, scarring, darker or lighter pigmentary change, recurrence, incomplete removal and infection. Detail Level: Detailed Render Note In Bullet Format When Appropriate: No

## 2025-04-29 ENCOUNTER — TELEPHONE (OUTPATIENT)
Dept: FAMILY MEDICINE CLINIC | Facility: CLINIC | Age: 60
End: 2025-04-29
Payer: COMMERCIAL

## 2025-04-29 NOTE — TELEPHONE ENCOUNTER
Pt. Called started with vertigo late yesterday. spinning, she is going out of town thurs. Friday, please call.  [phone]

## 2025-06-23 NOTE — PROGRESS NOTES
Transitional Care Follow Up Visit  Subjective     Gena Harper is a 58 y.o. female who presents for a transitional care management visit.    Within 48 business hours after discharge our office contacted her via telephone to coordinate her care and needs.      I reviewed and discussed the details of that call along with the discharge summary, hospital problems, inpatient lab results, inpatient diagnostic studies, and consultation reports with Gena.     Current outpatient and discharge medications have been reconciled for the patient.  Reviewed by: TO Pickens          2/17/2024     7:46 PM   Date of TCM Phone Call   [facility]   Date of Admission 2/16/2024   Date of Discharge 2/17/2024   Discharge Disposition Home or Self Care     Risk for Readmission (LACE) Score: 4 (2/17/2024  6:00 AM)      History of Present Illness   Course During Hospital Stay: There was ER to hospital admission 2/16-2/17/24. Dx perianal abscess, intramural leiomyoma, and pulmonary nodule.  Patient underwent   I & D of perianal abscess. Discharged on Augmentin and pain med.  To follow-up with surgery in 4 weeks.  Reported affected area is healing well.  Incidental finding of 1.8 cm nodule within right lower lobe small focal calcification; recommended follow-up chest CT in 3 months to monitor.  Also has enlarged uterus suspicious for fibroid; patient verbalizes concern.  Last menstrual period approximately 4 to 5 years ago.  Negative for any nausea, vomiting, abdominal or pelvic pain.     The following portions of the patient's history were reviewed and updated as appropriate: allergies, current medications, past family history, past medical history, past social history, past surgical history, and problem list.    Review of Systems   Constitutional:  Negative for appetite change, chills, diaphoresis, fatigue and fever.   HENT:  Negative for congestion, ear pain, postnasal drip, sinus pressure, sneezing and  sore throat.    Eyes:  Negative for visual disturbance.   Respiratory:  Negative for cough, shortness of breath and wheezing.    Cardiovascular:  Negative for chest pain and palpitations.   Gastrointestinal:  Negative for abdominal distention, abdominal pain, anal bleeding, constipation, diarrhea, nausea and vomiting.        Rectal discomfort significantly improved.   Genitourinary:  Negative for dysuria, frequency, pelvic pain and vaginal bleeding.   Musculoskeletal:  Negative for arthralgias and myalgias.   Skin:  Negative for rash.   Neurological:  Negative for dizziness, weakness, numbness and headaches.   Psychiatric/Behavioral:  Negative for sleep disturbance.        Objective   Physical Exam  Constitutional:       General: She is not in acute distress.     Appearance: She is well-groomed. She is obese.      Comments: Pleasant, converses appropriately    HENT:      Head: Normocephalic.      Right Ear: External ear normal.      Left Ear: External ear normal.      Nose: Nose normal.      Mouth/Throat:      Lips: Pink.      Mouth: Mucous membranes are moist.      Pharynx: Oropharynx is clear.   Eyes:      Conjunctiva/sclera: Conjunctivae normal.   Cardiovascular:      Rate and Rhythm: Normal rate and regular rhythm.      Chest Wall: PMI is not displaced.      Pulses: Normal pulses.      Heart sounds: Normal heart sounds, S1 normal and S2 normal.   Pulmonary:      Effort: Pulmonary effort is normal.      Breath sounds: Normal breath sounds.      Comments: Negative cough  Abdominal:      General: Bowel sounds are normal.      Palpations: Abdomen is soft.      Tenderness: There is no abdominal tenderness.      Comments: Perianal area visualized, negative for erythema, induration and drainage.  There is small dressing applied laterally to the right of anus which is clean dry and intact.   Musculoskeletal:         General: Normal range of motion.      Cervical back: Neck supple.      Right lower leg: No edema.       Left lower leg: No edema.   Skin:     General: Skin is warm and dry.   Neurological:      Mental Status: She is alert and oriented to person, place, and time.      Gait: Gait is intact.   Psychiatric:         Mood and Affect: Mood normal.         Thought Content: Thought content normal.         Assessment & Plan   Problems Addressed this Visit       Pulmonary nodule    Relevant Orders    CT Chest Without Contrast    Enlarged uterus    Relevant Orders    Ambulatory Referral to Gynecology     Other Visit Diagnoses       Hospital discharge follow-up    -  Primary    Relevant Orders    Comprehensive Metabolic Panel    CBC & Differential          Diagnoses         Codes Comments    Hospital discharge follow-up    -  Primary ICD-10-CM: Z09  ICD-9-CM: V67.59     Pulmonary nodule     ICD-10-CM: R91.1  ICD-9-CM: 793.11     Enlarged uterus     ICD-10-CM: N85.2  ICD-9-CM: 621.2

## 2025-07-01 ENCOUNTER — OFFICE VISIT (OUTPATIENT)
Age: 60
End: 2025-07-01
Payer: COMMERCIAL

## 2025-07-01 VITALS — HEART RATE: 71 BPM | BODY MASS INDEX: 40.12 KG/M2 | WEIGHT: 235 LBS | HEIGHT: 64 IN | OXYGEN SATURATION: 96 %

## 2025-07-01 DIAGNOSIS — M20.21 HALLUX RIGIDUS, RIGHT FOOT: ICD-10-CM

## 2025-07-01 DIAGNOSIS — M77.31 CALCANEAL SPUR, RIGHT: ICD-10-CM

## 2025-07-01 DIAGNOSIS — M79.671 RIGHT FOOT PAIN: ICD-10-CM

## 2025-07-01 DIAGNOSIS — M79.672 BILATERAL FOOT PAIN: Primary | ICD-10-CM

## 2025-07-01 DIAGNOSIS — M76.61 ACHILLES TENDINITIS OF RIGHT LOWER EXTREMITY: ICD-10-CM

## 2025-07-01 DIAGNOSIS — M77.51 RETROCALCANEAL BURSITIS, RIGHT: ICD-10-CM

## 2025-07-01 DIAGNOSIS — E66.01 MORBID OBESITY WITH BMI OF 40.0-44.9, ADULT: ICD-10-CM

## 2025-07-01 DIAGNOSIS — M79.671 BILATERAL FOOT PAIN: Primary | ICD-10-CM

## 2025-07-01 DIAGNOSIS — M21.861 ACQUIRED POSTERIOR EQUINUS OF RIGHT LOWER EXTREMITY: ICD-10-CM

## 2025-07-01 DIAGNOSIS — M19.072 ARTHRITIS OF MIDTARSAL JOINT OF LEFT FOOT: ICD-10-CM

## 2025-07-01 RX ORDER — TRIAMCINOLONE ACETONIDE 40 MG/ML
20 INJECTION, SUSPENSION INTRA-ARTICULAR; INTRAMUSCULAR ONCE
Status: COMPLETED | OUTPATIENT
Start: 2025-07-01 | End: 2025-07-01

## 2025-07-01 RX ADMIN — TRIAMCINOLONE ACETONIDE 20 MG: 40 INJECTION, SUSPENSION INTRA-ARTICULAR; INTRAMUSCULAR at 09:06

## 2025-07-02 NOTE — PROGRESS NOTES
07/01/2025  Foot and Ankle Surgery - Established Patient/Follow-up  Provider: Dr. Jeremiah Jasso DPM  Location: [facility]    Subjective:  Gena Harper is a 59 y.o. female.     Chief Complaint   Patient presents with    Right Foot - Follow-up, Pain    Left Foot - Initial Evaluation     Has a bump on the top of her foot    Follow-Up     WalkerDay CORRINA, APRN  2/3/25       History of Present Illness  The patient presents for evaluation of right foot pain.    She reports a recurrence of pain in her right foot, similar to the discomfort she experienced during her last visit. Her left foot is generally asymptomatic, with the exception of occasional pain from a bump on the top. She has not made any significant changes to her activity level or work routine, which involves prolonged periods of sitting at a desk. She admits to inconsistent performance of stretching exercises. Her footwear typically consists of tennis shoes, although she occasionally takes breaks from wearing them due to irritation. She also experiences intermittent pain in the back of her leg. She expresses a preference for conservative treatment options over surgical intervention at this time.      No Known Allergies    Current Outpatient Medications on File Prior to Visit   Medication Sig Dispense Refill    ascorbic acid (VITAMIN C) 1000 MG tablet       atorvastatin (LIPITOR) 20 MG tablet Take 1 tablet by mouth Daily. 90 tablet 1    Calcium Carbonate (CALTRATE 600 PO) Take 1 tablet by mouth Daily.      ferrous sulfate 325 (65 FE) MG tablet Take 1 tablet by mouth Daily With Breakfast.      Flaxseed, Linseed, (Flaxseed Oil) 1400 MG capsule       Garlic 100 MG tablet Take 100 mg by mouth Daily.      glucosamine-chondroitin 500-400 MG capsule capsule Take  by mouth 3 (Three) Times a Day With Meals.      ibuprofen (ADVIL,MOTRIN) 200 MG tablet Take 1 tablet by mouth Every 6 (Six) Hours As Needed for Mild Pain.      lisinopril (PRINIVIL,ZESTRIL) 20  "MG tablet Take 1 tablet by mouth Daily. 90 tablet 1    loratadine (Claritin) 10 MG tablet Take 1 tablet by mouth Daily.      Multiple Vitamins-Minerals (ZINC PO) Take  by mouth Daily.      Omega-3 Fatty Acids (fish oil) 1000 MG capsule capsule Take 1 capsule by mouth 2 (Two) Times a Day With Meals.      PARoxetine (PAXIL) 10 MG tablet Take 1 tablet by mouth Daily. 90 tablet 1    Psyllium (METAMUCIL 4 IN 1 FIBER PO) As Needed (constipation).      Rhubarb (ESTROVEN COMPLETE PO) Take  by mouth Daily.      Rybelsus 7 MG tablet TAKE 1 TABLET BY MOUTH EVERY MORNING BEFORE BREAKFAST 90 tablet 1    vitamin B-12 (CYANOCOBALAMIN) 1000 MCG tablet Take 1 tablet by mouth Daily.      Vitamin D, Cholecalciferol, (CHOLECALCIFEROL) 10 MCG (400 UNIT) tablet Take 1 tablet by mouth Daily.       No current facility-administered medications on file prior to visit.       Objective   Pulse 71   Ht 162.6 cm (64\")   Wt 107 kg (235 lb)   SpO2 96%   BMI 40.34 kg/m²     Foot/Ankle Exam  Physical Exam  GENERAL  Appearance:  appears stated age and obese  Orientation:  AAOx3  Affect:  appropriate     VASCULAR      Right Foot Vascularity   Normal vascular exam    Dorsalis pedis:  2+  Posterior tibial:  2+  Skin temperature:  warm  Edema grading:  None  CFT:  < 3 seconds  Pedal hair growth:  Present  Varicosities:  none     NEUROLOGIC      Right Foot Neurologic   Light touch sensation: normal  Hot/Cold sensation: normal  Achilles reflex:  2+     MUSCULOSKELETAL      Right Foot Musculoskeletal   Ecchymosis:  none  Tenderness:  achilles tendon tenderness and retrocalcaneal bursa tenderness    Arch:  Normal     MUSCLE STRENGTH      Right Foot Muscle Strength   Normal strength    Foot dorsiflexion:  5  Foot plantar flexion:  5  Foot inversion:  5  Foot eversion:  5     DERMATOLOGIC       Right Foot Dermatologic   Skin  Right foot skin is intact.      TESTS      Right Foot Tests   Anterior drawer: negative  Varus tilt: negative     Right foot " additional comments: Significant equinus contracture with knee extended and flexed.  Prominent discomfort with palpation involving the posterior aspect of the heel.  No obvious deformity or instability involving the lower extremity.     4/1/25: Physical exam is relatively unchanged but continued improvement involving the posterior aspect of the right heel.  Continued equinus contracture.  No deficit appreciated to the Achilles tendon.  Bony prominence and discomfort involving the dorsal midfoot of the left lower extremity.    7/1/25: Moderate discomfort with palpation involving the posterior superior aspect of the right heel.  Mild inflammation present.  Achilles tendon remains grossly intact with equinus contracture.  Bony prominence noted to the dorsal aspect of the first metatarsal phalangeal joint on the right foot.  Findings consistent with hallux rigidus.  Similar finding involving the dorsal aspect of the left second tarsometatarsal joint region which is unchanged as compared to previous assessment.      Results  Imaging  X-ray shows arthritis at the level of the big toe joint on the right foot and a spur on the left foot. The Achilles tendon thickness is normal on the left foot but increased on the right foot.    Assessment & Plan   Diagnoses and all orders for this visit:    1. Bilateral foot pain (Primary)  -     XR Foot 3+ View Bilateral  -     triamcinolone acetonide (KENALOG-40) injection 20 mg    2. Right foot pain    3. Achilles tendinitis of right lower extremity    4. Calcaneal spur, right    5. Retrocalcaneal bursitis, right    6. Acquired posterior equinus of right lower extremity    7. Morbid obesity with BMI of 40.0-44.9, adult    8. Arthritis of midtarsal joint of left foot    9. Hallux rigidus, right foot      Assessment & Plan    Patient continues to have pain and limitation involving the right foot.  A majority of her pain is located to the posterior superior aspect of the calcaneus.   Findings are consistent with retrocalcaneal bursitis, Achilles tendinitis, and equinus contracture.  She also complains of intermittent discomfort involving the first MTPJ region which does appear to be consistent with hallux rigidus based on the imaging.  The bony prominence involving the dorsal aspect of the left midfoot is consistent with arthritis and stable as compared to previous assessment.  We did discuss her diagnoses treatment options at length today.  Patient understands that she does have structural and functional issues that are likely causing her symptoms.  We did discuss potential surgical options regarding the right foot which would require gastrocnemius recession, Damien's removal, and possibly first metatarsophalangeal joint arthrodesis.  I did review the procedures, risks, goals, and recovery.  She understands that she would require off weightbearing after the procedures and decreased overall activity.  Patient does not want to proceed with any type of surgery at this time.  She states that she would like to proceed with symptom management and repeat steroid injection.  I close injection was given without complication.  She states that her left foot has remained relatively stable.  I would like her to continue wearing the power step inserts and I have asked that she proceed with stretching and manual therapy exercises.  Patient has opted to see me in 3 months for reevaluation and further planning.  Greater than 30 minutes was spent before, during, and after evaluation for patient care.     Retrocalcaneal/ Achilles Steroid Injection: Right    Consent and time out was performed before proceeding with injection.  The skin about the lateral Kagar's triangle region of the right foot was cleansed with alcohol.  Ultrasound guidance was used to evaluate then Achilles tendon and injection guidance. Using an aseptic technique, a 1.5 mL solution containing 0.5 mL of 0.5% Marcaine plain, 0.5mL of 1%  lidocaine plain and 0.5 mL of Kenalog was injected to the insertion site of the Achilles tendon. After the injection, compression was applied followed by a sterile bandage.  The patient noted relief from pain and tolerated the injection well without complication.    Patient is to follow-up in 3 months           Patient or patient representative verbalized consent for the use of Ambient Listening during the visit with  GENESIS Jasso DPM for chart documentation. 7/2/2025  07:05 EDT    GENESIS Jasso DPM

## 2025-07-06 DIAGNOSIS — E11.65 TYPE 2 DIABETES MELLITUS WITH HYPERGLYCEMIA, WITHOUT LONG-TERM CURRENT USE OF INSULIN: ICD-10-CM

## 2025-07-07 RX ORDER — ORAL SEMAGLUTIDE 7 MG/1
1 TABLET ORAL
Qty: 90 TABLET | Refills: 1 | Status: SHIPPED | OUTPATIENT
Start: 2025-07-07

## 2025-07-28 ENCOUNTER — LAB (OUTPATIENT)
Dept: FAMILY MEDICINE CLINIC | Facility: CLINIC | Age: 60
End: 2025-07-28
Payer: COMMERCIAL

## 2025-07-28 DIAGNOSIS — Z86.2 HISTORY OF ANEMIA: ICD-10-CM

## 2025-07-28 DIAGNOSIS — E11.65 TYPE 2 DIABETES MELLITUS WITH HYPERGLYCEMIA, WITHOUT LONG-TERM CURRENT USE OF INSULIN: Primary | ICD-10-CM

## 2025-07-28 LAB
DEPRECATED RDW RBC AUTO: 42.6 FL (ref 37–54)
ERYTHROCYTE [DISTWIDTH] IN BLOOD BY AUTOMATED COUNT: 14.1 % (ref 12.3–15.4)
HCT VFR BLD AUTO: 39.9 % (ref 34–46.6)
HGB BLD-MCNC: 12.9 G/DL (ref 12–15.9)
MCH RBC QN AUTO: 27.2 PG (ref 26.6–33)
MCHC RBC AUTO-ENTMCNC: 32.3 G/DL (ref 31.5–35.7)
MCV RBC AUTO: 84.2 FL (ref 79–97)
PLATELET # BLD AUTO: 237 10*3/MM3 (ref 140–450)
PMV BLD AUTO: 10.3 FL (ref 6–12)
RBC # BLD AUTO: 4.74 10*6/MM3 (ref 3.77–5.28)
WBC NRBC COR # BLD AUTO: 7.98 10*3/MM3 (ref 3.4–10.8)

## 2025-07-28 PROCEDURE — 82043 UR ALBUMIN QUANTITATIVE: CPT | Performed by: NURSE PRACTITIONER

## 2025-07-28 PROCEDURE — 36415 COLL VENOUS BLD VENIPUNCTURE: CPT

## 2025-07-28 PROCEDURE — 80050 GENERAL HEALTH PANEL: CPT | Performed by: NURSE PRACTITIONER

## 2025-07-28 PROCEDURE — 82607 VITAMIN B-12: CPT | Performed by: NURSE PRACTITIONER

## 2025-07-28 PROCEDURE — 82306 VITAMIN D 25 HYDROXY: CPT | Performed by: NURSE PRACTITIONER

## 2025-07-28 PROCEDURE — 83036 HEMOGLOBIN GLYCOSYLATED A1C: CPT | Performed by: NURSE PRACTITIONER

## 2025-07-28 PROCEDURE — 80061 LIPID PANEL: CPT | Performed by: NURSE PRACTITIONER

## 2025-07-28 PROCEDURE — 82570 ASSAY OF URINE CREATININE: CPT | Performed by: NURSE PRACTITIONER

## 2025-07-29 LAB
25(OH)D3 SERPL-MCNC: 41.1 NG/ML (ref 30–100)
ALBUMIN SERPL-MCNC: 4.1 G/DL (ref 3.5–5.2)
ALBUMIN UR-MCNC: 2.1 MG/DL
ALBUMIN/GLOB SERPL: 1.4 G/DL
ALP SERPL-CCNC: 100 U/L (ref 39–117)
ALT SERPL W P-5'-P-CCNC: 17 U/L (ref 1–33)
ANION GAP SERPL CALCULATED.3IONS-SCNC: 10.7 MMOL/L (ref 5–15)
AST SERPL-CCNC: 12 U/L (ref 1–32)
BILIRUB SERPL-MCNC: 0.3 MG/DL (ref 0–1.2)
BUN SERPL-MCNC: 16 MG/DL (ref 6–20)
BUN/CREAT SERPL: 19.8 (ref 7–25)
CALCIUM SPEC-SCNC: 9.2 MG/DL (ref 8.6–10.5)
CHLORIDE SERPL-SCNC: 102 MMOL/L (ref 98–107)
CHOLEST SERPL-MCNC: 217 MG/DL (ref 0–200)
CO2 SERPL-SCNC: 26.3 MMOL/L (ref 22–29)
CREAT SERPL-MCNC: 0.81 MG/DL (ref 0.57–1)
CREAT UR-MCNC: 186.5 MG/DL
EGFRCR SERPLBLD CKD-EPI 2021: 84.3 ML/MIN/1.73
GLOBULIN UR ELPH-MCNC: 2.9 GM/DL
GLUCOSE SERPL-MCNC: 114 MG/DL (ref 65–99)
HBA1C MFR BLD: 6.4 % (ref 4.8–5.6)
HDLC SERPL-MCNC: 59 MG/DL (ref 40–60)
LDLC SERPL CALC-MCNC: 123 MG/DL (ref 0–100)
LDLC/HDLC SERPL: 2 {RATIO}
MICROALBUMIN/CREAT UR: 11.3 MG/G (ref 0–29)
POTASSIUM SERPL-SCNC: 4.4 MMOL/L (ref 3.5–5.2)
PROT SERPL-MCNC: 7 G/DL (ref 6–8.5)
SODIUM SERPL-SCNC: 139 MMOL/L (ref 136–145)
TRIGL SERPL-MCNC: 200 MG/DL (ref 0–150)
TSH SERPL DL<=0.05 MIU/L-ACNC: 1.68 UIU/ML (ref 0.27–4.2)
VIT B12 BLD-MCNC: 839 PG/ML (ref 211–946)
VLDLC SERPL-MCNC: 35 MG/DL (ref 5–40)

## 2025-08-04 ENCOUNTER — OFFICE VISIT (OUTPATIENT)
Dept: FAMILY MEDICINE CLINIC | Facility: CLINIC | Age: 60
End: 2025-08-04
Payer: COMMERCIAL

## 2025-08-04 VITALS
HEIGHT: 64 IN | OXYGEN SATURATION: 98 % | HEART RATE: 67 BPM | SYSTOLIC BLOOD PRESSURE: 138 MMHG | DIASTOLIC BLOOD PRESSURE: 86 MMHG | BODY MASS INDEX: 41.21 KG/M2 | WEIGHT: 241.4 LBS

## 2025-08-04 DIAGNOSIS — I10 PRIMARY HYPERTENSION: ICD-10-CM

## 2025-08-04 DIAGNOSIS — R91.1 PULMONARY NODULE: ICD-10-CM

## 2025-08-04 DIAGNOSIS — G89.29 CHRONIC PAIN OF BOTH FEET: ICD-10-CM

## 2025-08-04 DIAGNOSIS — M79.672 CHRONIC PAIN OF BOTH FEET: ICD-10-CM

## 2025-08-04 DIAGNOSIS — R53.83 OTHER FATIGUE: ICD-10-CM

## 2025-08-04 DIAGNOSIS — E55.9 VITAMIN D DEFICIENCY: ICD-10-CM

## 2025-08-04 DIAGNOSIS — E66.813 CLASS 3 SEVERE OBESITY WITHOUT SERIOUS COMORBIDITY WITH BODY MASS INDEX (BMI) OF 40.0 TO 44.9 IN ADULT, UNSPECIFIED OBESITY TYPE: ICD-10-CM

## 2025-08-04 DIAGNOSIS — M79.671 CHRONIC PAIN OF BOTH FEET: ICD-10-CM

## 2025-08-04 DIAGNOSIS — E11.65 TYPE 2 DIABETES MELLITUS WITH HYPERGLYCEMIA, WITHOUT LONG-TERM CURRENT USE OF INSULIN: Primary | ICD-10-CM

## 2025-08-04 DIAGNOSIS — E78.2 MULTIPLE-TYPE HYPERLIPIDEMIA: ICD-10-CM

## 2025-08-04 DIAGNOSIS — F41.9 ANXIETY: ICD-10-CM

## 2025-08-04 PROCEDURE — 99214 OFFICE O/P EST MOD 30 MIN: CPT | Performed by: NURSE PRACTITIONER

## 2025-08-04 RX ORDER — LISINOPRIL 20 MG/1
20 TABLET ORAL DAILY
Qty: 90 TABLET | Refills: 1 | Status: SHIPPED | OUTPATIENT
Start: 2025-08-04

## 2025-08-04 RX ORDER — PAROXETINE 10 MG/1
10 TABLET, FILM COATED ORAL DAILY
Qty: 90 TABLET | Refills: 1 | Status: SHIPPED | OUTPATIENT
Start: 2025-08-04

## 2025-08-04 RX ORDER — ATORVASTATIN CALCIUM 20 MG/1
20 TABLET, FILM COATED ORAL DAILY
Qty: 90 TABLET | Refills: 1 | Status: SHIPPED | OUTPATIENT
Start: 2025-08-04